# Patient Record
Sex: FEMALE | Race: WHITE | NOT HISPANIC OR LATINO | Employment: PART TIME | URBAN - METROPOLITAN AREA
[De-identification: names, ages, dates, MRNs, and addresses within clinical notes are randomized per-mention and may not be internally consistent; named-entity substitution may affect disease eponyms.]

---

## 2018-06-18 ENCOUNTER — HOSPITAL ENCOUNTER (EMERGENCY)
Facility: MEDICAL CENTER | Age: 53
End: 2018-06-24
Attending: EMERGENCY MEDICINE
Payer: MEDICAID

## 2018-06-18 DIAGNOSIS — R45.851 SUICIDAL IDEATION: ICD-10-CM

## 2018-06-18 LAB
AMPHETAMINES UR QL: NEGATIVE
BARBITURATES UR QL SCN: NEGATIVE
BENZODIAZ UR QL SCN: POSITIVE
BZE UR QL SCN: NEGATIVE
HCG UR QL: NEGATIVE
PCP UR QL SCN: NEGATIVE
POC BREATHALIZER: 0 PERCENT (ref 0–0.01)
SP GR UR REFRACTOMETRY: 1.02
UR OPIATES 2659: NEGATIVE
UR THC 2511T: NEGATIVE
UR TRICYCLIC 2660: NEGATIVE

## 2018-06-18 PROCEDURE — 81025 URINE PREGNANCY TEST: CPT

## 2018-06-18 PROCEDURE — 700102 HCHG RX REV CODE 250 W/ 637 OVERRIDE(OP): Performed by: EMERGENCY MEDICINE

## 2018-06-18 PROCEDURE — A9270 NON-COVERED ITEM OR SERVICE: HCPCS | Performed by: EMERGENCY MEDICINE

## 2018-06-18 PROCEDURE — 700102 HCHG RX REV CODE 250 W/ 637 OVERRIDE(OP)

## 2018-06-18 PROCEDURE — A9270 NON-COVERED ITEM OR SERVICE: HCPCS

## 2018-06-18 PROCEDURE — 302970 POC BREATHALIZER: Performed by: EMERGENCY MEDICINE

## 2018-06-18 PROCEDURE — 99285 EMERGENCY DEPT VISIT HI MDM: CPT

## 2018-06-18 PROCEDURE — 80305 DRUG TEST PRSMV DIR OPT OBS: CPT

## 2018-06-18 RX ORDER — BUSPIRONE HYDROCHLORIDE 5 MG/1
5 TABLET ORAL 3 TIMES DAILY PRN
Status: DISCONTINUED | OUTPATIENT
Start: 2018-06-18 | End: 2018-06-24 | Stop reason: HOSPADM

## 2018-06-18 RX ORDER — ATORVASTATIN CALCIUM 10 MG/1
10 TABLET, FILM COATED ORAL DAILY
Status: SHIPPED | COMMUNITY
End: 2018-11-30 | Stop reason: CLARIF

## 2018-06-18 RX ORDER — DULOXETIN HYDROCHLORIDE 30 MG/1
60 CAPSULE, DELAYED RELEASE ORAL DAILY
Status: SHIPPED | COMMUNITY
End: 2018-11-30 | Stop reason: CLARIF

## 2018-06-18 RX ORDER — MELOXICAM 7.5 MG/1
15 TABLET ORAL DAILY
Status: DISCONTINUED | OUTPATIENT
Start: 2018-06-18 | End: 2018-06-24 | Stop reason: HOSPADM

## 2018-06-18 RX ORDER — DULOXETIN HYDROCHLORIDE 30 MG/1
60 CAPSULE, DELAYED RELEASE ORAL DAILY
Status: DISCONTINUED | OUTPATIENT
Start: 2018-06-18 | End: 2018-06-24 | Stop reason: HOSPADM

## 2018-06-18 RX ORDER — LEVOTHYROXINE SODIUM 0.05 MG/1
25 TABLET ORAL
Status: DISCONTINUED | OUTPATIENT
Start: 2018-06-18 | End: 2018-06-24 | Stop reason: HOSPADM

## 2018-06-18 RX ORDER — ATORVASTATIN CALCIUM 10 MG/1
10 TABLET, FILM COATED ORAL
Status: DISCONTINUED | OUTPATIENT
Start: 2018-06-18 | End: 2018-06-24 | Stop reason: HOSPADM

## 2018-06-18 RX ORDER — QUETIAPINE FUMARATE 25 MG/1
TABLET, FILM COATED ORAL
Status: COMPLETED
Start: 2018-06-18 | End: 2018-06-18

## 2018-06-18 RX ORDER — GABAPENTIN 600 MG/1
600 TABLET ORAL 3 TIMES DAILY
Status: SHIPPED | COMMUNITY
End: 2018-11-30 | Stop reason: CLARIF

## 2018-06-18 RX ORDER — BUSPIRONE HYDROCHLORIDE 5 MG/1
5 TABLET ORAL 3 TIMES DAILY PRN
Status: SHIPPED | COMMUNITY
End: 2018-11-30 | Stop reason: CLARIF

## 2018-06-18 RX ORDER — GABAPENTIN 300 MG/1
600 CAPSULE ORAL 3 TIMES DAILY
Status: DISCONTINUED | OUTPATIENT
Start: 2018-06-18 | End: 2018-06-24 | Stop reason: HOSPADM

## 2018-06-18 RX ORDER — MELOXICAM 15 MG/1
15 TABLET ORAL DAILY
Status: SHIPPED | COMMUNITY
End: 2018-11-30 | Stop reason: CLARIF

## 2018-06-18 RX ORDER — LEVOTHYROXINE SODIUM 0.03 MG/1
25 TABLET ORAL
Status: SHIPPED | COMMUNITY
End: 2018-11-30 | Stop reason: CLARIF

## 2018-06-18 RX ORDER — LISINOPRIL 5 MG/1
10 TABLET ORAL DAILY
Status: DISCONTINUED | OUTPATIENT
Start: 2018-06-18 | End: 2018-06-24 | Stop reason: HOSPADM

## 2018-06-18 RX ORDER — QUETIAPINE FUMARATE 25 MG/1
150 TABLET, FILM COATED ORAL
Status: DISCONTINUED | OUTPATIENT
Start: 2018-06-18 | End: 2018-06-24 | Stop reason: HOSPADM

## 2018-06-18 RX ORDER — QUETIAPINE FUMARATE 100 MG/1
150 TABLET, FILM COATED ORAL
Status: SHIPPED | COMMUNITY
End: 2018-11-30 | Stop reason: CLARIF

## 2018-06-18 RX ORDER — QUETIAPINE FUMARATE 100 MG/1
TABLET, FILM COATED ORAL
Status: COMPLETED
Start: 2018-06-18 | End: 2018-06-18

## 2018-06-18 RX ADMIN — QUETIAPINE 150 MG: 25 TABLET ORAL at 20:47

## 2018-06-18 RX ADMIN — METFORMIN HYDROCHLORIDE 500 MG: 500 TABLET, FILM COATED ORAL at 18:46

## 2018-06-18 RX ADMIN — MELOXICAM 15 MG: 7.5 TABLET ORAL at 18:53

## 2018-06-18 RX ADMIN — GABAPENTIN 600 MG: 300 CAPSULE ORAL at 18:47

## 2018-06-18 RX ADMIN — DULOXETINE HYDROCHLORIDE 60 MG: 30 CAPSULE, DELAYED RELEASE ORAL at 18:46

## 2018-06-18 RX ADMIN — QUETIAPINE FUMARATE 150 MG: 25 TABLET ORAL at 20:47

## 2018-06-18 ASSESSMENT — PAIN SCALES - GENERAL
PAINLEVEL_OUTOF10: 7
PAINLEVEL_OUTOF10: 5

## 2018-06-18 NOTE — ED PROVIDER NOTES
ED Provider Note    CHIEF COMPLAINT  Chief Complaint   Patient presents with   • Suicidal Ideation     Plan to OD on seroquel.       HPI  Reba CASANOVA is a 53 y.o. female who presents to the ED secondary to suicidal ideation.  The patient states she has been depressed for years is been taking her medication, she has had off-and-on suicidal ideation but has been going on for the last several weeks.  The patient states that she is going to overdose on her Seroquel, she has not done anything to hurt herself.  The patient denies any chest pain, shortness breath, abdominal pains, nausea vomiting, headache.  The patient denies any drugs or alcohol, she does smoke tobacco.  She presents here with her friend    REVIEW OF SYSTEMS  See HPI for further details. All other systems are negative.     PAST MEDICAL HISTORY  Past Medical History:   Diagnosis Date   • Back pain    • Psychiatric disorder     depression       FAMILY HISTORY  History reviewed. No pertinent family history.    SOCIAL HISTORY  Social History     Social History   • Marital status:      Spouse name: N/A   • Number of children: N/A   • Years of education: N/A     Social History Main Topics   • Smoking status: Current Every Day Smoker     Packs/day: 0.50     Types: Cigarettes   • Smokeless tobacco: Never Used   • Alcohol use Yes      Comment: occ   • Drug use: No   • Sexual activity: Not on file     Other Topics Concern   • Not on file     Social History Narrative   • No narrative on file       SURGICAL HISTORY  Past Surgical History:   Procedure Laterality Date   • OTHER ABDOMINAL SURGERY      gallbladder removed       CURRENT MEDICATIONS  Home Medications     Reviewed by Melia Bartholomew (Pharmacy Tech) on 06/18/18 at 1524  Med List Status: Complete   Medication Last Dose Status   atorvastatin (LIPITOR) 10 MG Tab UNK Active   busPIRone (BUSPAR) 5 MG Tab UNK Active   DULoxetine (CYMBALTA) 30 MG Cap DR Particles UNK Active   gabapentin (NEURONTIN)  "600 MG tablet 6/18/2018 Active   levothyroxine (SYNTHROID) 25 MCG Tab UNK Active   lisinopril (PRINIVIL) 10 MG Tab 6/18/2018 Active   meloxicam (MOBIC) 15 MG tablet UNK Active   metFORMIN (GLUCOPHAGE) 500 MG Tab UNK Active   QUEtiapine (SEROQUEL) 100 MG Tab UNK Active                ALLERGIES  Allergies   Allergen Reactions   • Aspirin Rash and Swelling     Generalized rash, swelling in hands and feet       PHYSICAL EXAM  VITAL SIGNS: BP (!) 164/80   Pulse 95   Temp 36.6 °C (97.9 °F)   Resp 18   Ht 1.651 m (5' 5\")   Wt 96.7 kg (213 lb 3 oz)   SpO2 95%   BMI 35.48 kg/m²   Constitutional: Well developed, Well nourished, mild distress, Non-toxic appearance.   HENT: Normocephalic, Atraumatic, Bilateral external ears normal, Oropharynx moist, Nose normal.   Eyes: PERRLA, EOMI, Conjunctiva normal, No discharge.   Neck: Normal range of motion, No tenderness, Supple, No stridor.   Cardiovascular: Normal heart rate, Normal rhythm.   Thorax & Lungs: Normal breath sounds, No respiratory distress, No wheezing, No chest tenderness.  Abdomen: Bowel sounds normal, Soft, No tenderness, No masses, No pulsatile masses.    Skin: Warm, Dry, No erythema, No rash.   Extremities: Intact distal pulses, No edema, No tenderness.   Neurologic: Awake alert speech is clear  Psychiatric: Very depressed affect, poor eye contact soft speech    COURSE & MEDICAL DECISION MAKING  Pertinent Labs & Imaging studies reviewed. (See chart for details)  Patient with suicidal ideation, depression, poor eye contact, the patient is medically cleared, the alert team has interviewed the patient via telemedicine, we believe the patient should be placed on a legal hold, the paper was filled out by myself, the patient will be monitored here until she can be transferred to a psychiatric facility.  I have reordered her home medicines.    FINAL IMPRESSION  1. Suicidal ideation        Patient referred to primary care provider for blood pressure " management    This dictation was created using voice recognition software. The accuracy of the dictation is limited to the abilities of the software. I expect there may be some errors of grammar and possibly content. The nursing notes were reviewed and certain aspects of this information were incorporated into this note.    Electronically signed by: Gil Naranjo, 6/18/2018 3:53 PM
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
## 2018-06-18 NOTE — ED NOTES
Med Rec complete per Pt at bedside and RX bottles (returned)  Allergies Reviewed  No ABX in the last 30 days    Pt doesn't remember when she took medications other than LISINOPRIL and GABAPENTIN

## 2018-06-19 ENCOUNTER — HOSPITAL ENCOUNTER (OUTPATIENT)
Facility: MEDICAL CENTER | Age: 53
End: 2018-06-19
Attending: PSYCHIATRY & NEUROLOGY
Payer: MEDICAID

## 2018-06-19 ENCOUNTER — APPOINTMENT (OUTPATIENT)
Dept: SCHEDULING | Facility: IMAGING CENTER | Age: 53
End: 2018-06-19
Attending: EMERGENCY MEDICINE
Payer: MEDICAID

## 2018-06-19 PROCEDURE — A9270 NON-COVERED ITEM OR SERVICE: HCPCS

## 2018-06-19 PROCEDURE — 700102 HCHG RX REV CODE 250 W/ 637 OVERRIDE(OP): Performed by: EMERGENCY MEDICINE

## 2018-06-19 PROCEDURE — A9270 NON-COVERED ITEM OR SERVICE: HCPCS | Performed by: EMERGENCY MEDICINE

## 2018-06-19 PROCEDURE — 700102 HCHG RX REV CODE 250 W/ 637 OVERRIDE(OP)

## 2018-06-19 RX ORDER — GABAPENTIN 300 MG/1
CAPSULE ORAL
Status: COMPLETED
Start: 2018-06-19 | End: 2018-06-19

## 2018-06-19 RX ORDER — IBUPROFEN 600 MG/1
600 TABLET ORAL ONCE
Status: COMPLETED | OUTPATIENT
Start: 2018-06-19 | End: 2018-06-19

## 2018-06-19 RX ORDER — QUETIAPINE FUMARATE 100 MG/1
TABLET, FILM COATED ORAL
Status: COMPLETED
Start: 2018-06-19 | End: 2018-06-19

## 2018-06-19 RX ORDER — QUETIAPINE FUMARATE 25 MG/1
TABLET, FILM COATED ORAL
Status: DISPENSED
Start: 2018-06-19 | End: 2018-06-19

## 2018-06-19 RX ORDER — NICOTINE 21 MG/24HR
21 PATCH, TRANSDERMAL 24 HOURS TRANSDERMAL
Status: CANCELLED | OUTPATIENT
Start: 2018-06-19

## 2018-06-19 RX ORDER — ACETAMINOPHEN 325 MG/1
650 TABLET ORAL ONCE
Status: COMPLETED | OUTPATIENT
Start: 2018-06-19 | End: 2018-06-19

## 2018-06-19 RX ORDER — QUETIAPINE FUMARATE 25 MG/1
TABLET, FILM COATED ORAL
Status: COMPLETED
Start: 2018-06-19 | End: 2018-06-19

## 2018-06-19 RX ORDER — QUETIAPINE FUMARATE 100 MG/1
TABLET, FILM COATED ORAL
Status: DISPENSED
Start: 2018-06-19 | End: 2018-06-19

## 2018-06-19 RX ORDER — NICOTINE 21 MG/24HR
PATCH, TRANSDERMAL 24 HOURS TRANSDERMAL
Status: COMPLETED
Start: 2018-06-19 | End: 2018-06-19

## 2018-06-19 RX ORDER — LISINOPRIL 5 MG/1
TABLET ORAL
Status: COMPLETED
Start: 2018-06-19 | End: 2018-06-19

## 2018-06-19 RX ORDER — NICOTINE 21 MG/24HR
1 PATCH, TRANSDERMAL 24 HOURS TRANSDERMAL ONCE
Status: COMPLETED | OUTPATIENT
Start: 2018-06-19 | End: 2018-06-20

## 2018-06-19 RX ORDER — ATORVASTATIN CALCIUM 10 MG/1
10 TABLET, FILM COATED ORAL
Status: CANCELLED | OUTPATIENT
Start: 2018-06-19

## 2018-06-19 RX ADMIN — MELOXICAM 15 MG: 7.5 TABLET ORAL at 09:52

## 2018-06-19 RX ADMIN — GABAPENTIN 600 MG: 300 CAPSULE ORAL at 19:17

## 2018-06-19 RX ADMIN — GABAPENTIN 600 MG: 300 CAPSULE ORAL at 07:51

## 2018-06-19 RX ADMIN — METFORMIN HYDROCHLORIDE 500 MG: 500 TABLET, FILM COATED ORAL at 19:17

## 2018-06-19 RX ADMIN — LISINOPRIL 10 MG: 5 TABLET ORAL at 07:51

## 2018-06-19 RX ADMIN — DULOXETINE HYDROCHLORIDE 60 MG: 30 CAPSULE, DELAYED RELEASE ORAL at 09:52

## 2018-06-19 RX ADMIN — IBUPROFEN 600 MG: 600 TABLET ORAL at 20:41

## 2018-06-19 RX ADMIN — ACETAMINOPHEN 650 MG: 325 TABLET, FILM COATED ORAL at 18:28

## 2018-06-19 RX ADMIN — QUETIAPINE 150 MG: 25 TABLET ORAL at 20:01

## 2018-06-19 RX ADMIN — ATORVASTATIN CALCIUM 10 MG: 10 TABLET, FILM COATED ORAL at 20:00

## 2018-06-19 RX ADMIN — NICOTINE 1 PATCH: 21 PATCH, EXTENDED RELEASE TRANSDERMAL at 13:29

## 2018-06-19 RX ADMIN — METFORMIN HYDROCHLORIDE 500 MG: 500 TABLET, FILM COATED ORAL at 07:51

## 2018-06-19 RX ADMIN — LEVOTHYROXINE SODIUM 25 MCG: 50 TABLET ORAL at 07:50

## 2018-06-19 NOTE — DISCHARGE PLANNING
"Alert Team  Called [facility] to have pt assessed for possible f/u services.  [facility] finds her Medicaid HMO to not be currently active; listed as \"pending.\"  If her insurance activates while inpt, [facility] can be contacted again for assessment.  "

## 2018-06-19 NOTE — ED NOTES
Patient in bed sleeping with no signs of distress or discomfort. Chest rising evenly and unlabored. Bed in lowest position and locked. All potentially harmful objects removed from room. Pt in direct view of RN. Close obs continued.

## 2018-06-19 NOTE — ED PROVIDER NOTES
ED Provider Note    Patient is awaiting transfer for psychiatric care.  She had thoughts of overdosing on Seroquel.  States she is feeling better today.  She denies any medical complaints at this point.  Her vital signs are stable.  Unremarkable reassessment.  We will proceed with plan.

## 2018-06-19 NOTE — ED NOTES
Patient presented for telemedicine consultation via secure and encrypted videoconferencing equipment.  Life Skills consult in progress. Consent signed and scanned into chart.

## 2018-06-19 NOTE — ED NOTES
Patient still asleep.  Changed sleeping position a couple times.  Continues to be on close observation.
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
## 2018-06-19 NOTE — ED NOTES
Report given to Beth VILLANUEVA to assume cares.  Informed about medication not given due to pt sleeping per charge nurse.  No questions at this time.

## 2018-06-19 NOTE — ED NOTES
"Medicated. States \"I just want to sleep\". Educated to call for staff when need to go to the bathroom or needs any assistance. Verbalized understanding.  "
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
## 2018-06-19 NOTE — ED NOTES
Pt asleep, pt woken up to take am medications. Pt took her medications and proceeded to go back to sleep.

## 2018-06-20 PROCEDURE — 700102 HCHG RX REV CODE 250 W/ 637 OVERRIDE(OP): Performed by: EMERGENCY MEDICINE

## 2018-06-20 PROCEDURE — A9270 NON-COVERED ITEM OR SERVICE: HCPCS | Performed by: EMERGENCY MEDICINE

## 2018-06-20 PROCEDURE — 700102 HCHG RX REV CODE 250 W/ 637 OVERRIDE(OP)

## 2018-06-20 PROCEDURE — A9270 NON-COVERED ITEM OR SERVICE: HCPCS

## 2018-06-20 RX ORDER — NICOTINE 21 MG/24HR
1 PATCH, TRANSDERMAL 24 HOURS TRANSDERMAL ONCE
Status: COMPLETED | OUTPATIENT
Start: 2018-06-20 | End: 2018-06-19

## 2018-06-20 RX ORDER — GABAPENTIN 300 MG/1
CAPSULE ORAL
Status: COMPLETED
Start: 2018-06-20 | End: 2018-06-20

## 2018-06-20 RX ORDER — NICOTINE 21 MG/24HR
1 PATCH, TRANSDERMAL 24 HOURS TRANSDERMAL ONCE
Status: COMPLETED | OUTPATIENT
Start: 2018-06-20 | End: 2018-06-20

## 2018-06-20 RX ORDER — HYDROCODONE BITARTRATE AND ACETAMINOPHEN 5; 325 MG/1; MG/1
1-2 TABLET ORAL EVERY 6 HOURS PRN
Status: DISCONTINUED | OUTPATIENT
Start: 2018-06-20 | End: 2018-06-24 | Stop reason: HOSPADM

## 2018-06-20 RX ORDER — QUETIAPINE FUMARATE 25 MG/1
TABLET, FILM COATED ORAL
Status: COMPLETED
Start: 2018-06-20 | End: 2018-06-20

## 2018-06-20 RX ORDER — QUETIAPINE FUMARATE 100 MG/1
TABLET, FILM COATED ORAL
Status: COMPLETED
Start: 2018-06-20 | End: 2018-06-20

## 2018-06-20 RX ORDER — HYDROCODONE BITARTRATE AND ACETAMINOPHEN 5; 325 MG/1; MG/1
1 TABLET ORAL ONCE
Status: COMPLETED | OUTPATIENT
Start: 2018-06-20 | End: 2018-06-20

## 2018-06-20 RX ADMIN — HYDROCODONE BITARTRATE AND ACETAMINOPHEN 1 TABLET: 5; 325 TABLET ORAL at 02:37

## 2018-06-20 RX ADMIN — GABAPENTIN 600 MG: 300 CAPSULE ORAL at 21:16

## 2018-06-20 RX ADMIN — LEVOTHYROXINE SODIUM 25 MCG: 50 TABLET ORAL at 07:47

## 2018-06-20 RX ADMIN — ATORVASTATIN CALCIUM 10 MG: 10 TABLET, FILM COATED ORAL at 21:16

## 2018-06-20 RX ADMIN — MELOXICAM 15 MG: 7.5 TABLET ORAL at 08:11

## 2018-06-20 RX ADMIN — HYDROCODONE BITARTRATE AND ACETAMINOPHEN 1 TABLET: 5; 325 TABLET ORAL at 21:15

## 2018-06-20 RX ADMIN — METFORMIN HYDROCHLORIDE 500 MG: 500 TABLET, FILM COATED ORAL at 07:45

## 2018-06-20 RX ADMIN — HYDROCODONE BITARTRATE AND ACETAMINOPHEN 1 TABLET: 5; 325 TABLET ORAL at 12:59

## 2018-06-20 RX ADMIN — HYDROCODONE BITARTRATE AND ACETAMINOPHEN 1 TABLET: 5; 325 TABLET ORAL at 07:45

## 2018-06-20 RX ADMIN — NICOTINE 1 PATCH: 21 PATCH, EXTENDED RELEASE TRANSDERMAL at 07:44

## 2018-06-20 RX ADMIN — LISINOPRIL 10 MG: 5 TABLET ORAL at 08:11

## 2018-06-20 RX ADMIN — DULOXETINE HYDROCHLORIDE 60 MG: 30 CAPSULE, DELAYED RELEASE ORAL at 08:11

## 2018-06-20 RX ADMIN — QUETIAPINE 150 MG: 25 TABLET ORAL at 21:26

## 2018-06-20 ASSESSMENT — PAIN SCALES - GENERAL
PAINLEVEL_OUTOF10: 8
PAINLEVEL_OUTOF10: 7

## 2018-06-20 NOTE — ED NOTES
Patient still reporting pain issues.  Ice pack provided with strings cut.  ERP notified and motrin ordered and provided for pain relief.

## 2018-06-20 NOTE — ED PROVIDER NOTES
ED Provider Note    Patient turned over to me pending transfer to psychiatric facility.  Patient states the only complaint is some lumbar back pain.  She has chronic lower back pain.  Patient is given Norco for this as she is allergic to anti-inflammatories.  Patient is turned over at 2:00 PM pending transfer to psychiatric facility.    1. Suicidal ideation

## 2018-06-20 NOTE — ED NOTES
Patient asking for pain medications for back pain.  PSA informed this nurse.  ERP notified.  Medication ordered.  Pt is back asleep.

## 2018-06-20 NOTE — ED NOTES
Bedside report received from Beth VILLANUEVA.  Went into room with during med pass and was introduced.  Patient reports still feeling down and not happy about her chronic pain issues.

## 2018-06-20 NOTE — ED NOTES
Assumed care of pt to cover lunch break for primary nurse. Plan of care reviewed with pt. Pt resting and denies any needs, 1:1 sitter in front of the room.

## 2018-06-20 NOTE — ED NOTES
Pt resting in [facility] with sitter outside the room in direct line of sight.

## 2018-06-20 NOTE — DISCHARGE PLANNING
MALIK called [facility] and was able to confirm with Amanda that they have a packet on this patient.  It had not been reviewed yet.

## 2018-06-20 NOTE — ED NOTES
Pt ambulated steadily with walker to restroom and back to [facility] with RN at side. New ice pack provided. Requesting pain medication for back pain.

## 2018-06-20 NOTE — ED NOTES
Patient awake.  Requested OJ.  Asked about breakfast.  Patient wishes to be woken up when meal gets here so it wont be cold.

## 2018-06-21 PROCEDURE — 700102 HCHG RX REV CODE 250 W/ 637 OVERRIDE(OP): Performed by: EMERGENCY MEDICINE

## 2018-06-21 PROCEDURE — A9270 NON-COVERED ITEM OR SERVICE: HCPCS

## 2018-06-21 PROCEDURE — A9270 NON-COVERED ITEM OR SERVICE: HCPCS | Performed by: EMERGENCY MEDICINE

## 2018-06-21 PROCEDURE — 700102 HCHG RX REV CODE 250 W/ 637 OVERRIDE(OP)

## 2018-06-21 RX ORDER — GABAPENTIN 300 MG/1
CAPSULE ORAL
Status: COMPLETED
Start: 2018-06-21 | End: 2018-06-21

## 2018-06-21 RX ORDER — QUETIAPINE FUMARATE 100 MG/1
TABLET, FILM COATED ORAL
Status: COMPLETED
Start: 2018-06-21 | End: 2018-06-21

## 2018-06-21 RX ORDER — QUETIAPINE FUMARATE 25 MG/1
TABLET, FILM COATED ORAL
Status: COMPLETED
Start: 2018-06-21 | End: 2018-06-21

## 2018-06-21 RX ORDER — NICOTINE 21 MG/24HR
1 PATCH, TRANSDERMAL 24 HOURS TRANSDERMAL ONCE
Status: COMPLETED | OUTPATIENT
Start: 2018-06-21 | End: 2018-06-22

## 2018-06-21 RX ADMIN — GABAPENTIN 600 MG: 300 CAPSULE ORAL at 09:07

## 2018-06-21 RX ADMIN — HYDROCODONE BITARTRATE AND ACETAMINOPHEN 1 TABLET: 5; 325 TABLET ORAL at 20:00

## 2018-06-21 RX ADMIN — METFORMIN HYDROCHLORIDE 500 MG: 500 TABLET, FILM COATED ORAL at 09:06

## 2018-06-21 RX ADMIN — GABAPENTIN 600 MG: 300 CAPSULE ORAL at 21:40

## 2018-06-21 RX ADMIN — LEVOTHYROXINE SODIUM 25 MCG: 50 TABLET ORAL at 09:06

## 2018-06-21 RX ADMIN — DULOXETINE HYDROCHLORIDE 60 MG: 30 CAPSULE, DELAYED RELEASE ORAL at 09:05

## 2018-06-21 RX ADMIN — METFORMIN HYDROCHLORIDE 500 MG: 500 TABLET, FILM COATED ORAL at 17:34

## 2018-06-21 RX ADMIN — MAGESIUM CITRATE 296 ML: 1.75 LIQUID ORAL at 06:15

## 2018-06-21 RX ADMIN — LISINOPRIL 10 MG: 5 TABLET ORAL at 09:06

## 2018-06-21 RX ADMIN — NICOTINE 1 PATCH: 21 PATCH, EXTENDED RELEASE TRANSDERMAL at 09:03

## 2018-06-21 RX ADMIN — MELOXICAM 15 MG: 7.5 TABLET ORAL at 09:05

## 2018-06-21 RX ADMIN — ATORVASTATIN CALCIUM 10 MG: 10 TABLET, FILM COATED ORAL at 21:40

## 2018-06-21 RX ADMIN — HYDROCODONE BITARTRATE AND ACETAMINOPHEN 2 TABLET: 5; 325 TABLET ORAL at 06:04

## 2018-06-21 RX ADMIN — HYDROCODONE BITARTRATE AND ACETAMINOPHEN 1 TABLET: 5; 325 TABLET ORAL at 12:18

## 2018-06-21 ASSESSMENT — PAIN SCALES - GENERAL
PAINLEVEL_OUTOF10: 3
PAINLEVEL_OUTOF10: 3
PAINLEVEL_OUTOF10: 6
PAINLEVEL_OUTOF10: 3
PAINLEVEL_OUTOF10: 3

## 2018-06-21 NOTE — ED NOTES
Pt concerned she has not had a BS since coming in  MD aware  Order rec'ed  Started on PO med for assist in bowel care

## 2018-06-21 NOTE — ED NOTES
Pt awake and ambulating in [place]  c/o increased back pain mid area  Given PO pain med as ordered

## 2018-06-21 NOTE — ED NOTES
Pt medicated as directed , new Nicoderm patch applied. Pt remains calm and co operative  poc explained to pt

## 2018-06-21 NOTE — ED NOTES
Sagrario  present with pt, update given . All questions answered at this time  Direct observation sitter remains present outside room

## 2018-06-21 NOTE — ED NOTES
Pt was given PM med's  Taken well  Sleeping w/out distress  Remains in direct observation by GEOFFREY Pompa

## 2018-06-21 NOTE — ED NOTES
Assumed care of pt to cover lunch break for primary nurse. Pt resting in bed comfortably, 1:1 sitter in front of the room.

## 2018-06-22 PROCEDURE — 700102 HCHG RX REV CODE 250 W/ 637 OVERRIDE(OP)

## 2018-06-22 PROCEDURE — 700102 HCHG RX REV CODE 250 W/ 637 OVERRIDE(OP): Performed by: EMERGENCY MEDICINE

## 2018-06-22 PROCEDURE — A9270 NON-COVERED ITEM OR SERVICE: HCPCS | Performed by: EMERGENCY MEDICINE

## 2018-06-22 PROCEDURE — A9270 NON-COVERED ITEM OR SERVICE: HCPCS

## 2018-06-22 RX ORDER — GABAPENTIN 300 MG/1
CAPSULE ORAL
Status: COMPLETED
Start: 2018-06-22 | End: 2018-06-22

## 2018-06-22 RX ORDER — NICOTINE 21 MG/24HR
1 PATCH, TRANSDERMAL 24 HOURS TRANSDERMAL ONCE
Status: COMPLETED | OUTPATIENT
Start: 2018-06-22 | End: 2018-06-23

## 2018-06-22 RX ADMIN — ATORVASTATIN CALCIUM 10 MG: 10 TABLET, FILM COATED ORAL at 21:12

## 2018-06-22 RX ADMIN — NICOTINE 1 PATCH: 21 PATCH, EXTENDED RELEASE TRANSDERMAL at 12:34

## 2018-06-22 RX ADMIN — LEVOTHYROXINE SODIUM 25 MCG: 50 TABLET ORAL at 08:12

## 2018-06-22 RX ADMIN — DULOXETINE HYDROCHLORIDE 60 MG: 30 CAPSULE, DELAYED RELEASE ORAL at 08:11

## 2018-06-22 RX ADMIN — GABAPENTIN 600 MG: 300 CAPSULE ORAL at 16:11

## 2018-06-22 RX ADMIN — METFORMIN HYDROCHLORIDE 500 MG: 500 TABLET, FILM COATED ORAL at 08:12

## 2018-06-22 RX ADMIN — QUETIAPINE 150 MG: 25 TABLET ORAL at 21:11

## 2018-06-22 RX ADMIN — GABAPENTIN 600 MG: 300 CAPSULE ORAL at 21:13

## 2018-06-22 RX ADMIN — GABAPENTIN 600 MG: 300 CAPSULE ORAL at 05:41

## 2018-06-22 RX ADMIN — METFORMIN HYDROCHLORIDE 500 MG: 500 TABLET, FILM COATED ORAL at 17:20

## 2018-06-22 RX ADMIN — LISINOPRIL 10 MG: 5 TABLET ORAL at 08:13

## 2018-06-22 RX ADMIN — MELOXICAM 15 MG: 7.5 TABLET ORAL at 08:11

## 2018-06-22 RX ADMIN — HYDROCODONE BITARTRATE AND ACETAMINOPHEN 1 TABLET: 5; 325 TABLET ORAL at 12:34

## 2018-06-22 RX ADMIN — HYDROCODONE BITARTRATE AND ACETAMINOPHEN 2 TABLET: 5; 325 TABLET ORAL at 20:19

## 2018-06-22 RX ADMIN — HYDROCODONE BITARTRATE AND ACETAMINOPHEN 1 TABLET: 5; 325 TABLET ORAL at 05:41

## 2018-06-22 ASSESSMENT — PAIN SCALES - GENERAL
PAINLEVEL_OUTOF10: 4
PAINLEVEL_OUTOF10: 7
PAINLEVEL_OUTOF10: 6

## 2018-06-22 ASSESSMENT — PAIN SCALES - WONG BAKER: WONGBAKER_NUMERICALRESPONSE: HURTS JUST A LITTLE BIT

## 2018-06-22 NOTE — ED NOTES
Taken 2 pudding snacks.  No c/o at this time.  Denies needed.  One on one sitter at the pt bedside.

## 2018-06-22 NOTE — ED NOTES
Report received from Ana Rn   , assumed care at this time  Pt sleeping , calm  Direct observation remains in place

## 2018-06-22 NOTE — ED NOTES
Medicated for c/o back pain as well as ice applied to the pt back.  Pt is in bed. No distress.  Call light in reach.  Sitter at bedside.

## 2018-06-22 NOTE — ED NOTES
Laying back down in bed.  No distress.  resp even and non-labored.  Call light in reach. One:one, continues.

## 2018-06-22 NOTE — ED NOTES
In bed.  Sleeping.  Resp even and non-labored.  No distress.  Call light in reach.  Sitter at bedside.

## 2018-06-23 VITALS
RESPIRATION RATE: 18 BRPM | DIASTOLIC BLOOD PRESSURE: 55 MMHG | HEART RATE: 64 BPM | OXYGEN SATURATION: 96 % | TEMPERATURE: 97.5 F | BODY MASS INDEX: 35.52 KG/M2 | HEIGHT: 65 IN | SYSTOLIC BLOOD PRESSURE: 115 MMHG | WEIGHT: 213.19 LBS

## 2018-06-23 PROCEDURE — 700102 HCHG RX REV CODE 250 W/ 637 OVERRIDE(OP): Performed by: EMERGENCY MEDICINE

## 2018-06-23 PROCEDURE — A9270 NON-COVERED ITEM OR SERVICE: HCPCS | Performed by: EMERGENCY MEDICINE

## 2018-06-23 RX ORDER — NICOTINE 21 MG/24HR
1 PATCH, TRANSDERMAL 24 HOURS TRANSDERMAL ONCE
Status: COMPLETED | OUTPATIENT
Start: 2018-06-23 | End: 2018-06-23

## 2018-06-23 RX ADMIN — GABAPENTIN 600 MG: 300 CAPSULE ORAL at 07:17

## 2018-06-23 RX ADMIN — NICOTINE 1 PATCH: 21 PATCH, EXTENDED RELEASE TRANSDERMAL at 07:16

## 2018-06-23 RX ADMIN — METFORMIN HYDROCHLORIDE 500 MG: 500 TABLET, FILM COATED ORAL at 18:54

## 2018-06-23 RX ADMIN — METFORMIN HYDROCHLORIDE 500 MG: 500 TABLET, FILM COATED ORAL at 09:30

## 2018-06-23 RX ADMIN — GABAPENTIN 600 MG: 300 CAPSULE ORAL at 21:35

## 2018-06-23 RX ADMIN — HYDROCODONE BITARTRATE AND ACETAMINOPHEN 2 TABLET: 5; 325 TABLET ORAL at 04:58

## 2018-06-23 RX ADMIN — DULOXETINE HYDROCHLORIDE 60 MG: 30 CAPSULE, DELAYED RELEASE ORAL at 09:29

## 2018-06-23 RX ADMIN — QUETIAPINE 150 MG: 25 TABLET ORAL at 21:35

## 2018-06-23 RX ADMIN — HYDROCODONE BITARTRATE AND ACETAMINOPHEN 2 TABLET: 5; 325 TABLET ORAL at 14:34

## 2018-06-23 RX ADMIN — ATORVASTATIN CALCIUM 10 MG: 10 TABLET, FILM COATED ORAL at 21:35

## 2018-06-23 RX ADMIN — MELOXICAM 15 MG: 7.5 TABLET ORAL at 09:30

## 2018-06-23 RX ADMIN — GABAPENTIN 600 MG: 300 CAPSULE ORAL at 14:00

## 2018-06-23 RX ADMIN — LISINOPRIL 10 MG: 5 TABLET ORAL at 09:30

## 2018-06-23 RX ADMIN — LEVOTHYROXINE SODIUM 25 MCG: 50 TABLET ORAL at 07:16

## 2018-06-23 ASSESSMENT — PAIN SCALES - GENERAL: PAINLEVEL_OUTOF10: 7

## 2018-06-23 NOTE — ED NOTES
Pt back to floor.  Assumed patient care. Pt assesement done.  Plan of care reviewed with patient.

## 2018-06-23 NOTE — ED NOTES
Patient alert oriented but very tearful.  She wants to go home She also wants to talk to .   called and will come down and talk with patient. Patient states the depression meds she is on has not been working for a long time and would like to try some other meds.

## 2018-06-23 NOTE — ED NOTES
Jaycob from [facility] called. Asked for medical referral. Referral resent to [place] 6/22/18 at 2028

## 2018-06-24 ENCOUNTER — APPOINTMENT (OUTPATIENT)
Dept: RADIOLOGY | Facility: MEDICAL CENTER | Age: 53
End: 2018-06-24
Attending: EMERGENCY MEDICINE
Payer: MEDICAID

## 2018-06-24 PROCEDURE — 73502 X-RAY EXAM HIP UNI 2-3 VIEWS: CPT | Mod: LT

## 2018-06-24 PROCEDURE — A9270 NON-COVERED ITEM OR SERVICE: HCPCS | Performed by: EMERGENCY MEDICINE

## 2018-06-24 PROCEDURE — 700102 HCHG RX REV CODE 250 W/ 637 OVERRIDE(OP): Performed by: EMERGENCY MEDICINE

## 2018-06-24 RX ORDER — MELOXICAM 7.5 MG/1
TABLET ORAL
Status: DISCONTINUED
Start: 2018-06-24 | End: 2018-06-24 | Stop reason: HOSPADM

## 2018-06-24 RX ADMIN — GABAPENTIN 600 MG: 300 CAPSULE ORAL at 06:06

## 2018-06-24 RX ADMIN — LISINOPRIL 10 MG: 5 TABLET ORAL at 10:01

## 2018-06-24 RX ADMIN — MELOXICAM 15 MG: 7.5 TABLET ORAL at 10:02

## 2018-06-24 RX ADMIN — LEVOTHYROXINE SODIUM 25 MCG: 50 TABLET ORAL at 07:48

## 2018-06-24 RX ADMIN — METFORMIN HYDROCHLORIDE 500 MG: 500 TABLET, FILM COATED ORAL at 07:49

## 2018-06-24 RX ADMIN — HYDROCODONE BITARTRATE AND ACETAMINOPHEN 1 TABLET: 5; 325 TABLET ORAL at 06:06

## 2018-06-24 RX ADMIN — DULOXETINE HYDROCHLORIDE 60 MG: 30 CAPSULE, DELAYED RELEASE ORAL at 10:01

## 2018-06-24 NOTE — DISCHARGE PLANNING
SW met with patient and reviewed legal hold process with her.  SW present while patient called her sister.  Patient grateful that she was able to talk with sister and stated that she feels better.

## 2018-06-24 NOTE — PROGRESS NOTES
Patient asked for more coffee. I told her that we should not give her more so that she can have a good night of sleep tonight. She then requested a new nicoderm patch but she isnt due until 7am tomorrow morning. When I explained that, she became argumentative. I told her I would bring her night meds in an hour.

## 2018-06-24 NOTE — DISCHARGE PLANNING
SW received phone call from [facility] patient has been accepted to transfer today.  Accepting MD is Mel.  ER staff notified.  MALIK completed [facility] PCS form and faxed to [facility].

## 2018-06-24 NOTE — DISCHARGE PLANNING
MALIK received called from [facility] requesting a copy of patient's legal hold extension.  MALIK informed that they may be able to accept this patient latter today.  MALIK faxed copy of paperwork to [facility].

## 2018-06-24 NOTE — PROGRESS NOTES
"Patient requested \"glue for arts and crafts\". I was unable to find any. Patient was given coffee.   "

## 2018-06-24 NOTE — ED PROVIDER NOTES
"ED Provider Note    7:33 AM patient reevaluated at bedside.  Patient is on a legal hold for suicidal ideation, waiting transfer to psychiatric facility when a bed is available.  No concerns overnight, however patient quite frustrated with her prolonged stay in this department.  She is requesting , I will have case management speak with patient regarding this process, patient is aware that case management may not be available until tomorrow.  Patient was evaluated by Dr. Anderson 6/19/18 who notes hold extended.  Received verified petition from the court to extend legal hold on 6/21/18 as well.  Additionally, patient is quite concerned about left posterior hip and buttock pain after a fall a couple of weeks ago.  Pain persists and is worse with palpation and weightbearing.  Patient states she has not had workup for this discomfort \"I know there is not a bruise on the outside but there is something wrong on the inside I would feel better with an x-ray.\".  Pain with palpation over the left posterior lateral buttock, no crepitus, swelling, ecchymosis or deformity.  No shortening or rotation.  Patient bears weight with mild discomfort, otherwise without difficulty.  X-ray has been ordered.  Breakfast tray with coffee has arrived.  Medication reconciliation has been completed as indicated and patient is receiving routine medications.    8:31 AM   DX-HIP-COMPLETE - UNILATERAL 2+ LEFT   Final Result      1.  No LEFT hip or pelvic fracture.   2.  Moderate degenerative change of both hips.      X-rays demonstrating degenerative disease only.  Patient is aware the findings.  We will continue medications including Neurontin, meloxicam and as needed Norco for breakthrough pain.      FINAL IMPRESSION  (R45.851) Suicidal ideation      Electronically signed by: Ilda Barth, 6/24/2018 7:38 AM    This dictation was created using voice recognition software. The accuracy of the dictation is limited to the abilities of " the software. I expect there may be some errors of grammar and possibly content. The nursing notes were reviewed and certain aspects of this information were incorporated into this note.

## 2018-06-24 NOTE — ED NOTES
Pt medicated as ordered for pain.  Pt very upset.  States that she does not like that she has to be escorted by security to take a shower.  Offered to make sure that the officer is a female.  Pt states that it is embarrassing  to be escorted by security.  Discussed policy with patient.  She does not appear to have listened.

## 2018-06-24 NOTE — PROGRESS NOTES
Patient resting comfortably in bed. Patient has lights out and appears asleep patient remains under direct observation.

## 2018-06-24 NOTE — PROGRESS NOTES
PAtient remains in bed with lights out and eyes closed. Patient appears asleep. PAtient remains under direct observation

## 2018-06-24 NOTE — DISCHARGE PLANNING
Spoke To: Long   Agency/Facility Name: [facility]  Plan or Request: Patient to transfer to [facility]  GREGORY Saucedo has been advised of transport time.

## 2018-07-19 ENCOUNTER — HOSPITAL ENCOUNTER (EMERGENCY)
Facility: MEDICAL CENTER | Age: 53
End: 2018-07-19
Attending: EMERGENCY MEDICINE
Payer: MEDICAID

## 2018-07-19 VITALS
HEIGHT: 65 IN | HEART RATE: 80 BPM | DIASTOLIC BLOOD PRESSURE: 79 MMHG | OXYGEN SATURATION: 96 % | RESPIRATION RATE: 18 BRPM | WEIGHT: 210.76 LBS | BODY MASS INDEX: 35.11 KG/M2 | TEMPERATURE: 97.3 F | SYSTOLIC BLOOD PRESSURE: 142 MMHG

## 2018-07-19 DIAGNOSIS — M54.50 LUMBAR PAIN: ICD-10-CM

## 2018-07-19 DIAGNOSIS — F32.A DEPRESSION, UNSPECIFIED DEPRESSION TYPE: ICD-10-CM

## 2018-07-19 PROCEDURE — 99283 EMERGENCY DEPT VISIT LOW MDM: CPT

## 2018-07-19 RX ORDER — DULOXETIN HYDROCHLORIDE 30 MG/1
30 CAPSULE, DELAYED RELEASE ORAL DAILY
Qty: 30 CAP | Refills: 0 | Status: SHIPPED | OUTPATIENT
Start: 2018-07-19 | End: 2018-11-30 | Stop reason: CLARIF

## 2018-07-19 RX ORDER — KETOROLAC TROMETHAMINE 30 MG/ML
30 INJECTION, SOLUTION INTRAMUSCULAR; INTRAVENOUS ONCE
Status: DISCONTINUED | OUTPATIENT
Start: 2018-07-19 | End: 2018-07-19 | Stop reason: HOSPADM

## 2018-07-19 RX ORDER — CYCLOBENZAPRINE HCL 5 MG
5-10 TABLET ORAL 3 TIMES DAILY PRN
Qty: 10 TAB | Refills: 0 | Status: SHIPPED | OUTPATIENT
Start: 2018-07-19 | End: 2018-11-30 | Stop reason: CLARIF

## 2018-07-19 ASSESSMENT — PAIN SCALES - GENERAL
PAINLEVEL_OUTOF10: 10
PAINLEVEL_OUTOF10: 10

## 2018-07-19 NOTE — ED TRIAGE NOTES
54 y/o female ambulatory to triage for evaluation of left lower back pain which radiates down her left leg. Pt also states she is out of her Cymbalta and is requesting a refill until she can establish primary care.

## 2018-07-19 NOTE — ED PROVIDER NOTES
ED Provider Note    Scribed for Felicia Sharp M.D. by Viktoriya Rutledge. 7/19/2018, 4:50 PM.    Primary care provider: Pcp Pt States None  Means of arrival: Private vehicle   History obtained from: Patient   History limited by: None     CHIEF COMPLAINT  Chief Complaint   Patient presents with   • Low Back Pain     radiates down her left leg   • Medication Refill     Cymbalta       HPI  Reba CASANOVA is a 53 y.o. female with a history of back pain and depression who presents to the Emergency Department for evaluation of low back pain onset several months ago. Patient reports her pain radiates down her left leg. Patient was recently diagnosed with fibromyalgia and states she does not have a primary care physician. Additionally, patient reports that she is out of her Cymbalta prescription and is requesting a refill as she does not currently have a primary care physician. Patient denies a history of IV drug use and cancer. No complaints of SI.     REVIEW OF SYSTEMS  Pertinent positives include low back pain, left lower extremity pain. Pertinent negatives include no SI. E.      PAST MEDICAL HISTORY   has a past medical history of Back pain and Psychiatric disorder.    SURGICAL HISTORY   has a past surgical history that includes other abdominal surgery.    SOCIAL HISTORY  Social History   Substance Use Topics   • Smoking status: Current Every Day Smoker     Packs/day: 0.50     Types: Cigarettes   • Smokeless tobacco: Never Used   • Alcohol use Yes      Comment: occ      History   Drug Use No       FAMILY HISTORY  No family history noted    CURRENT MEDICATIONS  No current facility-administered medications on file prior to encounter.      Current Outpatient Prescriptions on File Prior to Encounter   Medication Sig Dispense Refill   • levothyroxine (SYNTHROID) 25 MCG Tab Take 25 mcg by mouth Every morning on an empty stomach.     • atorvastatin (LIPITOR) 10 MG Tab Take 10 mg by mouth every day.     • DULoxetine (CYMBALTA)  "30 MG Cap DR Particles Take 60 mg by mouth every day.     • QUEtiapine (SEROQUEL) 100 MG Tab Take 150 mg by mouth every bedtime.     • metFORMIN (GLUCOPHAGE) 500 MG Tab Take 500 mg by mouth 2 times a day, with meals.     • gabapentin (NEURONTIN) 600 MG tablet Take 600 mg by mouth 3 times a day.     • busPIRone (BUSPAR) 5 MG Tab Take 5 mg by mouth 3 times a day as needed (ANXIETY).     • meloxicam (MOBIC) 15 MG tablet Take 15 mg by mouth every day.     • lisinopril (PRINIVIL) 10 MG Tab Take 10 mg by mouth every day.         ALLERGIES  Allergies   Allergen Reactions   • Aspirin Rash and Swelling     Generalized rash, swelling in hands and feet       PHYSICAL EXAM  VITAL SIGNS: /61   Pulse 87   Temp 36.2 °C (97.1 °F) (Temporal)   Resp 16   Ht 1.651 m (5' 5\")   Wt 95.6 kg (210 lb 12.2 oz)   SpO2 98%   BMI 35.07 kg/m²     Constitutional: Patient is sitting in a chair uncomfortable appearing, Alert and in no apparent distress.  HENT: Normocephalic, Bilateral external ears normal. Nose normal.   Eyes: Pupils are equal and reactive. Conjunctiva normal, non-icteric.   Cardiovascular:  Good Perfusion  Thorax & Lungs: Easy unlabored respirations  Abdomen:  No pain with movement   Back: No midline or lumbar tenderness   Skin: Visualized skin is  Dry, No erythema, No rash.   Extremities:   Moves all extremities   Neurologic: Alert, Grossly non-focal. Equal reflexes, no clonus, equal strength.   Psychiatric: Appropriate Mood    COURSE & MEDICAL DECISION MAKING  Nursing notes and vital signs were reviewed. (See chart for details)  The patient's  records were reviewed, history was obtained from the patient;     The patient presents with low back pain, and the differential diagnosis includes but is not limited to back pain with radiculopathy with cord spasms, depression.       4:50 PM Initial treatment in the Emergency Department included Toradol 30 mg. Informed patient she must follow up with a primary care physician " "for further care and pain management, however, I will discharge her home with a prescription for Flexeril and Cymbalta. Discussed strict return precautions and follow up instructions. Patient is agreeable to the plan of care. His vitals prior to discharge are: /79   Pulse 80   Temp 36.3 °C (97.3 °F)   Resp 18   Ht 1.651 m (5' 5\")   Wt 95.6 kg (210 lb 12.2 oz)   SpO2 96%   BMI 35.07 kg/m²     The patient was discharged home with an information sheet on back pain and told to return immediately for any signs or symptoms listed.  The patient agreed to the discharge precautions and follow-up plan which is documented in EPIC.    DISPOSITION:  Patient will be discharged home in stable condition.    FOLLOW UP:  [address] [address]  [phone]        OUTPATIENT MEDICATIONS:  Discharge Medication List as of 7/19/2018  5:09 PM      START taking these medications    Details   cyclobenzaprine (FLEXERIL) 5 MG tablet Take 1-2 Tabs by mouth 3 times a day as needed., Disp-10 Tab, R-0, Print Rx Paper      !! DULoxetine (CYMBALTA) 30 MG Cap DR Particles Take 1 Cap by mouth every day., Disp-30 Cap, R-0, Print Rx Paper       !! - Potential duplicate medications found. Please discuss with provider.        FINAL IMPRESSION  1. Lumbar pain    2. Depression, unspecified depression type        I, Viktoriya Rutledge (Ashok), am scribing for, and in the presence of, Felicia Sharp M.D..    Electronically signed by: Viktoriya Rutledge (Ashok), 7/19/2018    I, Felicia Sharp M.D. personally performed the services described in this documentation, as scribed by Viktoriya Rutledge in my presence, and it is both accurate and complete.    The note accurately reflects work and decisions made by me.  Felicia Sharp  7/19/2018  8:52 PM    "

## 2018-07-20 NOTE — ED NOTES
Pt verbalizes understanding of discharge and follow-up instructions. Given Rx x2.  VSS.  All questions answered.  Ambulates to discharge with steady gait. Pt advised to follow-up with pain management specialist.

## 2018-07-20 NOTE — ED NOTES
Seen by ERP, pt advised that she will need to follow-up with pain management doctor. Pt refused Toradol injection

## 2018-11-30 ENCOUNTER — HOSPITAL ENCOUNTER (EMERGENCY)
Facility: MEDICAL CENTER | Age: 53
End: 2018-11-30
Attending: EMERGENCY MEDICINE
Payer: MEDICAID

## 2018-11-30 VITALS
SYSTOLIC BLOOD PRESSURE: 191 MMHG | BODY MASS INDEX: 32.32 KG/M2 | OXYGEN SATURATION: 97 % | WEIGHT: 194 LBS | HEIGHT: 65 IN | DIASTOLIC BLOOD PRESSURE: 113 MMHG | TEMPERATURE: 97.2 F | HEART RATE: 85 BPM | RESPIRATION RATE: 16 BRPM

## 2018-11-30 DIAGNOSIS — F41.9 ANXIETY: ICD-10-CM

## 2018-11-30 DIAGNOSIS — I10 ESSENTIAL HYPERTENSION: ICD-10-CM

## 2018-11-30 DIAGNOSIS — Z86.39 HISTORY OF DIABETES MELLITUS, TYPE II: ICD-10-CM

## 2018-11-30 LAB
ALBUMIN SERPL BCP-MCNC: 4.4 G/DL (ref 3.2–4.9)
ALBUMIN/GLOB SERPL: 1.3 G/DL
ALP SERPL-CCNC: 107 U/L (ref 30–99)
ALT SERPL-CCNC: 23 U/L (ref 2–50)
ANION GAP SERPL CALC-SCNC: 8 MMOL/L (ref 0–11.9)
AST SERPL-CCNC: 21 U/L (ref 12–45)
BASOPHILS # BLD AUTO: 0.4 % (ref 0–1.8)
BASOPHILS # BLD: 0.04 K/UL (ref 0–0.12)
BILIRUB SERPL-MCNC: 0.7 MG/DL (ref 0.1–1.5)
BUN SERPL-MCNC: 8 MG/DL (ref 8–22)
CALCIUM SERPL-MCNC: 9.6 MG/DL (ref 8.5–10.5)
CHLORIDE SERPL-SCNC: 110 MMOL/L (ref 96–112)
CO2 SERPL-SCNC: 22 MMOL/L (ref 20–33)
CREAT SERPL-MCNC: 0.76 MG/DL (ref 0.5–1.4)
EOSINOPHIL # BLD AUTO: 0.05 K/UL (ref 0–0.51)
EOSINOPHIL NFR BLD: 0.5 % (ref 0–6.9)
ERYTHROCYTE [DISTWIDTH] IN BLOOD BY AUTOMATED COUNT: 52.4 FL (ref 35.9–50)
GLOBULIN SER CALC-MCNC: 3.5 G/DL (ref 1.9–3.5)
GLUCOSE SERPL-MCNC: 104 MG/DL (ref 65–99)
HCT VFR BLD AUTO: 49.9 % (ref 37–47)
HGB BLD-MCNC: 17.2 G/DL (ref 12–16)
IMM GRANULOCYTES # BLD AUTO: 0.01 K/UL (ref 0–0.11)
IMM GRANULOCYTES NFR BLD AUTO: 0.1 % (ref 0–0.9)
LYMPHOCYTES # BLD AUTO: 2.67 K/UL (ref 1–4.8)
LYMPHOCYTES NFR BLD: 24.5 % (ref 22–41)
MCH RBC QN AUTO: 34.7 PG (ref 27–33)
MCHC RBC AUTO-ENTMCNC: 34.5 G/DL (ref 33.6–35)
MCV RBC AUTO: 100.6 FL (ref 81.4–97.8)
MONOCYTES # BLD AUTO: 0.48 K/UL (ref 0–0.85)
MONOCYTES NFR BLD AUTO: 4.4 % (ref 0–13.4)
NEUTROPHILS # BLD AUTO: 7.65 K/UL (ref 2–7.15)
NEUTROPHILS NFR BLD: 70.1 % (ref 44–72)
NRBC # BLD AUTO: 0 K/UL
NRBC BLD-RTO: 0 /100 WBC
PLATELET # BLD AUTO: 233 K/UL (ref 164–446)
PMV BLD AUTO: 10.3 FL (ref 9–12.9)
POC BREATHALIZER: 0 PERCENT (ref 0–0.01)
POTASSIUM SERPL-SCNC: 4.2 MMOL/L (ref 3.6–5.5)
PROT SERPL-MCNC: 7.9 G/DL (ref 6–8.2)
RBC # BLD AUTO: 4.96 M/UL (ref 4.2–5.4)
SODIUM SERPL-SCNC: 140 MMOL/L (ref 135–145)
WBC # BLD AUTO: 10.9 K/UL (ref 4.8–10.8)

## 2018-11-30 PROCEDURE — 96374 THER/PROPH/DIAG INJ IV PUSH: CPT

## 2018-11-30 PROCEDURE — 700111 HCHG RX REV CODE 636 W/ 250 OVERRIDE (IP): Performed by: EMERGENCY MEDICINE

## 2018-11-30 PROCEDURE — 99285 EMERGENCY DEPT VISIT HI MDM: CPT

## 2018-11-30 PROCEDURE — A9270 NON-COVERED ITEM OR SERVICE: HCPCS | Performed by: EMERGENCY MEDICINE

## 2018-11-30 PROCEDURE — 302970 POC BREATHALIZER: Performed by: EMERGENCY MEDICINE

## 2018-11-30 PROCEDURE — 80053 COMPREHEN METABOLIC PANEL: CPT

## 2018-11-30 PROCEDURE — 700102 HCHG RX REV CODE 250 W/ 637 OVERRIDE(OP): Performed by: EMERGENCY MEDICINE

## 2018-11-30 PROCEDURE — 85025 COMPLETE CBC W/AUTO DIFF WBC: CPT

## 2018-11-30 PROCEDURE — 93005 ELECTROCARDIOGRAM TRACING: CPT | Performed by: EMERGENCY MEDICINE

## 2018-11-30 RX ORDER — DULOXETIN HYDROCHLORIDE 30 MG/1
60 CAPSULE, DELAYED RELEASE ORAL DAILY
Qty: 30 CAP | Refills: 0 | Status: SHIPPED | OUTPATIENT
Start: 2018-11-30 | End: 2020-05-15

## 2018-11-30 RX ORDER — LISINOPRIL 10 MG/1
10 TABLET ORAL DAILY
Qty: 30 TAB | Refills: 0 | Status: SHIPPED | OUTPATIENT
Start: 2018-11-30 | End: 2020-05-15

## 2018-11-30 RX ORDER — DULOXETIN HYDROCHLORIDE 30 MG/1
60 CAPSULE, DELAYED RELEASE ORAL DAILY
COMMUNITY
End: 2018-11-30

## 2018-11-30 RX ORDER — ATORVASTATIN CALCIUM 10 MG/1
10 TABLET, FILM COATED ORAL NIGHTLY
COMMUNITY
End: 2018-11-30

## 2018-11-30 RX ORDER — LISINOPRIL 10 MG/1
10 TABLET ORAL ONCE
Status: COMPLETED | OUTPATIENT
Start: 2018-11-30 | End: 2018-11-30

## 2018-11-30 RX ORDER — QUETIAPINE FUMARATE 100 MG/1
100 TABLET, FILM COATED ORAL
COMMUNITY
End: 2018-11-30

## 2018-11-30 RX ORDER — QUETIAPINE FUMARATE 100 MG/1
100 TABLET, FILM COATED ORAL
Qty: 60 TAB | Refills: 0 | Status: SHIPPED | OUTPATIENT
Start: 2018-11-30 | End: 2020-05-15

## 2018-11-30 RX ORDER — LEVOTHYROXINE SODIUM 0.03 MG/1
25 TABLET ORAL
Qty: 30 TAB | Refills: 0 | Status: SHIPPED | OUTPATIENT
Start: 2018-11-30 | End: 2020-05-15

## 2018-11-30 RX ORDER — LORAZEPAM 2 MG/ML
1 INJECTION INTRAMUSCULAR ONCE
Status: COMPLETED | OUTPATIENT
Start: 2018-11-30 | End: 2018-11-30

## 2018-11-30 RX ORDER — LEVOTHYROXINE SODIUM 0.03 MG/1
25 TABLET ORAL
COMMUNITY
End: 2018-11-30

## 2018-11-30 RX ORDER — ATORVASTATIN CALCIUM 10 MG/1
10 TABLET, FILM COATED ORAL DAILY
Qty: 30 TAB | Refills: 0 | Status: SHIPPED | OUTPATIENT
Start: 2018-11-30 | End: 2020-05-15

## 2018-11-30 RX ORDER — LISINOPRIL 10 MG/1
10 TABLET ORAL DAILY
COMMUNITY
End: 2018-11-30

## 2018-11-30 RX ORDER — MELOXICAM 15 MG/1
15 TABLET ORAL DAILY
COMMUNITY
End: 2020-05-15

## 2018-11-30 RX ADMIN — LORAZEPAM 1 MG: 2 INJECTION INTRAMUSCULAR; INTRAVENOUS at 09:23

## 2018-11-30 RX ADMIN — LISINOPRIL 10 MG: 10 TABLET ORAL at 09:23

## 2018-11-30 ASSESSMENT — PAIN SCALES - GENERAL: PAINLEVEL_OUTOF10: 0

## 2018-11-30 ASSESSMENT — LIFESTYLE VARIABLES: DO YOU DRINK ALCOHOL: NO
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
## 2018-11-30 NOTE — ED NOTES
Unable to complete med rec at this time, called [facility] where patient stated she filled her medications last to verify. Patient not on record. Then I called [facility] in [facility] and had to leave a message for a return phone call

## 2018-11-30 NOTE — ED NOTES
Pt requesting food and water, per robert Crouch to feed pt. Boxed lunch provided. Pt to be discharged home with prescriptions soon.
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
## 2018-11-30 NOTE — ED NOTES
Med rec updated per [facility], they haven't filled for her in a very long time, but these are the reported meds

## 2018-11-30 NOTE — ED NOTES
Pt resting in room, attempted to perform med rec, but were unable as pt has not had medications for over 3 months and does not remember what they are. Pt medicated per MAR, anxiety is better.

## 2018-12-02 LAB — EKG IMPRESSION: NORMAL

## 2020-05-13 ENCOUNTER — HOSPITAL ENCOUNTER (EMERGENCY)
Facility: MEDICAL CENTER | Age: 55
End: 2020-05-13
Attending: EMERGENCY MEDICINE
Payer: MEDICAID

## 2020-05-13 VITALS
SYSTOLIC BLOOD PRESSURE: 181 MMHG | HEART RATE: 65 BPM | DIASTOLIC BLOOD PRESSURE: 88 MMHG | RESPIRATION RATE: 18 BRPM | HEIGHT: 64 IN | WEIGHT: 182.1 LBS | OXYGEN SATURATION: 99 % | BODY MASS INDEX: 31.09 KG/M2 | TEMPERATURE: 98.5 F

## 2020-05-13 DIAGNOSIS — I10 ESSENTIAL HYPERTENSION: ICD-10-CM

## 2020-05-13 DIAGNOSIS — Z76.0 MEDICATION REFILL: ICD-10-CM

## 2020-05-13 LAB
ALBUMIN SERPL BCP-MCNC: 4.4 G/DL (ref 3.2–4.9)
ALBUMIN/GLOB SERPL: 1.4 G/DL
ALP SERPL-CCNC: 118 U/L (ref 30–99)
ALT SERPL-CCNC: 18 U/L (ref 2–50)
ANION GAP SERPL CALC-SCNC: 12 MMOL/L (ref 7–16)
APPEARANCE UR: CLEAR
AST SERPL-CCNC: 18 U/L (ref 12–45)
BASOPHILS # BLD AUTO: 0.5 % (ref 0–1.8)
BASOPHILS # BLD: 0.04 K/UL (ref 0–0.12)
BILIRUB SERPL-MCNC: 0.4 MG/DL (ref 0.1–1.5)
BILIRUB UR QL STRIP.AUTO: NEGATIVE
BUN SERPL-MCNC: 6 MG/DL (ref 8–22)
CALCIUM SERPL-MCNC: 9.6 MG/DL (ref 8.4–10.2)
CHLORIDE SERPL-SCNC: 107 MMOL/L (ref 96–112)
CO2 SERPL-SCNC: 21 MMOL/L (ref 20–33)
COLOR UR: YELLOW
CREAT SERPL-MCNC: 0.68 MG/DL (ref 0.5–1.4)
EKG IMPRESSION: NORMAL
EOSINOPHIL # BLD AUTO: 0.09 K/UL (ref 0–0.51)
EOSINOPHIL NFR BLD: 1.1 % (ref 0–6.9)
ERYTHROCYTE [DISTWIDTH] IN BLOOD BY AUTOMATED COUNT: 49.8 FL (ref 35.9–50)
GLOBULIN SER CALC-MCNC: 3.1 G/DL (ref 1.9–3.5)
GLUCOSE SERPL-MCNC: 100 MG/DL (ref 65–99)
GLUCOSE UR STRIP.AUTO-MCNC: NEGATIVE MG/DL
HCT VFR BLD AUTO: 51.3 % (ref 37–47)
HGB BLD-MCNC: 17.2 G/DL (ref 12–16)
IMM GRANULOCYTES # BLD AUTO: 0.01 K/UL (ref 0–0.11)
IMM GRANULOCYTES NFR BLD AUTO: 0.1 % (ref 0–0.9)
KETONES UR STRIP.AUTO-MCNC: NEGATIVE MG/DL
LEUKOCYTE ESTERASE UR QL STRIP.AUTO: NEGATIVE
LYMPHOCYTES # BLD AUTO: 3.26 K/UL (ref 1–4.8)
LYMPHOCYTES NFR BLD: 38.2 % (ref 22–41)
MCH RBC QN AUTO: 35 PG (ref 27–33)
MCHC RBC AUTO-ENTMCNC: 33.5 G/DL (ref 33.6–35)
MCV RBC AUTO: 104.3 FL (ref 81.4–97.8)
MICRO URNS: NORMAL
MONOCYTES # BLD AUTO: 0.45 K/UL (ref 0–0.85)
MONOCYTES NFR BLD AUTO: 5.3 % (ref 0–13.4)
NEUTROPHILS # BLD AUTO: 4.69 K/UL (ref 2–7.15)
NEUTROPHILS NFR BLD: 54.8 % (ref 44–72)
NITRITE UR QL STRIP.AUTO: NEGATIVE
NRBC # BLD AUTO: 0 K/UL
NRBC BLD-RTO: 0 /100 WBC
PH UR STRIP.AUTO: 5.5 [PH] (ref 5–8)
PLATELET # BLD AUTO: 216 K/UL (ref 164–446)
PMV BLD AUTO: 10 FL (ref 9–12.9)
POTASSIUM SERPL-SCNC: 4.4 MMOL/L (ref 3.6–5.5)
PROT SERPL-MCNC: 7.5 G/DL (ref 6–8.2)
PROT UR QL STRIP: NEGATIVE MG/DL
RBC # BLD AUTO: 4.92 M/UL (ref 4.2–5.4)
RBC UR QL AUTO: NEGATIVE
SODIUM SERPL-SCNC: 140 MMOL/L (ref 135–145)
SP GR UR STRIP.AUTO: 1.02
T4 FREE SERPL-MCNC: 0.75 NG/DL (ref 0.93–1.7)
TSH SERPL DL<=0.005 MIU/L-ACNC: 5.42 UIU/ML (ref 0.38–5.33)
WBC # BLD AUTO: 8.5 K/UL (ref 4.8–10.8)

## 2020-05-13 PROCEDURE — 85025 COMPLETE CBC W/AUTO DIFF WBC: CPT

## 2020-05-13 PROCEDURE — 81003 URINALYSIS AUTO W/O SCOPE: CPT

## 2020-05-13 PROCEDURE — 80053 COMPREHEN METABOLIC PANEL: CPT

## 2020-05-13 PROCEDURE — 84443 ASSAY THYROID STIM HORMONE: CPT

## 2020-05-13 PROCEDURE — 84439 ASSAY OF FREE THYROXINE: CPT

## 2020-05-13 PROCEDURE — 99283 EMERGENCY DEPT VISIT LOW MDM: CPT

## 2020-05-13 PROCEDURE — 93005 ELECTROCARDIOGRAM TRACING: CPT | Performed by: EMERGENCY MEDICINE

## 2020-05-13 RX ORDER — SODIUM CHLORIDE 9 MG/ML
1000 INJECTION, SOLUTION INTRAVENOUS ONCE
Status: DISCONTINUED | OUTPATIENT
Start: 2020-05-13 | End: 2020-05-13 | Stop reason: HOSPADM

## 2020-05-13 RX ORDER — LEVOTHYROXINE SODIUM 0.03 MG/1
25 TABLET ORAL
Qty: 30 TAB | Refills: 1 | Status: ON HOLD | OUTPATIENT
Start: 2020-05-13 | End: 2020-05-20

## 2020-05-13 RX ORDER — LISINOPRIL 10 MG/1
10 TABLET ORAL DAILY
Qty: 30 TAB | Refills: 1 | Status: ON HOLD | OUTPATIENT
Start: 2020-05-13 | End: 2020-05-20

## 2020-05-13 ASSESSMENT — FIBROSIS 4 INDEX: FIB4 SCORE: 1

## 2020-05-13 NOTE — ED TRIAGE NOTES
Pt has been out of home medications for a couple of months now, she takes metformin, BP meds, and thyroid medications. Pt lost insurance and has not been able to get them filled. Pt has been experiencing dizziness and near syncopal episodes the past week and her family made her come in to get checked out, pt also does not have a blood sugar machine at home so does not know what her BGL has been running. Pt denies any fever, cough, sob or recent travel.

## 2020-05-13 NOTE — ED PROVIDER NOTES
ED Provider Note    CHIEF COMPLAINT  Chief Complaint   Patient presents with   • Medication Refill   • Dizziness       HPI  Reba CASANOVA is a 55 y.o. female who presents for evaluation of dizziness, tingling to the legs intermittent blurry vision and not feeling well in general.  The patient has a notable history of hypertension, diabetes as well as thyroid disease.  Due to the COVID-19 pandemic, she fell off of the schedule with her PCP and has not been taking any of her meds occluding her thyroid meds antihypertensives metformin for several months.  She denies high fever productive cough or suggestion positive contacts.  No other symptoms reported    REVIEW OF SYSTEMS  See HPI for further details.  No reported night sweats weight loss medication weakness rash all other systems are negative.     PAST MEDICAL HISTORY  Past Medical History:   Diagnosis Date   • Back pain    • Psychiatric disorder     depression       FAMILY HISTORY  Noncontributory    SOCIAL HISTORY  Social History     Socioeconomic History   • Marital status:      Spouse name: Not on file   • Number of children: Not on file   • Years of education: Not on file   • Highest education level: Not on file   Occupational History   • Not on file   Social Needs   • Financial resource strain: Not on file   • Food insecurity     Worry: Not on file     Inability: Not on file   • Transportation needs     Medical: Not on file     Non-medical: Not on file   Tobacco Use   • Smoking status: Current Every Day Smoker     Packs/day: 0.50     Types: Cigarettes   • Smokeless tobacco: Never Used   Substance and Sexual Activity   • Alcohol use: Yes     Comment: occ   • Drug use: No   • Sexual activity: Not on file   Lifestyle   • Physical activity     Days per week: Not on file     Minutes per session: Not on file   • Stress: Not on file   Relationships   • Social connections     Talks on phone: Not on file     Gets together: Not on file     Attends Jewish  "service: Not on file     Active member of club or organization: Not on file     Attends meetings of clubs or organizations: Not on file     Relationship status: Not on file   • Intimate partner violence     Fear of current or ex partner: Not on file     Emotionally abused: Not on file     Physically abused: Not on file     Forced sexual activity: Not on file   Other Topics Concern   • Not on file   Social History Narrative   • Not on file       SURGICAL HISTORY  Past Surgical History:   Procedure Laterality Date   • OTHER ABDOMINAL SURGERY      gallbladder removed       CURRENT MEDICATIONS    Current Facility-Administered Medications:   •  NS infusion 1,000 mL, 1,000 mL, Intravenous, Once, Brendon Navarro M.D.    Current Outpatient Medications:   •  meloxicam (MOBIC) 15 MG tablet, Take 15 mg by mouth every day., Disp: , Rfl:   •  atorvastatin (LIPITOR) 10 MG Tab, Take 1 Tab by mouth every day., Disp: 30 Tab, Rfl: 0  •  DULoxetine (CYMBALTA) 30 MG Cap DR Particles, Take 2 Caps by mouth every day., Disp: 30 Cap, Rfl: 0  •  levothyroxine (SYNTHROID) 25 MCG Tab, Take 1 Tab by mouth Every morning on an empty stomach., Disp: 30 Tab, Rfl: 0  •  lisinopril (PRINIVIL) 10 MG Tab, Take 1 Tab by mouth every day., Disp: 30 Tab, Rfl: 0  •  metFORMIN (GLUCOPHAGE) 500 MG Tab, Take 1 Tab by mouth 2 times a day, with meals., Disp: 60 Tab, Rfl: 0  •  QUEtiapine (SEROQUEL) 100 MG Tab, Take 1 Tab by mouth every bedtime., Disp: 60 Tab, Rfl: 0      ALLERGIES  Allergies   Allergen Reactions   • Aspirin Rash and Swelling     Generalized rash, swelling in hands and feet       PHYSICAL EXAM  VITAL SIGNS: BP (!) 198/117   Pulse 90   Temp 36.9 °C (98.5 °F) (Temporal)   Resp 18   Ht 1.626 m (5' 4\")   Wt 82.6 kg (182 lb 1.6 oz)   LMP 10/13/2016   SpO2 98%   BMI 31.26 kg/m²       Constitutional: Well developed, Well nourished, No acute distress, Non-toxic appearance.   HENT: Normocephalic, Atraumatic, Bilateral external ears normal, " Oropharynx moist, No oral exudates, Nose normal.   Eyes: PERRLA, EOMI, Conjunctiva normal, No discharge.   Neck: Normal range of motion, No tenderness, Supple, No stridor.    Cardiovascular: Normal heart rate, Normal rhythm, No murmurs, No rubs, No gallops.   Thorax & Lungs: Normal breath sounds, No respiratory distress, No wheezing, No chest tenderness.   Abdomen: Bowel sounds normal, Soft, No tenderness, No masses, No pulsatile masses.   Skin: Warm, Dry, No erythema, No rash.   Extremities: Intact distal pulses, No edema, No tenderness, No cyanosis, No clubbing.   Neurologic: Alert & oriented x 3, Normal motor function, Normal sensory function, No focal deficits noted.   Psychiatric: Affect normal, Judgment normal, Mood normal.     Results for orders placed or performed during the hospital encounter of 05/13/20   CBC WITH DIFFERENTIAL   Result Value Ref Range    WBC 8.5 4.8 - 10.8 K/uL    RBC 4.92 4.20 - 5.40 M/uL    Hemoglobin 17.2 (H) 12.0 - 16.0 g/dL    Hematocrit 51.3 (H) 37.0 - 47.0 %    .3 (H) 81.4 - 97.8 fL    MCH 35.0 (H) 27.0 - 33.0 pg    MCHC 33.5 (L) 33.6 - 35.0 g/dL    RDW 49.8 35.9 - 50.0 fL    Platelet Count 216 164 - 446 K/uL    MPV 10.0 9.0 - 12.9 fL    Neutrophils-Polys 54.80 44.00 - 72.00 %    Lymphocytes 38.20 22.00 - 41.00 %    Monocytes 5.30 0.00 - 13.40 %    Eosinophils 1.10 0.00 - 6.90 %    Basophils 0.50 0.00 - 1.80 %    Immature Granulocytes 0.10 0.00 - 0.90 %    Nucleated RBC 0.00 /100 WBC    Neutrophils (Absolute) 4.69 2.00 - 7.15 K/uL    Lymphs (Absolute) 3.26 1.00 - 4.80 K/uL    Monos (Absolute) 0.45 0.00 - 0.85 K/uL    Eos (Absolute) 0.09 0.00 - 0.51 K/uL    Baso (Absolute) 0.04 0.00 - 0.12 K/uL    Immature Granulocytes (abs) 0.01 0.00 - 0.11 K/uL    NRBC (Absolute) 0.00 K/uL   Comp Metabolic Panel   Result Value Ref Range    Sodium 140 135 - 145 mmol/L    Potassium 4.4 3.6 - 5.5 mmol/L    Chloride 107 96 - 112 mmol/L    Co2 21 20 - 33 mmol/L    Anion Gap 12.0 7.0 - 16.0     Glucose 100 (H) 65 - 99 mg/dL    Bun 6 (L) 8 - 22 mg/dL    Creatinine 0.68 0.50 - 1.40 mg/dL    Calcium 9.6 8.4 - 10.2 mg/dL    AST(SGOT) 18 12 - 45 U/L    ALT(SGPT) 18 2 - 50 U/L    Alkaline Phosphatase 118 (H) 30 - 99 U/L    Total Bilirubin 0.4 0.1 - 1.5 mg/dL    Albumin 4.4 3.2 - 4.9 g/dL    Total Protein 7.5 6.0 - 8.2 g/dL    Globulin 3.1 1.9 - 3.5 g/dL    A-G Ratio 1.4 g/dL   URINALYSIS    Specimen: Urine   Result Value Ref Range    Color Yellow     Character Clear     Specific Gravity 1.025 <1.035    Ph 5.5 5.0 - 8.0    Glucose Negative Negative mg/dL    Ketones Negative Negative mg/dL    Protein Negative Negative mg/dL    Bilirubin Negative Negative    Nitrite Negative Negative    Leukocyte Esterase Negative Negative    Occult Blood Negative Negative    Micro Urine Req see below    TSH   Result Value Ref Range    TSH 5.420 (H) 0.380 - 5.330 uIU/mL   FREE THYROXINE   Result Value Ref Range    Free T-4 0.75 (L) 0.93 - 1.70 ng/dL   ESTIMATED GFR   Result Value Ref Range    GFR If African American >60 >60 mL/min/1.73 m 2    GFR If Non African American >60 >60 mL/min/1.73 m 2   EKG   Result Value Ref Range    Report       [facility]    Test Date:  2020  Pt Name:    RUBEN CASANOVA                Department: [facility]  MRN:        [id number]                      Room[facility]       [facility]   Gender:     Female                       Technician: [id number]  :        [id number]                   Requested By:GAUTAM CAIN  Order #:    [id number]                    Reading MD: GAUTAM CAIN MD    Measurements  Intervals                                Axis  Rate:       69                           P:          59  ME:         156                          QRS:        34  QRSD:       88                           T:          40  QT:         404  QTc:        433    Interpretive Statements  SINUS RHYTHM  Compared to ECG 2018 09:16:45  No significant changes  Electronically Signed On  5- 12:48:18 PDT by BRENDON NAVARRO MD         EKG interpretation by me sinus rhythm rate 69 depression ischemia or arrhythmia      COURSE & MEDICAL DECISION MAKING  Pertinent Labs & Imaging studies reviewed. (See chart for details)  Patient presents here with several symptoms.  Her blood pressure was noted to be elevated but is trending down.  No evidence of endorgan ischemia.  Blood sugar is normal electrolytes are normal.  She has findings suggestive iatrogenic hypothyroidism and medication I will refer her to the [facility] and refill her antihypertensives, metformin and Synthroid    FINAL IMPRESSION  1.  1. Medication refill     2. Essential hypertension       3.  Hypothyroidism         Electronically signed by: Brendon Navarro M.D., 5/13/2020 11:54 AM

## 2020-05-13 NOTE — ED NOTES
Called pts cell phone @ [phone], pt did not answer phone.  Will try back later if pt gets admitted.

## 2020-05-15 ENCOUNTER — APPOINTMENT (OUTPATIENT)
Dept: RADIOLOGY | Facility: MEDICAL CENTER | Age: 55
End: 2020-05-15
Attending: EMERGENCY MEDICINE
Payer: MEDICAID

## 2020-05-15 ENCOUNTER — HOSPITAL ENCOUNTER (EMERGENCY)
Facility: MEDICAL CENTER | Age: 55
End: 2020-05-15
Attending: EMERGENCY MEDICINE
Payer: MEDICAID

## 2020-05-15 ENCOUNTER — HOSPITAL ENCOUNTER (INPATIENT)
Facility: MEDICAL CENTER | Age: 55
LOS: 5 days | DRG: 254 | End: 2020-05-20
Attending: HOSPITALIST | Admitting: HOSPITALIST
Payer: MEDICAID

## 2020-05-15 ENCOUNTER — HOSPITAL ENCOUNTER (OUTPATIENT)
Facility: MEDICAL CENTER | Age: 55
DRG: 254 | End: 2020-05-15
Payer: MEDICAID

## 2020-05-15 ENCOUNTER — APPOINTMENT (OUTPATIENT)
Dept: RADIOLOGY | Facility: MEDICAL CENTER | Age: 55
End: 2020-05-15
Attending: HOSPITALIST
Payer: MEDICAID

## 2020-05-15 VITALS
HEART RATE: 66 BPM | RESPIRATION RATE: 16 BRPM | SYSTOLIC BLOOD PRESSURE: 110 MMHG | DIASTOLIC BLOOD PRESSURE: 49 MMHG | WEIGHT: 179.68 LBS | BODY MASS INDEX: 30.67 KG/M2 | TEMPERATURE: 97.3 F | OXYGEN SATURATION: 94 % | HEIGHT: 64 IN

## 2020-05-15 DIAGNOSIS — G89.18 POSTOPERATIVE PAIN: ICD-10-CM

## 2020-05-15 DIAGNOSIS — I74.5 ILIAC ARTERY OCCLUSION (HCC): ICD-10-CM

## 2020-05-15 DIAGNOSIS — I99.8 PAIN OF LEFT LOWER EXTREMITY DUE TO ISCHEMIA: Primary | ICD-10-CM

## 2020-05-15 DIAGNOSIS — M79.605 PAIN OF LEFT LOWER EXTREMITY DUE TO ISCHEMIA: Primary | ICD-10-CM

## 2020-05-15 DIAGNOSIS — I73.9 CLAUDICATION (HCC): ICD-10-CM

## 2020-05-15 PROBLEM — R71.8 ELEVATED HEMATOCRIT: Status: ACTIVE | Noted: 2020-05-15

## 2020-05-15 LAB
ALBUMIN SERPL BCP-MCNC: 4.4 G/DL (ref 3.2–4.9)
ALBUMIN/GLOB SERPL: 1.5 G/DL
ALP SERPL-CCNC: 112 U/L (ref 30–99)
ALT SERPL-CCNC: 17 U/L (ref 2–50)
ANION GAP SERPL CALC-SCNC: 14 MMOL/L (ref 7–16)
APTT PPP: 24.9 SEC (ref 24.7–36)
AST SERPL-CCNC: 18 U/L (ref 12–45)
BASOPHILS # BLD AUTO: 0.3 % (ref 0–1.8)
BASOPHILS # BLD: 0.03 K/UL (ref 0–0.12)
BILIRUB SERPL-MCNC: 0.4 MG/DL (ref 0.1–1.5)
BUN SERPL-MCNC: 9 MG/DL (ref 8–22)
CALCIUM SERPL-MCNC: 9.5 MG/DL (ref 8.4–10.2)
CHLORIDE SERPL-SCNC: 106 MMOL/L (ref 96–112)
CO2 SERPL-SCNC: 20 MMOL/L (ref 20–33)
COVID ORDER STATUS COVID19: NORMAL
CREAT SERPL-MCNC: 0.77 MG/DL (ref 0.5–1.4)
EKG IMPRESSION: NORMAL
EOSINOPHIL # BLD AUTO: 0.06 K/UL (ref 0–0.51)
EOSINOPHIL NFR BLD: 0.7 % (ref 0–6.9)
ERYTHROCYTE [DISTWIDTH] IN BLOOD BY AUTOMATED COUNT: 49.6 FL (ref 35.9–50)
GLOBULIN SER CALC-MCNC: 3 G/DL (ref 1.9–3.5)
GLUCOSE SERPL-MCNC: 93 MG/DL (ref 65–99)
HCT VFR BLD AUTO: 49.2 % (ref 37–47)
HGB BLD-MCNC: 16.9 G/DL (ref 12–16)
IMM GRANULOCYTES # BLD AUTO: 0.03 K/UL (ref 0–0.11)
IMM GRANULOCYTES NFR BLD AUTO: 0.3 % (ref 0–0.9)
INR PPP: 0.85 (ref 0.87–1.13)
LYMPHOCYTES # BLD AUTO: 2.84 K/UL (ref 1–4.8)
LYMPHOCYTES NFR BLD: 31.7 % (ref 22–41)
MCH RBC QN AUTO: 35.5 PG (ref 27–33)
MCHC RBC AUTO-ENTMCNC: 34.3 G/DL (ref 33.6–35)
MCV RBC AUTO: 103.4 FL (ref 81.4–97.8)
MONOCYTES # BLD AUTO: 0.47 K/UL (ref 0–0.85)
MONOCYTES NFR BLD AUTO: 5.2 % (ref 0–13.4)
NEUTROPHILS # BLD AUTO: 5.53 K/UL (ref 2–7.15)
NEUTROPHILS NFR BLD: 61.8 % (ref 44–72)
NRBC # BLD AUTO: 0 K/UL
NRBC BLD-RTO: 0 /100 WBC
PLATELET # BLD AUTO: 208 K/UL (ref 164–446)
PMV BLD AUTO: 10.2 FL (ref 9–12.9)
POTASSIUM SERPL-SCNC: 4.4 MMOL/L (ref 3.6–5.5)
PROT SERPL-MCNC: 7.4 G/DL (ref 6–8.2)
PROTHROMBIN TIME: 11.7 SEC (ref 12–14.6)
RBC # BLD AUTO: 4.76 M/UL (ref 4.2–5.4)
SARS-COV-2 RNA RESP QL NAA+PROBE: NOTDETECTED
SODIUM SERPL-SCNC: 140 MMOL/L (ref 135–145)
SPECIMEN SOURCE: NORMAL
WBC # BLD AUTO: 9 K/UL (ref 4.8–10.8)

## 2020-05-15 PROCEDURE — 93926 LOWER EXTREMITY STUDY: CPT | Mod: 26 | Performed by: INTERNAL MEDICINE

## 2020-05-15 PROCEDURE — 93005 ELECTROCARDIOGRAM TRACING: CPT | Performed by: EMERGENCY MEDICINE

## 2020-05-15 PROCEDURE — 72148 MRI LUMBAR SPINE W/O DYE: CPT

## 2020-05-15 PROCEDURE — U0004 COV-19 TEST NON-CDC HGH THRU: HCPCS

## 2020-05-15 PROCEDURE — 96365 THER/PROPH/DIAG IV INF INIT: CPT

## 2020-05-15 PROCEDURE — 93922 UPR/L XTREMITY ART 2 LEVELS: CPT

## 2020-05-15 PROCEDURE — 36415 COLL VENOUS BLD VENIPUNCTURE: CPT

## 2020-05-15 PROCEDURE — 94760 N-INVAS EAR/PLS OXIMETRY 1: CPT

## 2020-05-15 PROCEDURE — 96375 TX/PRO/DX INJ NEW DRUG ADDON: CPT

## 2020-05-15 PROCEDURE — 85610 PROTHROMBIN TIME: CPT

## 2020-05-15 PROCEDURE — 80053 COMPREHEN METABOLIC PANEL: CPT

## 2020-05-15 PROCEDURE — A9270 NON-COVERED ITEM OR SERVICE: HCPCS | Performed by: EMERGENCY MEDICINE

## 2020-05-15 PROCEDURE — 93926 LOWER EXTREMITY STUDY: CPT | Mod: LT

## 2020-05-15 PROCEDURE — 85730 THROMBOPLASTIN TIME PARTIAL: CPT

## 2020-05-15 PROCEDURE — C9803 HOPD COVID-19 SPEC COLLECT: HCPCS | Performed by: EMERGENCY MEDICINE

## 2020-05-15 PROCEDURE — 85025 COMPLETE CBC W/AUTO DIFF WBC: CPT

## 2020-05-15 PROCEDURE — 700111 HCHG RX REV CODE 636 W/ 250 OVERRIDE (IP): Performed by: EMERGENCY MEDICINE

## 2020-05-15 PROCEDURE — 96366 THER/PROPH/DIAG IV INF ADDON: CPT

## 2020-05-15 PROCEDURE — A9270 NON-COVERED ITEM OR SERVICE: HCPCS | Performed by: HOSPITALIST

## 2020-05-15 PROCEDURE — 99285 EMERGENCY DEPT VISIT HI MDM: CPT | Performed by: HOSPITALIST

## 2020-05-15 PROCEDURE — 700102 HCHG RX REV CODE 250 W/ 637 OVERRIDE(OP): Performed by: HOSPITALIST

## 2020-05-15 PROCEDURE — 770006 HCHG ROOM/CARE - MED/SURG/GYN SEMI*

## 2020-05-15 PROCEDURE — 99285 EMERGENCY DEPT VISIT HI MDM: CPT

## 2020-05-15 PROCEDURE — 700102 HCHG RX REV CODE 250 W/ 637 OVERRIDE(OP): Performed by: EMERGENCY MEDICINE

## 2020-05-15 RX ORDER — BISACODYL 10 MG
10 SUPPOSITORY, RECTAL RECTAL
Status: DISCONTINUED | OUTPATIENT
Start: 2020-05-15 | End: 2020-05-20 | Stop reason: HOSPADM

## 2020-05-15 RX ORDER — HYDROMORPHONE HYDROCHLORIDE 1 MG/ML
0.25 INJECTION, SOLUTION INTRAMUSCULAR; INTRAVENOUS; SUBCUTANEOUS
Status: DISCONTINUED | OUTPATIENT
Start: 2020-05-15 | End: 2020-05-20 | Stop reason: HOSPADM

## 2020-05-15 RX ORDER — ONDANSETRON 2 MG/ML
4 INJECTION INTRAMUSCULAR; INTRAVENOUS EVERY 4 HOURS PRN
Status: DISCONTINUED | OUTPATIENT
Start: 2020-05-15 | End: 2020-05-20 | Stop reason: HOSPADM

## 2020-05-15 RX ORDER — HYDROMORPHONE HYDROCHLORIDE 1 MG/ML
0.25 INJECTION, SOLUTION INTRAMUSCULAR; INTRAVENOUS; SUBCUTANEOUS
Status: CANCELLED | OUTPATIENT
Start: 2020-05-15

## 2020-05-15 RX ORDER — IBUPROFEN 600 MG/1
600 TABLET ORAL ONCE
Status: COMPLETED | OUTPATIENT
Start: 2020-05-15 | End: 2020-05-15

## 2020-05-15 RX ORDER — ACETAMINOPHEN 500 MG
1000 TABLET ORAL
COMMUNITY
End: 2021-03-13

## 2020-05-15 RX ORDER — OXYCODONE HYDROCHLORIDE 5 MG/1
5 TABLET ORAL
Status: CANCELLED | OUTPATIENT
Start: 2020-05-15

## 2020-05-15 RX ORDER — AMOXICILLIN 250 MG
2 CAPSULE ORAL 2 TIMES DAILY
Status: DISCONTINUED | OUTPATIENT
Start: 2020-05-15 | End: 2020-05-20 | Stop reason: HOSPADM

## 2020-05-15 RX ORDER — OXYCODONE HYDROCHLORIDE 5 MG/1
5 TABLET ORAL
Status: DISCONTINUED | OUTPATIENT
Start: 2020-05-15 | End: 2020-05-20 | Stop reason: HOSPADM

## 2020-05-15 RX ORDER — AMOXICILLIN 250 MG
2 CAPSULE ORAL 2 TIMES DAILY
Status: CANCELLED | OUTPATIENT
Start: 2020-05-15

## 2020-05-15 RX ORDER — HEPARIN SODIUM 1000 [USP'U]/ML
5000 INJECTION, SOLUTION INTRAVENOUS; SUBCUTANEOUS ONCE
Status: DISCONTINUED | OUTPATIENT
Start: 2020-05-15 | End: 2020-05-15

## 2020-05-15 RX ORDER — ONDANSETRON 2 MG/ML
4 INJECTION INTRAMUSCULAR; INTRAVENOUS EVERY 4 HOURS PRN
Status: CANCELLED | OUTPATIENT
Start: 2020-05-15

## 2020-05-15 RX ORDER — POLYETHYLENE GLYCOL 3350 17 G/17G
1 POWDER, FOR SOLUTION ORAL
Status: CANCELLED | OUTPATIENT
Start: 2020-05-15

## 2020-05-15 RX ORDER — HEPARIN SODIUM 1000 [USP'U]/ML
2600 INJECTION, SOLUTION INTRAVENOUS; SUBCUTANEOUS PRN
Status: DISCONTINUED | OUTPATIENT
Start: 2020-05-15 | End: 2020-05-17

## 2020-05-15 RX ORDER — DEXTROSE MONOHYDRATE 25 G/50ML
50 INJECTION, SOLUTION INTRAVENOUS
Status: DISCONTINUED | OUTPATIENT
Start: 2020-05-15 | End: 2020-05-19

## 2020-05-15 RX ORDER — HEPARIN SODIUM 5000 [USP'U]/100ML
INJECTION, SOLUTION INTRAVENOUS CONTINUOUS
Status: DISCONTINUED | OUTPATIENT
Start: 2020-05-15 | End: 2020-05-15 | Stop reason: HOSPADM

## 2020-05-15 RX ORDER — OXYCODONE AND ACETAMINOPHEN 10; 325 MG/1; MG/1
1 TABLET ORAL ONCE
Status: DISCONTINUED | OUTPATIENT
Start: 2020-05-15 | End: 2020-05-15 | Stop reason: CLARIF

## 2020-05-15 RX ORDER — POLYETHYLENE GLYCOL 3350 17 G/17G
1 POWDER, FOR SOLUTION ORAL
Status: DISCONTINUED | OUTPATIENT
Start: 2020-05-15 | End: 2020-05-20 | Stop reason: HOSPADM

## 2020-05-15 RX ORDER — PROMETHAZINE HYDROCHLORIDE 25 MG/1
12.5-25 TABLET ORAL EVERY 4 HOURS PRN
Status: CANCELLED | OUTPATIENT
Start: 2020-05-15

## 2020-05-15 RX ORDER — PROCHLORPERAZINE EDISYLATE 5 MG/ML
5-10 INJECTION INTRAMUSCULAR; INTRAVENOUS EVERY 4 HOURS PRN
Status: DISCONTINUED | OUTPATIENT
Start: 2020-05-15 | End: 2020-05-20 | Stop reason: HOSPADM

## 2020-05-15 RX ORDER — HEPARIN SODIUM 1000 [USP'U]/ML
2600 INJECTION, SOLUTION INTRAVENOUS; SUBCUTANEOUS PRN
Status: CANCELLED | OUTPATIENT
Start: 2020-05-15

## 2020-05-15 RX ORDER — ACETAMINOPHEN 325 MG/1
650 TABLET ORAL EVERY 6 HOURS PRN
Status: CANCELLED | OUTPATIENT
Start: 2020-05-15

## 2020-05-15 RX ORDER — ONDANSETRON 4 MG/1
4 TABLET, ORALLY DISINTEGRATING ORAL EVERY 4 HOURS PRN
Status: CANCELLED | OUTPATIENT
Start: 2020-05-15

## 2020-05-15 RX ORDER — ACETAMINOPHEN 325 MG/1
650 TABLET ORAL EVERY 6 HOURS PRN
Status: DISCONTINUED | OUTPATIENT
Start: 2020-05-15 | End: 2020-05-20 | Stop reason: HOSPADM

## 2020-05-15 RX ORDER — OXYCODONE AND ACETAMINOPHEN 10; 325 MG/1; MG/1
1 TABLET ORAL ONCE
Status: COMPLETED | OUTPATIENT
Start: 2020-05-15 | End: 2020-05-15

## 2020-05-15 RX ORDER — NICOTINE 21 MG/24HR
1 PATCH, TRANSDERMAL 24 HOURS TRANSDERMAL ONCE
Status: DISCONTINUED | OUTPATIENT
Start: 2020-05-15 | End: 2020-05-15 | Stop reason: HOSPADM

## 2020-05-15 RX ORDER — PROCHLORPERAZINE EDISYLATE 5 MG/ML
5-10 INJECTION INTRAMUSCULAR; INTRAVENOUS EVERY 4 HOURS PRN
Status: CANCELLED | OUTPATIENT
Start: 2020-05-15

## 2020-05-15 RX ORDER — HEPARIN SODIUM 5000 [USP'U]/100ML
INJECTION, SOLUTION INTRAVENOUS CONTINUOUS
Status: DISCONTINUED | OUTPATIENT
Start: 2020-05-15 | End: 2020-05-17

## 2020-05-15 RX ORDER — LISINOPRIL 20 MG/1
20 TABLET ORAL DAILY
Status: CANCELLED | OUTPATIENT
Start: 2020-05-15

## 2020-05-15 RX ORDER — HEPARIN SODIUM 1000 [USP'U]/ML
5000 INJECTION, SOLUTION INTRAVENOUS; SUBCUTANEOUS ONCE
Status: DISCONTINUED | OUTPATIENT
Start: 2020-05-15 | End: 2020-05-16

## 2020-05-15 RX ORDER — HEPARIN SODIUM 5000 [USP'U]/100ML
INJECTION, SOLUTION INTRAVENOUS CONTINUOUS
Status: CANCELLED | OUTPATIENT
Start: 2020-05-15

## 2020-05-15 RX ORDER — PROMETHAZINE HYDROCHLORIDE 25 MG/1
12.5-25 SUPPOSITORY RECTAL EVERY 4 HOURS PRN
Status: DISCONTINUED | OUTPATIENT
Start: 2020-05-15 | End: 2020-05-20 | Stop reason: HOSPADM

## 2020-05-15 RX ORDER — PROMETHAZINE HYDROCHLORIDE 25 MG/1
12.5-25 SUPPOSITORY RECTAL EVERY 4 HOURS PRN
Status: CANCELLED | OUTPATIENT
Start: 2020-05-15

## 2020-05-15 RX ORDER — LISINOPRIL 20 MG/1
20 TABLET ORAL DAILY
Status: DISCONTINUED | OUTPATIENT
Start: 2020-05-17 | End: 2020-05-20 | Stop reason: HOSPADM

## 2020-05-15 RX ORDER — DEXTROSE MONOHYDRATE 25 G/50ML
50 INJECTION, SOLUTION INTRAVENOUS
Status: CANCELLED | OUTPATIENT
Start: 2020-05-15

## 2020-05-15 RX ORDER — LORAZEPAM 1 MG/1
1 TABLET ORAL ONCE
Status: COMPLETED | OUTPATIENT
Start: 2020-05-15 | End: 2020-05-15

## 2020-05-15 RX ORDER — OXYCODONE HYDROCHLORIDE 5 MG/1
2.5 TABLET ORAL
Status: CANCELLED | OUTPATIENT
Start: 2020-05-15

## 2020-05-15 RX ORDER — OXYCODONE HYDROCHLORIDE 5 MG/1
2.5 TABLET ORAL
Status: DISCONTINUED | OUTPATIENT
Start: 2020-05-15 | End: 2020-05-20 | Stop reason: HOSPADM

## 2020-05-15 RX ORDER — LISINOPRIL 20 MG/1
20 TABLET ORAL DAILY
Status: DISCONTINUED | OUTPATIENT
Start: 2020-05-15 | End: 2020-05-15 | Stop reason: HOSPADM

## 2020-05-15 RX ORDER — ONDANSETRON 4 MG/1
4 TABLET, ORALLY DISINTEGRATING ORAL EVERY 4 HOURS PRN
Status: DISCONTINUED | OUTPATIENT
Start: 2020-05-15 | End: 2020-05-20 | Stop reason: HOSPADM

## 2020-05-15 RX ORDER — BISACODYL 10 MG
10 SUPPOSITORY, RECTAL RECTAL
Status: CANCELLED | OUTPATIENT
Start: 2020-05-15

## 2020-05-15 RX ORDER — PROMETHAZINE HYDROCHLORIDE 25 MG/1
12.5-25 TABLET ORAL EVERY 4 HOURS PRN
Status: DISCONTINUED | OUTPATIENT
Start: 2020-05-15 | End: 2020-05-20 | Stop reason: HOSPADM

## 2020-05-15 RX ORDER — HEPARIN SODIUM 1000 [USP'U]/ML
5000 INJECTION, SOLUTION INTRAVENOUS; SUBCUTANEOUS ONCE
Status: CANCELLED | OUTPATIENT
Start: 2020-05-15 | End: 2020-05-16

## 2020-05-15 RX ORDER — HEPARIN SODIUM 1000 [USP'U]/ML
2600 INJECTION, SOLUTION INTRAVENOUS; SUBCUTANEOUS PRN
Status: DISCONTINUED | OUTPATIENT
Start: 2020-05-15 | End: 2020-05-15 | Stop reason: HOSPADM

## 2020-05-15 RX ADMIN — HEPARIN SODIUM 1050 UNITS/HR: 5000 INJECTION, SOLUTION INTRAVENOUS at 17:56

## 2020-05-15 RX ADMIN — NICOTINE 14 MG: 14 PATCH, EXTENDED RELEASE TRANSDERMAL at 17:38

## 2020-05-15 RX ADMIN — LORAZEPAM 1 MG: 1 TABLET ORAL at 17:29

## 2020-05-15 RX ADMIN — OXYCODONE HYDROCHLORIDE AND ACETAMINOPHEN 1 TABLET: 10; 325 TABLET ORAL at 17:27

## 2020-05-15 RX ADMIN — LISINOPRIL 20 MG: 20 TABLET ORAL at 18:01

## 2020-05-15 RX ADMIN — HEPARIN SODIUM 5000 UNITS: 1000 INJECTION, SOLUTION INTRAVENOUS; SUBCUTANEOUS at 17:56

## 2020-05-15 RX ADMIN — IBUPROFEN 600 MG: 600 TABLET ORAL at 16:16

## 2020-05-15 SDOH — ECONOMIC STABILITY: FOOD INSECURITY: WITHIN THE PAST 12 MONTHS, THE FOOD YOU BOUGHT JUST DIDN'T LAST AND YOU DIDN'T HAVE MONEY TO GET MORE.: NEVER TRUE

## 2020-05-15 SDOH — HEALTH STABILITY: MENTAL HEALTH: HOW MANY STANDARD DRINKS CONTAINING ALCOHOL DO YOU HAVE ON A TYPICAL DAY?: 3 OR 4

## 2020-05-15 SDOH — HEALTH STABILITY: MENTAL HEALTH: HOW OFTEN DO YOU HAVE 6 OR MORE DRINKS ON ONE OCCASION?: LESS THAN MONTHLY

## 2020-05-15 SDOH — HEALTH STABILITY: MENTAL HEALTH: HOW OFTEN DO YOU HAVE A DRINK CONTAINING ALCOHOL?: 2-3 TIMES A WEEK

## 2020-05-15 SDOH — ECONOMIC STABILITY: TRANSPORTATION INSECURITY
IN THE PAST 12 MONTHS, HAS LACK OF TRANSPORTATION KEPT YOU FROM MEETINGS, WORK, OR FROM GETTING THINGS NEEDED FOR DAILY LIVING?: NO

## 2020-05-15 SDOH — ECONOMIC STABILITY: TRANSPORTATION INSECURITY
IN THE PAST 12 MONTHS, HAS THE LACK OF TRANSPORTATION KEPT YOU FROM MEDICAL APPOINTMENTS OR FROM GETTING MEDICATIONS?: NO

## 2020-05-15 SDOH — ECONOMIC STABILITY: FOOD INSECURITY: WITHIN THE PAST 12 MONTHS, YOU WORRIED THAT YOUR FOOD WOULD RUN OUT BEFORE YOU GOT MONEY TO BUY MORE.: NEVER TRUE

## 2020-05-15 ASSESSMENT — LIFESTYLE VARIABLES
HOW MANY TIMES IN THE PAST YEAR HAVE YOU HAD 5 OR MORE DRINKS IN A DAY: 0
CONSUMPTION TOTAL: NEGATIVE
EVER_SMOKED: YES
HAVE PEOPLE ANNOYED YOU BY CRITICIZING YOUR DRINKING: NO
DOES PATIENT WANT TO STOP DRINKING: NO
TOTAL SCORE: 0
ON A TYPICAL DAY WHEN YOU DRINK ALCOHOL HOW MANY DRINKS DO YOU HAVE: 0
TOTAL SCORE: 0
TOTAL SCORE: 0
EVER HAD A DRINK FIRST THING IN THE MORNING TO STEADY YOUR NERVES TO GET RID OF A HANGOVER: NO
EVER FELT BAD OR GUILTY ABOUT YOUR DRINKING: NO
HAVE YOU EVER FELT YOU SHOULD CUT DOWN ON YOUR DRINKING: NO
AVERAGE NUMBER OF DAYS PER WEEK YOU HAVE A DRINK CONTAINING ALCOHOL: 0
ALCOHOL_USE: YES

## 2020-05-15 ASSESSMENT — COGNITIVE AND FUNCTIONAL STATUS - GENERAL
SUGGESTED CMS G CODE MODIFIER MOBILITY: CI
SUGGESTED CMS G CODE MODIFIER DAILY ACTIVITY: CH
CLIMB 3 TO 5 STEPS WITH RAILING: A LITTLE
MOBILITY SCORE: 23
DAILY ACTIVITIY SCORE: 24

## 2020-05-15 ASSESSMENT — FIBROSIS 4 INDEX
FIB4 SCORE: 1.04
FIB4 SCORE: 1.11
FIB4 SCORE: 1.11

## 2020-05-15 ASSESSMENT — COPD QUESTIONNAIRES
HAVE YOU SMOKED AT LEAST 100 CIGARETTES IN YOUR ENTIRE LIFE: YES
COPD SCREENING SCORE: 3
DURING THE PAST 4 WEEKS HOW MUCH DID YOU FEEL SHORT OF BREATH: NONE/LITTLE OF THE TIME
IN THE PAST 12 MONTHS DO YOU DO LESS THAN YOU USED TO BECAUSE OF YOUR BREATHING PROBLEMS: DISAGREE/UNSURE
DO YOU EVER COUGH UP ANY MUCUS OR PHLEGM?: NO/ONLY WITH OCCASIONAL COLDS OR INFECTIONS

## 2020-05-15 ASSESSMENT — PATIENT HEALTH QUESTIONNAIRE - PHQ9
SUM OF ALL RESPONSES TO PHQ9 QUESTIONS 1 AND 2: 0
2. FEELING DOWN, DEPRESSED, IRRITABLE, OR HOPELESS: NOT AT ALL
1. LITTLE INTEREST OR PLEASURE IN DOING THINGS: NOT AT ALL

## 2020-05-15 NOTE — ED NOTES
Pt assisted back to room  C/o continued pain  Aware due to her not having a ride home  Narcotic can not be given   Pt requested motrin then  MD aware  Scanner in room not working  PO med given  US being done at BS now

## 2020-05-15 NOTE — ED NOTES
1515 Returned from MRI No change in condition Still c/o LT foot numbness MILLER x 4 No weakness Also repositioned in bed on LT side  More pillows for comfort POC reviewed Pt awaiting results   1550 U/S at bedside Attempted to medicate pt for pain Pt has no ride Drove herself her and can't call for  A ride  Narcotic withheld Pt declined tylenol or motrin

## 2020-05-15 NOTE — ED NOTES
Med Rec completed per patient over the phone   Allergies reviewed  No ORAL antibiotics in last 14 days    Patient has been out of Lipitor 10 mg daily , Meloxicam 15 mg daily and Cymbalta 30 mg 2 once daily

## 2020-05-15 NOTE — ED NOTES
1425 MRI screening done  1440 Assessment done POC reviewed Pending MRI and lab results  1444 To MRI

## 2020-05-15 NOTE — ED TRIAGE NOTES
"Chief Complaint   Patient presents with   • Numbness     left foot, \"its so numb that its painful\", bottom of foot and toes, white in appearance. Pt states numbness has been present for years and its getting worse, hard to walk. Pt was recently seen on tuesday.     /85   Pulse 100   Temp 36.3 °C (97.3 °F) (Temporal)   Resp 18   Ht 1.626 m (5' 4\")   Wt 81.5 kg (179 lb 10.8 oz)   LMP 10/13/2016   SpO2 95%   BMI 30.84 kg/m²     Pt arrived w/ above concern.     COVID-19 Screen (-)    Pt phone number [phone]  [facility]  "

## 2020-05-15 NOTE — ED PROVIDER NOTES
"ED Provider Note    CHIEF COMPLAINT  Chief Complaint   Patient presents with   • Numbness     left foot, \"its so numb that its painful\", bottom of foot and toes, white in appearance. Pt states numbness has been present for years and its getting worse, hard to walk. Pt was recently seen on tuesday.       HPI  Reba CASANOVA is a 55 y.o. female who presents with a history of chronic back pain, she smokes 1/2 pack of cigarettes per day.  She presents with severe left foot pain and what she describes as numbness, cold and pale left foot.  The patient reports the pain is worse when the foot is down instead of 1 and is up.  She currently denies back pain.  She denies incontinence of urine or saddle anesthesia.  She denies fever.  She reports she chronically has had problems with that foot but it is gotten much worse and today she had to leave work because of the pain in the foot.    REVIEW OF SYSTEMS  See HPI for further details. All other systems are negative.     PAST MEDICAL HISTORY   has a past medical history of Back pain and Psychiatric disorder.    SOCIAL HISTORY  Social History     Tobacco Use   • Smoking status: Current Every Day Smoker     Packs/day: 0.50     Types: Cigarettes   • Smokeless tobacco: Never Used   Substance and Sexual Activity   • Alcohol use: Yes     Comment: occ   • Drug use: No   • Sexual activity: Not on file       SURGICAL HISTORY   has a past surgical history that includes other abdominal surgery.    CURRENT MEDICATIONS  Home Medications     Reviewed by Melia Dumont (Pharmacy Tech) on 05/15/20 at 1314  Med List Status: Complete   Medication Last Dose Status   levothyroxine (SYNTHROID) 25 MCG Tab 5/15/2020 Active   lisinopril (PRINIVIL) 10 MG Tab 5/15/2020 Active   metFORMIN (GLUCOPHAGE) 500 MG Tab 5/15/2020 Active                ALLERGIES  Allergies   Allergen Reactions   • Aspirin Rash and Swelling     Generalized rash, swelling in hands and feet       PHYSICAL EXAM  VITAL " "SIGNS: BP (!) 197/91   Pulse 83   Temp 36.3 °C (97.3 °F) (Temporal)   Resp 16   Ht 1.626 m (5' 4\")   Wt 81.5 kg (179 lb 10.8 oz)   LMP 10/13/2016   SpO2 98%   BMI 30.84 kg/m²  @RIOS[[id number]::@   Pulse ox interpretation: I interpret this pulse ox as normal.  Constitutional: Alert in no apparent distress.  HENT: No signs of trauma, Bilateral external ears normal, Nose normal.   Eyes: Pupils are equal and reactive, Conjunctiva normal, Non-icteric.   Neck: Normal range of motion, No tenderness, Supple, No stridor.   Lymphatic: No lymphadenopathy noted.   Cardiovascular: Regular rate and rhythm, no murmurs.   Thorax & Lungs: Normal breath sounds, No respiratory distress, No wheezing, No chest tenderness.   Abdomen: Bowel sounds normal, Soft, No tenderness, No masses, No pulsatile masses. No peritoneal signs.  Skin: Warm, Dry, No erythema, No rash.   Back: No bony tenderness, No CVA tenderness.   Extremities: Palpable but decreased left dorsalis pedis pulse, the foot appears pale and feels cooler compared to the right foot.  Musculoskeletal: Good range of motion in all major joints. No tenderness to palpation or major deformities noted.   Neurologic: Alert , Normal motor function, Normal sensory function, No focal deficits noted.   Psychiatric: Affect normal, Judgment normal, Mood normal.       DIAGNOSTIC STUDIES / PROCEDURES    EKG  This is a 12-lead EKG interpretation by myself.  It is normal sinus rhythm at a rate of 66.  The axis is normal.  The intervals are normal.  There is no ST elevation or depression.  My impression of this EKG, it does not indicate ischemia nor arrhythmia at this time.    LABS  Labs Reviewed   CBC WITH DIFFERENTIAL - Abnormal; Notable for the following components:       Result Value    Hemoglobin 16.9 (*)     Hematocrit 49.2 (*)     .4 (*)     MCH 35.5 (*)     All other components within normal limits    Narrative:     Indicate which anticoagulants the patient is on:->UNKNOWN "   COMP METABOLIC PANEL - Abnormal; Notable for the following components:    Alkaline Phosphatase 112 (*)     All other components within normal limits    Narrative:     Indicate which anticoagulants the patient is on:->UNKNOWN   PROTHROMBIN TIME - Abnormal; Notable for the following components:    PT 11.7 (*)     INR 0.85 (*)     All other components within normal limits    Narrative:     Indicate which anticoagulants the patient is on:->UNKNOWN   APTT    Narrative:     Indicate which anticoagulants the patient is on:->UNKNOWN   ESTIMATED GFR    Narrative:     Indicate which anticoagulants the patient is on:->UNKNOWN   COVID/SARS COV-2         RADIOLOGY  US-ABIGAIL SINGLE LEVEL BILAT         MR-LUMBAR SPINE-W/O   Final Result      1.  No significant degenerative disc disease.      2.  No discrete disc herniation or isolated nerve root compression.      3.  Facet arthropathy in the lower lumbar spine.      US-EXTREMITY ARTERY LOWER UNILAT LEFT    (Results Pending)           COURSE & MEDICAL DECISION MAKING  Pertinent Labs & Imaging studies reviewed. (See chart for details)    Differential diagnosis: Claudication of left lower extremity, spinal cord compression    At this time we will start with an ultrasound of the left lower extremity to evaluate for claudication.    Patient's MRI is negative for spinal or compression.      Patient's ultrasound is positive for iliac artery occlusion.    I spoke with  who is on-call vascular surgery.  He has requested a CTA of the aorta with runoff.  The patient will need to be transferred to [facility], I spoke with the CT tech here at [facility] and they are unable to do the CTA with runoff.  He has requested weight-based heparin IV with bolus.  I have ordered this.  I spoke with Dr. Sepulveda who will assess the patient for admission to [facility].  Preop COVID test is pending.    Patient is feeling anxious, she is given Ativan 1 mg p.o. and a nicotine patch.  The patient  is in fair condition at the time of admission.          FINAL IMPRESSION  1. Claudication (HCC)     2. Iliac artery occlusion (HCC)                Electronically signed by: Henok Alcantara M.D., 5/15/2020 2:06 PM

## 2020-05-16 ENCOUNTER — APPOINTMENT (OUTPATIENT)
Dept: RADIOLOGY | Facility: MEDICAL CENTER | Age: 55
DRG: 254 | End: 2020-05-16
Attending: HOSPITALIST
Payer: MEDICAID

## 2020-05-16 PROBLEM — E11.59 TYPE 2 DIABETES MELLITUS WITH CIRCULATORY DISORDER, WITHOUT LONG-TERM CURRENT USE OF INSULIN (HCC): Status: ACTIVE | Noted: 2020-05-16

## 2020-05-16 PROBLEM — E03.9 HYPOTHYROID: Status: ACTIVE | Noted: 2020-05-16

## 2020-05-16 PROBLEM — F17.200 TOBACCO USE DISORDER: Status: ACTIVE | Noted: 2020-05-16

## 2020-05-16 PROBLEM — I10 ESSENTIAL HYPERTENSION: Status: ACTIVE | Noted: 2020-05-16

## 2020-05-16 LAB
ALBUMIN SERPL BCP-MCNC: 3.8 G/DL (ref 3.2–4.9)
ALBUMIN/GLOB SERPL: 1.4 G/DL
ALP SERPL-CCNC: 89 U/L (ref 30–99)
ALT SERPL-CCNC: 18 U/L (ref 2–50)
ANION GAP SERPL CALC-SCNC: 14 MMOL/L (ref 7–16)
APTT PPP: 63 SEC (ref 24.7–36)
APTT PPP: 81.6 SEC (ref 24.7–36)
AST SERPL-CCNC: 18 U/L (ref 12–45)
BASOPHILS # BLD AUTO: 0.4 % (ref 0–1.8)
BASOPHILS # BLD: 0.03 K/UL (ref 0–0.12)
BILIRUB SERPL-MCNC: 0.5 MG/DL (ref 0.1–1.5)
BUN SERPL-MCNC: 11 MG/DL (ref 8–22)
CALCIUM SERPL-MCNC: 9.1 MG/DL (ref 8.5–10.5)
CHLORIDE SERPL-SCNC: 103 MMOL/L (ref 96–112)
CO2 SERPL-SCNC: 21 MMOL/L (ref 20–33)
CREAT SERPL-MCNC: 0.69 MG/DL (ref 0.5–1.4)
EOSINOPHIL # BLD AUTO: 0.09 K/UL (ref 0–0.51)
EOSINOPHIL NFR BLD: 1.3 % (ref 0–6.9)
ERYTHROCYTE [DISTWIDTH] IN BLOOD BY AUTOMATED COUNT: 51.7 FL (ref 35.9–50)
GLOBULIN SER CALC-MCNC: 2.7 G/DL (ref 1.9–3.5)
GLUCOSE BLD-MCNC: 113 MG/DL (ref 65–99)
GLUCOSE BLD-MCNC: 114 MG/DL (ref 65–99)
GLUCOSE BLD-MCNC: 79 MG/DL (ref 65–99)
GLUCOSE SERPL-MCNC: 152 MG/DL (ref 65–99)
HCT VFR BLD AUTO: 45.7 % (ref 37–47)
HGB BLD-MCNC: 15.2 G/DL (ref 12–16)
IMM GRANULOCYTES # BLD AUTO: 0.01 K/UL (ref 0–0.11)
IMM GRANULOCYTES NFR BLD AUTO: 0.1 % (ref 0–0.9)
LYMPHOCYTES # BLD AUTO: 2.82 K/UL (ref 1–4.8)
LYMPHOCYTES NFR BLD: 41.8 % (ref 22–41)
MCH RBC QN AUTO: 35.5 PG (ref 27–33)
MCHC RBC AUTO-ENTMCNC: 33.3 G/DL (ref 33.6–35)
MCV RBC AUTO: 106.8 FL (ref 81.4–97.8)
MONOCYTES # BLD AUTO: 0.35 K/UL (ref 0–0.85)
MONOCYTES NFR BLD AUTO: 5.2 % (ref 0–13.4)
NEUTROPHILS # BLD AUTO: 3.45 K/UL (ref 2–7.15)
NEUTROPHILS NFR BLD: 51.2 % (ref 44–72)
NRBC # BLD AUTO: 0 K/UL
NRBC BLD-RTO: 0 /100 WBC
PLATELET # BLD AUTO: 178 K/UL (ref 164–446)
PMV BLD AUTO: 10.4 FL (ref 9–12.9)
POTASSIUM SERPL-SCNC: 4.1 MMOL/L (ref 3.6–5.5)
PROT SERPL-MCNC: 6.5 G/DL (ref 6–8.2)
RBC # BLD AUTO: 4.28 M/UL (ref 4.2–5.4)
SODIUM SERPL-SCNC: 138 MMOL/L (ref 135–145)
WBC # BLD AUTO: 6.8 K/UL (ref 4.8–10.8)

## 2020-05-16 PROCEDURE — 82962 GLUCOSE BLOOD TEST: CPT

## 2020-05-16 PROCEDURE — 700102 HCHG RX REV CODE 250 W/ 637 OVERRIDE(OP): Performed by: HOSPITALIST

## 2020-05-16 PROCEDURE — 75635 CT ANGIO ABDOMINAL ARTERIES: CPT

## 2020-05-16 PROCEDURE — A9270 NON-COVERED ITEM OR SERVICE: HCPCS | Performed by: HOSPITALIST

## 2020-05-16 PROCEDURE — 700117 HCHG RX CONTRAST REV CODE 255: Performed by: HOSPITALIST

## 2020-05-16 PROCEDURE — 85730 THROMBOPLASTIN TIME PARTIAL: CPT

## 2020-05-16 PROCEDURE — 99232 SBSQ HOSP IP/OBS MODERATE 35: CPT | Performed by: HOSPITALIST

## 2020-05-16 PROCEDURE — 700111 HCHG RX REV CODE 636 W/ 250 OVERRIDE (IP): Performed by: HOSPITALIST

## 2020-05-16 PROCEDURE — 770006 HCHG ROOM/CARE - MED/SURG/GYN SEMI*

## 2020-05-16 PROCEDURE — 36415 COLL VENOUS BLD VENIPUNCTURE: CPT

## 2020-05-16 RX ORDER — LEVOTHYROXINE SODIUM 0.05 MG/1
50 TABLET ORAL
Status: DISCONTINUED | OUTPATIENT
Start: 2020-05-17 | End: 2020-05-20 | Stop reason: HOSPADM

## 2020-05-16 RX ORDER — NICOTINE 21 MG/24HR
14 PATCH, TRANSDERMAL 24 HOURS TRANSDERMAL
Status: DISCONTINUED | OUTPATIENT
Start: 2020-05-16 | End: 2020-05-18

## 2020-05-16 RX ADMIN — HEPARIN SODIUM 25000 UNITS: 5000 INJECTION, SOLUTION INTRAVENOUS at 17:31

## 2020-05-16 RX ADMIN — OXYCODONE 5 MG: 5 TABLET ORAL at 20:37

## 2020-05-16 RX ADMIN — HYDROMORPHONE HYDROCHLORIDE 0.25 MG: 1 INJECTION, SOLUTION INTRAMUSCULAR; INTRAVENOUS; SUBCUTANEOUS at 08:10

## 2020-05-16 RX ADMIN — IOHEXOL 100 ML: 350 INJECTION, SOLUTION INTRAVENOUS at 12:48

## 2020-05-16 RX ADMIN — HYDROMORPHONE HYDROCHLORIDE 0.25 MG: 1 INJECTION, SOLUTION INTRAMUSCULAR; INTRAVENOUS; SUBCUTANEOUS at 02:57

## 2020-05-16 ASSESSMENT — ENCOUNTER SYMPTOMS
HEARTBURN: 0
SENSORY CHANGE: 1
SINUS PAIN: 0
BLOOD IN STOOL: 0
ABDOMINAL PAIN: 0
FLANK PAIN: 0
SHORTNESS OF BREATH: 0
NECK PAIN: 0
MYALGIAS: 1
BACK PAIN: 0
FOCAL WEAKNESS: 0
NAUSEA: 0
SPUTUM PRODUCTION: 0
PALPITATIONS: 0
SORE THROAT: 0
NERVOUS/ANXIOUS: 0
VOMITING: 0
BLURRED VISION: 0
FALLS: 0
COUGH: 1
DIARRHEA: 0
DIZZINESS: 0
CHILLS: 0
HEADACHES: 0
CONSTIPATION: 0
WHEEZING: 0
WEAKNESS: 0
FEVER: 0
DOUBLE VISION: 0

## 2020-05-16 ASSESSMENT — FIBROSIS 4 INDEX: FIB4 SCORE: 1.26

## 2020-05-16 NOTE — ASSESSMENT & PLAN NOTE
-Well-controlled on current regimen.  Continue lisinopril as currently ordered (dose was increased from 10 mg to 20 mg during this hospitalization).

## 2020-05-16 NOTE — PROGRESS NOTES
Patient admitted via direct admit from [facility].   AAOx4.   Pt denies pain at this time. No interventions needed, will continue to monitor.   Pt denies N/V, SOB, or chest pain.  Pt is on room air.  PAUL pt is independently ambulatory.  +void, LBM 05/15 PTA.  Skin is intact    Plan of care discussed with patient. Fall precautions in place. Belongings and call light within reach. Educated patient to call for assistance as needed. All needs met at this time.

## 2020-05-16 NOTE — ASSESSMENT & PLAN NOTE
-S/p angioplasty with stent insertion on 5/17/2020.  -Continue Plavix.  -Incision site is uncomplicated.  -We will need follow-up with Dr. Gimenez after hospital discharge.

## 2020-05-16 NOTE — CARE PLAN
Problem: Knowledge Deficit  Goal: Knowledge of disease process/condition, treatment plan, diagnostic tests, and medications will improve  Outcome: PROGRESSING AS EXPECTED     Problem: Venous Thromboembolism (VTW)/Deep Vein Thrombosis (DVT) Prevention:  Goal: Patient will participate in Venous Thrombosis (VTE)/Deep Vein Thrombosis (DVT)Prevention Measures  Outcome: PROGRESSING AS EXPECTED

## 2020-05-16 NOTE — PROGRESS NOTES
[facility] Medicine Daily Progress Note    Date of Service  5/16/2020    Chief Complaint  Left leg pain    Hospital Course    This is a 55 y.o. female with history of chronic lower back pain who presents emergency department with complaints of severe pain reported to be 10 out of 10 in severity of the left foot with some radiation upward but otherwise no incontinence or thigh or saddle anesthesia.     MRI negative for spinal cord compression.  ABIGAIL performed in emergency department showed a diminished flow of 0.5. Vascular surgery was consulted and requested a CTA of the aorta with runoff.  This study had to be performed at [facility].     COVID testing was negative in the ED.    She was started on heparin drip in the emergency department and transferred to [facility].      Interval Problem Update  Today the patient has 7/10 pain after standing and 0/10 lower left extremity pain at rest. She has reduced sensation in her left lower extremity.  She denies headache, nausea and any other pain at this time.  She had a normal bowel movement yesterday.  She states she has been off of her regular diabetes and blood pressure medications and has not been able to follow-up with her primary care provider.  She is having nicotine cravings and requests a nicotine patch.    CTA aorta:   1.  Occlusion of the left common iliac artery just beyond its origin with occlusion of the left internal iliac artery. Collateral reconstitution of the distal left external iliac artery.  2.  Small caliber left common femoral and superficial femoral arteries.    Consultants/Specialty  Vascular surgery    Code Status  Full    Disposition  Likely home when vascular surgery signed off    Review of Systems  Review of Systems   Constitutional: Negative for chills, fever and malaise/fatigue.   HENT: Negative for congestion, sinus pain and sore throat.    Eyes: Negative for blurred vision and double vision.   Respiratory: Positive for cough  (chronic, likely smoking related). Negative for sputum production, shortness of breath and wheezing.    Cardiovascular: Negative for chest pain, palpitations and leg swelling.   Gastrointestinal: Negative for abdominal pain, blood in stool, constipation, diarrhea, heartburn, melena, nausea and vomiting.   Genitourinary: Negative for dysuria, flank pain, frequency, hematuria and urgency.   Musculoskeletal: Positive for myalgias. Negative for back pain, falls, joint pain and neck pain.   Neurological: Positive for sensory change. Negative for dizziness, focal weakness, weakness and headaches.   Psychiatric/Behavioral: The patient is not nervous/anxious.         Physical Exam  Temp:  [36.1 °C (97 °F)-37.1 °C (98.7 °F)] 37.1 °C (98.7 °F)  Pulse:  [59-65] 62  Resp:  [16-19] 16  BP: (105-130)/(59-68) 113/59  SpO2:  [93 %-95 %] 93 %    Physical Exam  Vitals signs and nursing note reviewed.   Constitutional:       General: She is awake.      Appearance: She is overweight. She is ill-appearing.   HENT:      Head: Normocephalic and atraumatic.      Nose: Nose normal. No congestion or rhinorrhea.      Mouth/Throat:      Mouth: Mucous membranes are dry.      Pharynx: Oropharynx is clear.   Eyes:      Conjunctiva/sclera: Conjunctivae normal.      Pupils: Pupils are equal, round, and reactive to light.   Cardiovascular:      Rate and Rhythm: Normal rate and regular rhythm.      Heart sounds: Normal heart sounds.   Pulmonary:      Effort: Pulmonary effort is normal.      Breath sounds: Normal breath sounds.   Abdominal:      General: There is no distension.      Palpations: There is no mass.      Tenderness: There is no abdominal tenderness.   Musculoskeletal:         General: No swelling or tenderness.      Right lower leg: No edema.      Left lower leg: No edema.   Feet:      Comments: Cool lower left extremity  Neurological:      Mental Status: She is alert and oriented to person, place, and time.      Motor: No weakness.    Psychiatric:         Attention and Perception: Attention normal.         Mood and Affect: Mood normal.         Behavior: Behavior normal. Behavior is cooperative.         Fluids    Intake/Output Summary (Last 24 hours) at 5/16/2020 1609  Last data filed at 5/16/2020 0400  Gross per 24 hour   Intake 366 ml   Output --   Net 366 ml       Laboratory  Recent Labs     05/15/20  1415 05/15/20  2353   WBC 9.0 6.8   RBC 4.76 4.28   HEMOGLOBIN 16.9* 15.2   HEMATOCRIT 49.2* 45.7   .4* 106.8*   MCH 35.5* 35.5*   MCHC 34.3 33.3*   RDW 49.6 51.7*   PLATELETCT 208 178   MPV 10.2 10.4     Recent Labs     05/15/20  1415 05/15/20  2353   SODIUM 140 138   POTASSIUM 4.4 4.1   CHLORIDE 106 103   CO2 20 21   GLUCOSE 93 152*   BUN 9 11   CREATININE 0.77 0.69   CALCIUM 9.5 9.1     Recent Labs     05/15/20  1415 05/15/20  2353 05/16/20  0606   APTT 24.9 81.6* 63.0*   INR 0.85*  --   --                 Imaging  CT-CTA AORTA-RO WITH & W/O-POST PROCESS   Final Result      1.  Occlusion of the left common iliac artery just beyond its origin with occlusion of the left internal iliac artery. Collateral reconstitution of the distal left external iliac artery.      2.  Small caliber left common femoral and superficial femoral arteries.      3.  Patent proximal left trifurcation vessels with no demonstrable flow below the calf.      4.  Patent right external and internal iliac arteries and patent right lower extremity arteries.      5.  No thoracic or abdominal aortic aneurysm occlusion.           Assessment/Plan  Type 2 diabetes mellitus with circulatory disorder, without long-term current use of insulin (HCC)  Assessment & Plan  Accu-Cheks  Sliding scale insulin  Hypoglycemia protocol  Continue diabetic diet when no longer n.p.o.  Hemoglobin A1c ordered    Tobacco use disorder  Assessment & Plan  Counseled patient on cessation  Nicotine patch  Follow-up outpatient    Essential hypertension  Assessment & Plan  Continue lisinopril    PVD  (peripheral vascular disease) (HCC)- (present on admission)  Assessment & Plan  diminished flow with ABIGAIL of 0.5.    vascular surgery who recommended a CTA of the aorta with runoff  Continue heparin drip  Plans for surgery tomorrow, 5/17  N.p.o. at midnight  Pain management       VTE prophylaxis: Heparin drip    JAMIE Chavez

## 2020-05-16 NOTE — PROGRESS NOTES
Spoke with admitting physician Dr. Turner Santiago. Received order to make pt NPO as of midnight 05/16/2020.

## 2020-05-16 NOTE — ASSESSMENT & PLAN NOTE
Accu-Cheks  Sliding scale insulin  Hypoglycemia protocol  Glycohemoglobin   Date Value Ref Range Status   05/17/2020 5.6 0.0 - 5.6 % Final     Comment:     Increased risk for diabetes:  5.7 -6.4%  Diabetes:  >6.4%  Glycemic control for adults with diabetes:  <7.0%  The above interpretations are per ADA guidelines.  Diagnosis  of diabetes mellitus on the basis of elevated Hemoglobin A1c  should be confirmed by repeating the Hb A1c test.     resolved

## 2020-05-16 NOTE — ED NOTES
Pt assisted to BR  Ambulated slow and steady  Per pt she is feeling off s/p PO med's that were given earlier  Received good pain control per pt

## 2020-05-16 NOTE — ASSESSMENT & PLAN NOTE
Likely due to peripheral vascular disease  ABIGAIL 0.5 on left lower extremity  CT aorta with runoff pending  Transfer to [facility]  Vascular surgery consulted  Continue heparin drip on transfer

## 2020-05-16 NOTE — PROGRESS NOTES
Transfer Center:    Received ED to Direct Admit transfer request from [facility]  @ [phone]  Sending Physician:  Dr. Merlin Sepulveda   Specialist consulted:  Dr. Cuco Gimenez  Diagnosis:  Claudication   Patient accepted by:  Dr. Merlin Sepulveda     Patient coming via:  [facility]  ETA:  TBD room availability   Nursing to notify Direct Admit On-Call hospitalist and Specialist when patient arrives.     Plan:  Transfer Center to assist if needed.

## 2020-05-16 NOTE — H&P
Hospital Medicine History & Physical Note    Date of Service  5/15/2020    PCP: Pcp Pt States None    CC: Leg pain    HPI:   This is a 55 y.o. female with history of chronic lower back pain who presents emergency department with complaints of severe pain reported to be 10 out of 10 in severity of the left foot with some radiation upward but otherwise no incontinence or thigh or saddle anesthesia.    MRI negative for spinal cord compression.  ABIGAIL performed in emergency department showed a diminished flow with ABIGAIL of 0.5.  In follow-up we spoke with vascular surgery on-call they have been requesting a CTA of the aorta with runoff.  This study had to be performed at [facility].    COVID lab testing was drawn in the ED.  Reviewed patient's labs overall reassuring, patient has no leukocytosis she is slightly hemoconcentrated with a hemoglobin of 16.2.  Sodium 140 and creatinine within normal at 0.7.    She is started on heparin drip in the emergency department with transferring to [facility] sometime this evening.    I discussed this patient's case with Dr Fofana of the emergency department.     Consultants:   Vascular Sx    ROS: As above. The remainder of a complete review of systems is negative in all systems except as noted.    PMHx:   has a past medical history of Back pain and Psychiatric disorder.    PSHx:   has a past surgical history that includes other abdominal surgery.    SHx:   reports that she has been smoking cigarettes. She has been smoking about 0.50 packs per day. She has never used smokeless tobacco. She reports current alcohol use. She reports that she does not use drugs.    FHx:  Reviewed with patient no significant family history    Allergies:  Allergies   Allergen Reactions   • Aspirin Rash and Swelling     Generalized rash, swelling in hands and feet       Medications:  No current facility-administered medications on file prior to encounter.      Current Outpatient Medications on File  Prior to Encounter   Medication Sig Dispense Refill   • lisinopril (PRINIVIL) 10 MG Tab Take 1 Tab by mouth every day. 30 Tab 1   • levothyroxine (SYNTHROID) 25 MCG Tab Take 1 Tab by mouth Every morning on an empty stomach. 30 Tab 1   • metFORMIN (GLUCOPHAGE) 500 MG Tab Take 1 Tab by mouth 2 times a day, with meals. 60 Tab 1       Objective Exam:  Vitals:    05/15/20 1745 05/15/20 1801 05/15/20 1816 05/15/20 1846   BP: 157/68 157/68 132/61 111/55   Pulse: 78  73    Resp:       Temp:       TempSrc:       SpO2: 98%  93%    Weight:       Height:             Physical Exam   Constitutional: No distress.   HENT:   Head: Normocephalic.   Mouth/Throat: Oropharynx is clear and moist.   Eyes: Conjunctivae are normal. No scleral icterus.   Neck: Normal range of motion.   Cardiovascular: Normal heart sounds and intact distal pulses.   Palpable but decreased left dorsalis pedis pulse, foot feels cooler than right   Pulmonary/Chest: Effort normal. No stridor. No respiratory distress. She has no wheezes.   Abdominal: Soft. She exhibits no distension. There is no guarding.   Musculoskeletal:         General: No tenderness, deformity or edema.   Neurological: She is alert.   Skin: Skin is warm and dry. No rash noted. She is not diaphoretic. No erythema.   Psychiatric: Her behavior is normal. Thought content normal.   Nursing note and vitals reviewed.    Laboratory--reviewed personally and are as follows:  Lab Results   Component Value Date/Time    WBC 9.0 05/15/2020 02:15 PM    RBC 4.76 05/15/2020 02:15 PM    HEMOGLOBIN 16.9 (H) 05/15/2020 02:15 PM    HEMATOCRIT 49.2 (H) 05/15/2020 02:15 PM    .4 (H) 05/15/2020 02:15 PM    MCH 35.5 (H) 05/15/2020 02:15 PM    MCHC 34.3 05/15/2020 02:15 PM    MPV 10.2 05/15/2020 02:15 PM    NEUTSPOLYS 61.80 05/15/2020 02:15 PM    LYMPHOCYTES 31.70 05/15/2020 02:15 PM    MONOCYTES 5.20 05/15/2020 02:15 PM    EOSINOPHILS 0.70 05/15/2020 02:15 PM    BASOPHILS 0.30 05/15/2020 02:15 PM      Lab  Results   Component Value Date/Time    SODIUM 140 05/15/2020 02:15 PM    POTASSIUM 4.4 05/15/2020 02:15 PM    CHLORIDE 106 05/15/2020 02:15 PM    CO2 20 05/15/2020 02:15 PM    GLUCOSE 93 05/15/2020 02:15 PM    BUN 9 05/15/2020 02:15 PM    CREATININE 0.77 05/15/2020 02:15 PM      Lab Results   Component Value Date/Time    PROTHROMBTM 11.7 (L) 05/15/2020 02:15 PM    INR 0.85 (L) 05/15/2020 02:15 PM        Radiology  US-ABIGAIL SINGLE LEVEL BILAT   Final Result      US-EXTREMITY ARTERY LOWER UNILAT LEFT   Final Result      MR-LUMBAR SPINE-W/O   Final Result      1.  No significant degenerative disc disease.      2.  No discrete disc herniation or isolated nerve root compression.      3.  Facet arthropathy in the lower lumbar spine.        Assessment/Plan:  * Claudication (HCC)  Assessment & Plan  Likely due to peripheral vascular disease  ABIGAIL 0.5 on left lower extremity  CT aorta with runoff pending  Transfer to [facility]  Vascular surgery consulted  Continue heparin drip on transfer    Elevated hematocrit  Assessment & Plan  Suspect related to hemoconcentration and dehydration  Follow a.m. CBC    PVD (peripheral vascular disease) (HCC)  Assessment & Plan  Left lower extremity ABIGAIL 0.5       VTE prophylaxis: heparin gtt

## 2020-05-16 NOTE — ED NOTES
Med rec complete per phone interview with patient. Allergies reviewed. No ABX taken in last 14 days.

## 2020-05-16 NOTE — ED NOTES
Report off to Gregg VILLANUEVA Still awaiting Rm assignment at [facility]  Pt cleared for PO fluids by Dr. Sepulveda Apple juice given

## 2020-05-16 NOTE — PROGRESS NOTES
PAtient admitted by my colleague at [facility] and transferred to this facility for vascular imaging.  Patient seen and in stable condition.  Continue as per my colleagues A/P.

## 2020-05-16 NOTE — ED NOTES
Pt made aware of POC  She it to be admitted at [facility] for further testing and treatment  COVID swab done and sent to lab  PO and IV med's given per order  VSS  IV med infusing RAC IV stie  New site L hand SL placed

## 2020-05-17 ENCOUNTER — ANESTHESIA EVENT (OUTPATIENT)
Dept: SURGERY | Facility: MEDICAL CENTER | Age: 55
DRG: 254 | End: 2020-05-17
Payer: MEDICAID

## 2020-05-17 ENCOUNTER — APPOINTMENT (OUTPATIENT)
Dept: RADIOLOGY | Facility: MEDICAL CENTER | Age: 55
DRG: 254 | End: 2020-05-17
Attending: SURGERY
Payer: MEDICAID

## 2020-05-17 ENCOUNTER — ANESTHESIA (OUTPATIENT)
Dept: SURGERY | Facility: MEDICAL CENTER | Age: 55
DRG: 254 | End: 2020-05-17
Payer: MEDICAID

## 2020-05-17 PROBLEM — R73.9 ACUTE HYPERGLYCEMIA: Status: ACTIVE | Noted: 2020-05-16

## 2020-05-17 LAB
ACTION RANGE TRIGGERED IACRT: YES
ALBUMIN SERPL BCP-MCNC: 3.8 G/DL (ref 3.2–4.9)
ALBUMIN/GLOB SERPL: 1.4 G/DL
ALP SERPL-CCNC: 87 U/L (ref 30–99)
ALT SERPL-CCNC: 37 U/L (ref 2–50)
ANION GAP SERPL CALC-SCNC: 14 MMOL/L (ref 7–16)
APTT PPP: 64.4 SEC (ref 24.7–36)
AST SERPL-CCNC: 39 U/L (ref 12–45)
BASE EXCESS BLDV CALC-SCNC: -5 MMOL/L (ref -4–3)
BASOPHILS # BLD AUTO: 0.5 % (ref 0–1.8)
BASOPHILS # BLD: 0.03 K/UL (ref 0–0.12)
BILIRUB SERPL-MCNC: 0.7 MG/DL (ref 0.1–1.5)
BODY TEMPERATURE: ABNORMAL DEGREES
BUN SERPL-MCNC: 10 MG/DL (ref 8–22)
CALCIUM SERPL-MCNC: 8.8 MG/DL (ref 8.5–10.5)
CHLORIDE SERPL-SCNC: 103 MMOL/L (ref 96–112)
CO2 BLDV-SCNC: 24 MMOL/L (ref 20–33)
CO2 SERPL-SCNC: 21 MMOL/L (ref 20–33)
CREAT SERPL-MCNC: 0.64 MG/DL (ref 0.5–1.4)
EOSINOPHIL # BLD AUTO: 0.09 K/UL (ref 0–0.51)
EOSINOPHIL NFR BLD: 1.4 % (ref 0–6.9)
ERYTHROCYTE [DISTWIDTH] IN BLOOD BY AUTOMATED COUNT: 51.8 FL (ref 35.9–50)
EST. AVERAGE GLUCOSE BLD GHB EST-MCNC: 114 MG/DL
GLOBULIN SER CALC-MCNC: 2.8 G/DL (ref 1.9–3.5)
GLUCOSE BLD-MCNC: 104 MG/DL (ref 65–99)
GLUCOSE BLD-MCNC: 111 MG/DL (ref 65–99)
GLUCOSE BLD-MCNC: 80 MG/DL (ref 65–99)
GLUCOSE BLD-MCNC: 96 MG/DL (ref 65–99)
GLUCOSE SERPL-MCNC: 105 MG/DL (ref 65–99)
HBA1C MFR BLD: 5.6 % (ref 0–5.6)
HCO3 BLDV-SCNC: 22.2 MMOL/L (ref 24–28)
HCT VFR BLD AUTO: 44.7 % (ref 37–47)
HCT VFR BLD CALC: 43 % (ref 37–47)
HGB BLD-MCNC: 14.6 G/DL (ref 12–16)
HGB BLD-MCNC: 14.7 G/DL (ref 12–16)
IMM GRANULOCYTES # BLD AUTO: 0.01 K/UL (ref 0–0.11)
IMM GRANULOCYTES NFR BLD AUTO: 0.2 % (ref 0–0.9)
INST. QUALIFIED PATIENT IIQPT: YES
LYMPHOCYTES # BLD AUTO: 2.92 K/UL (ref 1–4.8)
LYMPHOCYTES NFR BLD: 45.3 % (ref 22–41)
MAGNESIUM SERPL-MCNC: 1.9 MG/DL (ref 1.5–2.5)
MCH RBC QN AUTO: 35.7 PG (ref 27–33)
MCHC RBC AUTO-ENTMCNC: 32.9 G/DL (ref 33.6–35)
MCV RBC AUTO: 108.5 FL (ref 81.4–97.8)
MONOCYTES # BLD AUTO: 0.41 K/UL (ref 0–0.85)
MONOCYTES NFR BLD AUTO: 6.4 % (ref 0–13.4)
NEUTROPHILS # BLD AUTO: 2.98 K/UL (ref 2–7.15)
NEUTROPHILS NFR BLD: 46.2 % (ref 44–72)
NRBC # BLD AUTO: 0 K/UL
NRBC BLD-RTO: 0 /100 WBC
PCO2 BLDV: 49.7 MMHG (ref 41–51)
PCO2 TEMP ADJ BLDV: 47.8 MMHG (ref 41–51)
PH BLDV: 7.26 [PH] (ref 7.31–7.45)
PH TEMP ADJ BLDV: 7.27 [PH] (ref 7.31–7.45)
PLATELET # BLD AUTO: 173 K/UL (ref 164–446)
PMV BLD AUTO: 10.7 FL (ref 9–12.9)
PO2 BLDV: 117 MMHG (ref 25–40)
PO2 TEMP ADJ BLDV: 112 MMHG (ref 25–40)
POTASSIUM SERPL-SCNC: 4.1 MMOL/L (ref 3.6–5.5)
PROT SERPL-MCNC: 6.6 G/DL (ref 6–8.2)
RBC # BLD AUTO: 4.12 M/UL (ref 4.2–5.4)
SAO2 % BLDV: 98 %
SODIUM BLD-SCNC: 137 MMOL/L (ref 135–145)
SODIUM SERPL-SCNC: 138 MMOL/L (ref 135–145)
SPECIMEN DRAWN FROM PATIENT: ABNORMAL
TROPONIN T SERPL-MCNC: 6 NG/L (ref 6–19)
WBC # BLD AUTO: 6.4 K/UL (ref 4.8–10.8)

## 2020-05-17 PROCEDURE — 85025 COMPLETE CBC W/AUTO DIFF WBC: CPT

## 2020-05-17 PROCEDURE — 700111 HCHG RX REV CODE 636 W/ 250 OVERRIDE (IP): Performed by: ANESTHESIOLOGY

## 2020-05-17 PROCEDURE — 85014 HEMATOCRIT: CPT

## 2020-05-17 PROCEDURE — 501837 HCHG SUTURE CV: Performed by: SURGERY

## 2020-05-17 PROCEDURE — 83036 HEMOGLOBIN GLYCOSYLATED A1C: CPT

## 2020-05-17 PROCEDURE — A9270 NON-COVERED ITEM OR SERVICE: HCPCS | Performed by: ANESTHESIOLOGY

## 2020-05-17 PROCEDURE — 84484 ASSAY OF TROPONIN QUANT: CPT

## 2020-05-17 PROCEDURE — 80053 COMPREHEN METABOLIC PANEL: CPT

## 2020-05-17 PROCEDURE — 700111 HCHG RX REV CODE 636 W/ 250 OVERRIDE (IP)

## 2020-05-17 PROCEDURE — 770006 HCHG ROOM/CARE - MED/SURG/GYN SEMI*

## 2020-05-17 PROCEDURE — 502240 HCHG MISC OR SUPPLY RC 0272: Performed by: SURGERY

## 2020-05-17 PROCEDURE — 700102 HCHG RX REV CODE 250 W/ 637 OVERRIDE(OP)

## 2020-05-17 PROCEDURE — C1757 CATH, THROMBECTOMY/EMBOLECT: HCPCS | Performed by: SURGERY

## 2020-05-17 PROCEDURE — 160038 HCHG SURGERY MINUTES - EA ADDL 1 MIN LEVEL 2: Performed by: SURGERY

## 2020-05-17 PROCEDURE — C1769 GUIDE WIRE: HCPCS | Performed by: SURGERY

## 2020-05-17 PROCEDURE — 700105 HCHG RX REV CODE 258: Performed by: ANESTHESIOLOGY

## 2020-05-17 PROCEDURE — 700117 HCHG RX CONTRAST REV CODE 255: Performed by: SURGERY

## 2020-05-17 PROCEDURE — 82803 BLOOD GASES ANY COMBINATION: CPT

## 2020-05-17 PROCEDURE — 82962 GLUCOSE BLOOD TEST: CPT

## 2020-05-17 PROCEDURE — A9270 NON-COVERED ITEM OR SERVICE: HCPCS | Performed by: HOSPITALIST

## 2020-05-17 PROCEDURE — 047J0DZ DILATION OF LEFT EXTERNAL ILIAC ARTERY WITH INTRALUMINAL DEVICE, OPEN APPROACH: ICD-10-PCS | Performed by: SURGERY

## 2020-05-17 PROCEDURE — B41G1ZZ FLUOROSCOPY OF LEFT LOWER EXTREMITY ARTERIES USING LOW OSMOLAR CONTRAST: ICD-10-PCS | Performed by: SURGERY

## 2020-05-17 PROCEDURE — 83735 ASSAY OF MAGNESIUM: CPT

## 2020-05-17 PROCEDURE — 700102 HCHG RX REV CODE 250 W/ 637 OVERRIDE(OP): Performed by: ANESTHESIOLOGY

## 2020-05-17 PROCEDURE — 160035 HCHG PACU - 1ST 60 MINS PHASE I: Performed by: SURGERY

## 2020-05-17 PROCEDURE — 501838 HCHG SUTURE GENERAL: Performed by: SURGERY

## 2020-05-17 PROCEDURE — 700111 HCHG RX REV CODE 636 W/ 250 OVERRIDE (IP): Performed by: SURGERY

## 2020-05-17 PROCEDURE — C1725 CATH, TRANSLUMIN NON-LASER: HCPCS | Performed by: SURGERY

## 2020-05-17 PROCEDURE — 160002 HCHG RECOVERY MINUTES (STAT): Performed by: SURGERY

## 2020-05-17 PROCEDURE — 160027 HCHG SURGERY MINUTES - 1ST 30 MINS LEVEL 2: Performed by: SURGERY

## 2020-05-17 PROCEDURE — 84132 ASSAY OF SERUM POTASSIUM: CPT

## 2020-05-17 PROCEDURE — C1894 INTRO/SHEATH, NON-LASER: HCPCS | Performed by: SURGERY

## 2020-05-17 PROCEDURE — 160048 HCHG OR STATISTICAL LEVEL 1-5: Performed by: SURGERY

## 2020-05-17 PROCEDURE — 501445 HCHG STAPLER, SKIN DISP: Performed by: SURGERY

## 2020-05-17 PROCEDURE — 500869 HCHG NEEDLE, THINWALL 19GA (COOK): Performed by: SURGERY

## 2020-05-17 PROCEDURE — 700101 HCHG RX REV CODE 250: Performed by: ANESTHESIOLOGY

## 2020-05-17 PROCEDURE — A6404 STERILE GAUZE > 48 SQ IN: HCPCS | Performed by: SURGERY

## 2020-05-17 PROCEDURE — 700102 HCHG RX REV CODE 250 W/ 637 OVERRIDE(OP): Performed by: HOSPITALIST

## 2020-05-17 PROCEDURE — 700102 HCHG RX REV CODE 250 W/ 637 OVERRIDE(OP): Performed by: NURSE PRACTITIONER

## 2020-05-17 PROCEDURE — 160036 HCHG PACU - EA ADDL 30 MINS PHASE I: Performed by: SURGERY

## 2020-05-17 PROCEDURE — 500257: Performed by: SURGERY

## 2020-05-17 PROCEDURE — A9270 NON-COVERED ITEM OR SERVICE: HCPCS | Performed by: NURSE PRACTITIONER

## 2020-05-17 PROCEDURE — 110454 HCHG SHELL REV 250: Performed by: SURGERY

## 2020-05-17 PROCEDURE — 85730 THROMBOPLASTIN TIME PARTIAL: CPT

## 2020-05-17 PROCEDURE — 700111 HCHG RX REV CODE 636 W/ 250 OVERRIDE (IP): Performed by: HOSPITALIST

## 2020-05-17 PROCEDURE — 700101 HCHG RX REV CODE 250: Performed by: SURGERY

## 2020-05-17 PROCEDURE — A9270 NON-COVERED ITEM OR SERVICE: HCPCS

## 2020-05-17 PROCEDURE — 82947 ASSAY GLUCOSE BLOOD QUANT: CPT

## 2020-05-17 PROCEDURE — 84295 ASSAY OF SERUM SODIUM: CPT

## 2020-05-17 PROCEDURE — C1768 GRAFT, VASCULAR: HCPCS | Performed by: SURGERY

## 2020-05-17 PROCEDURE — A6402 STERILE GAUZE <= 16 SQ IN: HCPCS | Performed by: SURGERY

## 2020-05-17 PROCEDURE — 99232 SBSQ HOSP IP/OBS MODERATE 35: CPT | Performed by: HOSPITALIST

## 2020-05-17 PROCEDURE — 160009 HCHG ANES TIME/MIN: Performed by: SURGERY

## 2020-05-17 PROCEDURE — C1876 STENT, NON-COA/NON-COV W/DEL: HCPCS | Performed by: SURGERY

## 2020-05-17 PROCEDURE — 36415 COLL VENOUS BLD VENIPUNCTURE: CPT

## 2020-05-17 PROCEDURE — 82330 ASSAY OF CALCIUM: CPT

## 2020-05-17 PROCEDURE — C1887 CATHETER, GUIDING: HCPCS | Performed by: SURGERY

## 2020-05-17 DEVICE — IMPLANTABLE DEVICE: Type: IMPLANTABLE DEVICE | Status: FUNCTIONAL

## 2020-05-17 RX ORDER — IODIXANOL 270 MG/ML
INJECTION, SOLUTION INTRAVASCULAR
Status: DISCONTINUED | OUTPATIENT
Start: 2020-05-17 | End: 2020-05-17 | Stop reason: HOSPADM

## 2020-05-17 RX ORDER — HEPARIN SODIUM 1000 [USP'U]/ML
INJECTION, SOLUTION INTRAVENOUS; SUBCUTANEOUS PRN
Status: DISCONTINUED | OUTPATIENT
Start: 2020-05-17 | End: 2020-05-18 | Stop reason: HOSPADM

## 2020-05-17 RX ORDER — SODIUM CHLORIDE, SODIUM LACTATE, POTASSIUM CHLORIDE, CALCIUM CHLORIDE 600; 310; 30; 20 MG/100ML; MG/100ML; MG/100ML; MG/100ML
INJECTION, SOLUTION INTRAVENOUS CONTINUOUS
Status: DISCONTINUED | OUTPATIENT
Start: 2020-05-17 | End: 2020-05-17 | Stop reason: HOSPADM

## 2020-05-17 RX ORDER — HEPARIN SODIUM,PORCINE 1000/ML
VIAL (ML) INJECTION
Status: DISCONTINUED | OUTPATIENT
Start: 2020-05-17 | End: 2020-05-17 | Stop reason: HOSPADM

## 2020-05-17 RX ORDER — ONDANSETRON 2 MG/ML
INJECTION INTRAMUSCULAR; INTRAVENOUS PRN
Status: DISCONTINUED | OUTPATIENT
Start: 2020-05-17 | End: 2020-05-17 | Stop reason: SURG

## 2020-05-17 RX ORDER — HALOPERIDOL 5 MG/ML
1 INJECTION INTRAMUSCULAR
Status: DISCONTINUED | OUTPATIENT
Start: 2020-05-17 | End: 2020-05-17 | Stop reason: HOSPADM

## 2020-05-17 RX ORDER — CLOPIDOGREL BISULFATE 75 MG/1
75 TABLET ORAL DAILY
Status: DISCONTINUED | OUTPATIENT
Start: 2020-05-17 | End: 2020-05-20 | Stop reason: HOSPADM

## 2020-05-17 RX ORDER — DIPHENHYDRAMINE HYDROCHLORIDE 50 MG/ML
12.5 INJECTION INTRAMUSCULAR; INTRAVENOUS
Status: DISCONTINUED | OUTPATIENT
Start: 2020-05-17 | End: 2020-05-17 | Stop reason: HOSPADM

## 2020-05-17 RX ORDER — SCOLOPAMINE TRANSDERMAL SYSTEM 1 MG/1
1 PATCH, EXTENDED RELEASE TRANSDERMAL
Status: DISCONTINUED | OUTPATIENT
Start: 2020-05-17 | End: 2020-05-17 | Stop reason: HOSPADM

## 2020-05-17 RX ORDER — MIDAZOLAM HYDROCHLORIDE 1 MG/ML
INJECTION INTRAMUSCULAR; INTRAVENOUS PRN
Status: DISCONTINUED | OUTPATIENT
Start: 2020-05-17 | End: 2020-05-17 | Stop reason: SURG

## 2020-05-17 RX ORDER — BUPIVACAINE HYDROCHLORIDE 5 MG/ML
INJECTION, SOLUTION EPIDURAL; INTRACAUDAL
Status: DISCONTINUED | OUTPATIENT
Start: 2020-05-17 | End: 2020-05-17 | Stop reason: HOSPADM

## 2020-05-17 RX ORDER — DEXAMETHASONE SODIUM PHOSPHATE 4 MG/ML
INJECTION, SOLUTION INTRA-ARTICULAR; INTRALESIONAL; INTRAMUSCULAR; INTRAVENOUS; SOFT TISSUE PRN
Status: DISCONTINUED | OUTPATIENT
Start: 2020-05-17 | End: 2020-05-17 | Stop reason: SURG

## 2020-05-17 RX ORDER — MORPHINE SULFATE 4 MG/ML
1 INJECTION, SOLUTION INTRAMUSCULAR; INTRAVENOUS
Status: DISCONTINUED | OUTPATIENT
Start: 2020-05-17 | End: 2020-05-17 | Stop reason: HOSPADM

## 2020-05-17 RX ORDER — ROCURONIUM BROMIDE 10 MG/ML
INJECTION, SOLUTION INTRAVENOUS PRN
Status: DISCONTINUED | OUTPATIENT
Start: 2020-05-17 | End: 2020-05-17 | Stop reason: SURG

## 2020-05-17 RX ORDER — CEFAZOLIN SODIUM 1 G/3ML
INJECTION, POWDER, FOR SOLUTION INTRAMUSCULAR; INTRAVENOUS PRN
Status: DISCONTINUED | OUTPATIENT
Start: 2020-05-17 | End: 2020-05-17 | Stop reason: SURG

## 2020-05-17 RX ORDER — MORPHINE SULFATE 10 MG/ML
5 INJECTION, SOLUTION INTRAMUSCULAR; INTRAVENOUS
Status: DISCONTINUED | OUTPATIENT
Start: 2020-05-17 | End: 2020-05-17 | Stop reason: HOSPADM

## 2020-05-17 RX ORDER — SODIUM CHLORIDE, SODIUM LACTATE, POTASSIUM CHLORIDE, CALCIUM CHLORIDE 600; 310; 30; 20 MG/100ML; MG/100ML; MG/100ML; MG/100ML
INJECTION, SOLUTION INTRAVENOUS
Status: DISCONTINUED | OUTPATIENT
Start: 2020-05-17 | End: 2020-05-17 | Stop reason: SURG

## 2020-05-17 RX ORDER — MEPERIDINE HYDROCHLORIDE 25 MG/ML
6.25 INJECTION INTRAMUSCULAR; INTRAVENOUS; SUBCUTANEOUS
Status: DISCONTINUED | OUTPATIENT
Start: 2020-05-17 | End: 2020-05-17 | Stop reason: HOSPADM

## 2020-05-17 RX ORDER — MORPHINE SULFATE 4 MG/ML
2 INJECTION, SOLUTION INTRAMUSCULAR; INTRAVENOUS
Status: DISCONTINUED | OUTPATIENT
Start: 2020-05-17 | End: 2020-05-17 | Stop reason: HOSPADM

## 2020-05-17 RX ORDER — ONDANSETRON 2 MG/ML
4 INJECTION INTRAMUSCULAR; INTRAVENOUS
Status: DISCONTINUED | OUTPATIENT
Start: 2020-05-17 | End: 2020-05-17 | Stop reason: HOSPADM

## 2020-05-17 RX ADMIN — FENTANYL CITRATE 50 MCG: 50 INJECTION INTRAMUSCULAR; INTRAVENOUS at 21:38

## 2020-05-17 RX ADMIN — SUGAMMADEX 200 MG: 100 INJECTION, SOLUTION INTRAVENOUS at 21:10

## 2020-05-17 RX ADMIN — FENTANYL CITRATE 50 MCG: 50 INJECTION, SOLUTION INTRAMUSCULAR; INTRAVENOUS at 20:03

## 2020-05-17 RX ADMIN — ONDANSETRON 4 MG: 2 INJECTION INTRAMUSCULAR; INTRAVENOUS at 21:12

## 2020-05-17 RX ADMIN — LISINOPRIL 20 MG: 20 TABLET ORAL at 05:45

## 2020-05-17 RX ADMIN — LEVOTHYROXINE SODIUM 50 MCG: 50 TABLET ORAL at 05:45

## 2020-05-17 RX ADMIN — HEPARIN SODIUM 1050 UNITS/HR: 5000 INJECTION, SOLUTION INTRAVENOUS at 16:00

## 2020-05-17 RX ADMIN — SODIUM CHLORIDE, POTASSIUM CHLORIDE, SODIUM LACTATE AND CALCIUM CHLORIDE: 600; 310; 30; 20 INJECTION, SOLUTION INTRAVENOUS at 21:40

## 2020-05-17 RX ADMIN — MIDAZOLAM HYDROCHLORIDE 2 MG: 1 INJECTION, SOLUTION INTRAMUSCULAR; INTRAVENOUS at 19:51

## 2020-05-17 RX ADMIN — DEXAMETHASONE SODIUM PHOSPHATE 4 MG: 4 INJECTION, SOLUTION INTRA-ARTICULAR; INTRALESIONAL; INTRAMUSCULAR; INTRAVENOUS; SOFT TISSUE at 20:04

## 2020-05-17 RX ADMIN — EPHEDRINE SULFATE 10 MG: 50 INJECTION, SOLUTION INTRAVENOUS at 20:31

## 2020-05-17 RX ADMIN — FENTANYL CITRATE 50 MCG: 50 INJECTION, SOLUTION INTRAMUSCULAR; INTRAVENOUS at 19:57

## 2020-05-17 RX ADMIN — OXYCODONE 5 MG: 5 TABLET ORAL at 12:41

## 2020-05-17 RX ADMIN — FENTANYL CITRATE 100 MCG: 50 INJECTION, SOLUTION INTRAMUSCULAR; INTRAVENOUS at 21:05

## 2020-05-17 RX ADMIN — MORPHINE SULFATE 5 MG: 10 INJECTION INTRAVENOUS at 21:54

## 2020-05-17 RX ADMIN — HEPARIN SODIUM 8000 UNITS: 1000 INJECTION, SOLUTION INTRAVENOUS; SUBCUTANEOUS at 20:25

## 2020-05-17 RX ADMIN — OXYCODONE 5 MG: 5 TABLET ORAL at 02:40

## 2020-05-17 RX ADMIN — NICOTINE 14 MG: 14 PATCH TRANSDERMAL at 05:44

## 2020-05-17 RX ADMIN — DOCUSATE SODIUM 50 MG AND SENNOSIDES 8.6 MG 2 TABLET: 8.6; 5 TABLET, FILM COATED ORAL at 05:45

## 2020-05-17 RX ADMIN — ROCURONIUM BROMIDE 70 MG: 10 INJECTION, SOLUTION INTRAVENOUS at 19:57

## 2020-05-17 RX ADMIN — HYDROCODONE BITARTRATE AND ACETAMINOPHEN 30 ML: 7.5; 325 SOLUTION ORAL at 21:51

## 2020-05-17 RX ADMIN — MORPHINE SULFATE 5 MG: 10 INJECTION INTRAVENOUS at 22:08

## 2020-05-17 RX ADMIN — SODIUM CHLORIDE, POTASSIUM CHLORIDE, SODIUM LACTATE AND CALCIUM CHLORIDE: 600; 310; 30; 20 INJECTION, SOLUTION INTRAVENOUS at 19:45

## 2020-05-17 RX ADMIN — CEFAZOLIN 2 G: 330 INJECTION, POWDER, FOR SOLUTION INTRAMUSCULAR; INTRAVENOUS at 20:05

## 2020-05-17 RX ADMIN — FENTANYL CITRATE 50 MCG: 50 INJECTION INTRAMUSCULAR; INTRAVENOUS at 21:41

## 2020-05-17 RX ADMIN — FENTANYL CITRATE 50 MCG: 50 INJECTION, SOLUTION INTRAMUSCULAR; INTRAVENOUS at 20:18

## 2020-05-17 RX ADMIN — SCOPALAMINE 1 PATCH: 1 PATCH, EXTENDED RELEASE TRANSDERMAL at 19:30

## 2020-05-17 RX ADMIN — Medication 30 ML: at 21:51

## 2020-05-17 RX ADMIN — PROPOFOL 200 MG: 10 INJECTION, EMULSION INTRAVENOUS at 19:57

## 2020-05-17 ASSESSMENT — ENCOUNTER SYMPTOMS
SPUTUM PRODUCTION: 0
BLOOD IN STOOL: 0
BACK PAIN: 0
NECK PAIN: 0
SINUS PAIN: 0
FOCAL WEAKNESS: 0
BLURRED VISION: 0
DOUBLE VISION: 0
DIZZINESS: 0
HEADACHES: 0
FALLS: 0
NERVOUS/ANXIOUS: 0
WHEEZING: 0
MYALGIAS: 1
DIARRHEA: 0
WEAKNESS: 0
FLANK PAIN: 0
SENSORY CHANGE: 1
PALPITATIONS: 0
SORE THROAT: 0
COUGH: 1
NAUSEA: 0
SHORTNESS OF BREATH: 0
HEARTBURN: 0
VOMITING: 0
ABDOMINAL PAIN: 0
CONSTIPATION: 0
CHILLS: 0
FEVER: 0

## 2020-05-17 NOTE — PROGRESS NOTES
Vascular    Left iliac occlusion  OR tomorrow for stenting or possible bypass  Time TBD    Cuco Gimenez MD  [facility] (General and Vascular Surgery)  Cell: [phone] (text or call is fine, if you don't reach me please try my office)  Office: [phone]  __________________________________________________________________  Patient:Reba Deleon EDILBERTO   MRN:[id number]   CSN:[id number]    5/16/2020    6:17 PM

## 2020-05-17 NOTE — CARE PLAN
Problem: Communication  Goal: The ability to communicate needs accurately and effectively will improve  Outcome: PROGRESSING AS EXPECTED     Problem: Venous Thromboembolism (VTW)/Deep Vein Thrombosis (DVT) Prevention:  Goal: Patient will participate in Venous Thrombosis (VTE)/Deep Vein Thrombosis (DVT)Prevention Measures  Outcome: PROGRESSING AS EXPECTED     Problem: Safety  Goal: Will remain free from injury  Outcome: PROGRESSING AS EXPECTED

## 2020-05-17 NOTE — CARE PLAN
Problem: Discharge Barriers/Planning  Goal: Patient's continuum of care needs will be met  Outcome: PROGRESSING AS EXPECTED     Problem: Pain Management  Goal: Pain level will decrease to patient's comfort goal  Outcome: PROGRESSING AS EXPECTED

## 2020-05-17 NOTE — PROGRESS NOTES
[facility] Medicine Daily Progress Note    Date of Service  5/17/2020    Chief Complaint  Left leg pain    Hospital Course    This is a 55 y.o. female with history of chronic lower back pain who presents emergency department with complaints of severe pain reported to be 10 out of 10 in severity of the left foot with some radiation upward but otherwise no incontinence or thigh or saddle anesthesia. She states she has been off of her regular diabetes and blood pressure medications and has not been able to follow-up with her primary care provider.       MRI negative for spinal cord compression.  ABIGAIL performed in emergency department showed a diminished flow of 0.5. Vascular surgery was consulted and requested a CTA of the aorta with runoff.  This study had to be performed at [facility].     COVID testing was negative in the ED.    She was started on heparin drip in the emergency department and transferred to [facility].    CTA aorta:   1.  Occlusion of the left common iliac artery just beyond its origin with occlusion of the left internal iliac artery. Collateral reconstitution of the distal left external iliac artery.  2.  Small caliber left common femoral and superficial femoral arteries.        Interval Problem Update  Today the patient has 6/10 pain after standing and 0/10 lower left extremity pain at rest. She has a 6/10 headache. She has unchanged reduced sensation in her left lower extremity.  She denies headache, nausea and any other pain at this time.   She denies nicotine cravings.    Consultants/Specialty  Vascular surgery    Code Status  Full    Disposition  Likely home when vascular surgery signed off    Review of Systems  Review of Systems   Constitutional: Negative for chills, fever and malaise/fatigue.   HENT: Negative for congestion, sinus pain and sore throat.    Eyes: Negative for blurred vision and double vision.   Respiratory: Positive for cough (chronic, likely smoking related). Negative  for sputum production, shortness of breath and wheezing.    Cardiovascular: Negative for chest pain, palpitations and leg swelling.   Gastrointestinal: Negative for abdominal pain, blood in stool, constipation, diarrhea, heartburn, melena, nausea and vomiting.   Genitourinary: Negative for dysuria, flank pain, frequency, hematuria and urgency.   Musculoskeletal: Positive for myalgias. Negative for back pain, falls, joint pain and neck pain.   Neurological: Positive for sensory change. Negative for dizziness, focal weakness, weakness and headaches.   Psychiatric/Behavioral: The patient is not nervous/anxious.         Physical Exam  Temp:  [36.1 °C (97 °F)-36.6 °C (97.9 °F)] 36.6 °C (97.8 °F)  Pulse:  [60-67] 61  Resp:  [16-18] 16  BP: (109-148)/(53-77) 148/77  SpO2:  [92 %-97 %] 97 %    Physical Exam  Vitals signs and nursing note reviewed.   Constitutional:       General: She is awake.      Appearance: She is overweight. She is ill-appearing.   HENT:      Head: Normocephalic and atraumatic.      Nose: Nose normal. No congestion or rhinorrhea.      Mouth/Throat:      Mouth: Mucous membranes are dry.      Pharynx: Oropharynx is clear.   Eyes:      Conjunctiva/sclera: Conjunctivae normal.      Pupils: Pupils are equal, round, and reactive to light.   Cardiovascular:      Rate and Rhythm: Normal rate and regular rhythm.      Heart sounds: Normal heart sounds.   Pulmonary:      Effort: Pulmonary effort is normal.      Breath sounds: Normal breath sounds.   Abdominal:      General: There is no distension.      Palpations: There is no mass.      Tenderness: There is no abdominal tenderness.   Musculoskeletal:         General: No swelling or tenderness.      Right lower leg: No edema.      Left lower leg: No edema.   Feet:      Comments: Cool lower left extremity  Neurological:      Mental Status: She is alert and oriented to person, place, and time.      Motor: No weakness.   Psychiatric:         Attention and Perception:  Attention normal.         Mood and Affect: Mood normal.         Behavior: Behavior normal. Behavior is cooperative.     All systems reviewed and exam is unchanged from yesterday.      Fluids    Intake/Output Summary (Last 24 hours) at 5/17/2020 1646  Last data filed at 5/17/2020 1353  Gross per 24 hour   Intake 1404 ml   Output 0 ml   Net 1404 ml       Laboratory  Recent Labs     05/15/20  1415 05/15/20  2353   WBC 9.0 6.8   RBC 4.76 4.28   HEMOGLOBIN 16.9* 15.2   HEMATOCRIT 49.2* 45.7   .4* 106.8*   MCH 35.5* 35.5*   MCHC 34.3 33.3*   RDW 49.6 51.7*   PLATELETCT 208 178   MPV 10.2 10.4     Recent Labs     05/15/20  1415 05/15/20  2353   SODIUM 140 138   POTASSIUM 4.4 4.1   CHLORIDE 106 103   CO2 20 21   GLUCOSE 93 152*   BUN 9 11   CREATININE 0.77 0.69   CALCIUM 9.5 9.1     Recent Labs     05/15/20  1415 05/15/20  2353 05/16/20  0606 05/17/20  0057   APTT 24.9 81.6* 63.0* 64.4*   INR 0.85*  --   --   --                 Imaging  CT-CTA AORTA-RO WITH & W/O-POST PROCESS   Final Result      1.  Occlusion of the left common iliac artery just beyond its origin with occlusion of the left internal iliac artery. Collateral reconstitution of the distal left external iliac artery.      2.  Small caliber left common femoral and superficial femoral arteries.      3.  Patent proximal left trifurcation vessels with no demonstrable flow below the calf.      4.  Patent right external and internal iliac arteries and patent right lower extremity arteries.      5.  No thoracic or abdominal aortic aneurysm occlusion.           Assessment/Plan  Acute hyperglycemia  Assessment & Plan  Accu-Cheks  Sliding scale insulin  Hypoglycemia protocol  Glycohemoglobin   Date Value Ref Range Status   05/17/2020 5.6 0.0 - 5.6 % Final     Comment:     Increased risk for diabetes:  5.7 -6.4%  Diabetes:  >6.4%  Glycemic control for adults with diabetes:  <7.0%  The above interpretations are per ADA guidelines.  Diagnosis  of diabetes mellitus on  the basis of elevated Hemoglobin A1c  should be confirmed by repeating the Hb A1c test.     resolved      Hypothyroid  Overview  Lab Results   Component Value Date/Time    TSHULTRASEN 5.420 (H) 05/13/2020 1140     Start levothyroxine 50 mcg daily  Follow-up outpatient    Essential hypertension  Assessment & Plan  Continue lisinopril  Well managed on current regimen    PVD (peripheral vascular disease) (HCC)- (present on admission)  Assessment & Plan  diminished flow with ABIGAIL of 0.5.    vascular surgery recommended a CTA of the aorta with runoff: Left iliac occlusion  surgery today  Continue heparin drip, stop before surgery  N.p.o.  Pain management    Tobacco use disorder  Assessment & Plan  Counseled patient on cessation  Nicotine patch  Follow-up outpatient       VTE prophylaxis: Heparin drip    JAMIE Chavez

## 2020-05-17 NOTE — PROGRESS NOTES
Report received, assessment complete  AAOx4.   Pt reports pain 7/10. Medicated per MAR.   Pt denies N/V, SOB, or chest pain.  Pt is on room air.  PAUL pt is independently ambulatory.  +void, LBM 05/15 PTA.  Skin is intact     Plan of care discussed with patient. Fall precautions in place. Belongings and call light within reach. Educated patient to call for assistance as needed. All needs met at this time.

## 2020-05-17 NOTE — PROGRESS NOTES
Spoke with Dr. Gimenez regarding heparin drip and surgery today. Per Dr Gimenez, hold heparin when patient leaves the floor to go to surgery.

## 2020-05-17 NOTE — CONSULTS
Vascular Surgery Consult Note  -------------------------------------------------------------------------------------------------  Date: 5/16/2020    Consulting Physician: Cuco Gimenez M.D. [facility]  -------------------------------------------------------------------------------------------------    Reason for consultation:  Acute left lower extremity pain    HPI:  This is a 55 y.o. female who is presenting with acute onset of left lower extremity pain.  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  There is plaque in the artery and no evidence of an embolic source therefore this is most likely an in situ thrombosis.  She has never had any previous symptoms like this and no history of any vascular procedures.  When I came to see her she was alert and conversant in mild distress from her left leg pain.    Past Medical History:   Diagnosis Date   • Arthritis    • Back pain    • Hypertension        Past Surgical History:   Procedure Laterality Date   • OTHER ABDOMINAL SURGERY      gallbladder removed       Current Facility-Administered Medications   Medication Dose Route Frequency Provider Last Rate Last Dose   • nicotine (NICODERM) 14 MG/24HR 14 mg  14 mg Transdermal Daily-0600 JAMIE Chavez   Stopped at 05/16/20 1630   • [START ON 5/17/2020] levothyroxine (SYNTHROID) tablet 50 mcg  50 mcg Oral AM ES JAMIE Chavez       • heparin injection 5,000 Units  5,000 Units Intravenous Once Merlin Sepulveda M.D.   Stopped at 05/15/20 2200    And   • heparin injection 2,600 Units  2,600 Units Intravenous PRN Merlin Sepulveda M.D.        And   • heparin infusion 25,000 units in 500 mL 0.45% NACL   Intravenous Continuous Merlin Sepulveda M.D. 21 mL/hr at 05/16/20 0702 25,000 Units at 05/16/20 1731   • [START ON 5/17/2020] lisinopril (PRINIVIL) tablet 20 mg  20 mg Oral DAILY Merlin Sepulveda M.D.       • insulin regular (HUMULIN R) injection 1-6 Units  1-6 Units  Subcutaneous 4X/DAY JUAN Sepulveda M.D.   Stopped at 05/16/20 0700    And   • glucose 4 g chewable tablet 16 g  16 g Oral Q15 MIN PRN Merlin Sepulveda M.D.        And   • dextrose 50% (D50W) injection 50 mL  50 mL Intravenous Q15 MIN PRMICAH Sepulveda M.D.       • ondansetron (ZOFRAN ODT) dispertab 4 mg  4 mg Oral Q4HRS PRMICAH Sepulveda M.D.       • ondansetron (ZOFRAN) syringe/vial injection 4 mg  4 mg Intravenous Q4HRS PRMICAH Sepulveda M.D.       • prochlorperazine (COMPAZINE) injection 5-10 mg  5-10 mg Intravenous Q4HRS PRMICAH Sepulveda M.D.       • promethazine (PHENERGAN) suppository 12.5-25 mg  12.5-25 mg Rectal Q4HRS PRMICAH Sepulveda M.D.       • promethazine (PHENERGAN) tablet 12.5-25 mg  12.5-25 mg Oral Q4HRS PRMICAH Sepulveda M.D.       • senna-docusate (PERICOLACE or SENOKOT S) 8.6-50 MG per tablet 2 Tab  2 Tab Oral BID Merlin Sepulveda M.D.   Stopped at 05/16/20 0600    And   • polyethylene glycol/lytes (MIRALAX) PACKET 1 Packet  1 Packet Oral QDAY PRN Merlin Sepulveda M.D.        And   • magnesium hydroxide (MILK OF MAGNESIA) suspension 30 mL  30 mL Oral QDAY PRN Merlin Sepulveda M.D.        And   • bisacodyl (DULCOLAX) suppository 10 mg  10 mg Rectal QDAY PRN Merlin Sepulveda M.D.       • acetaminophen (TYLENOL) tablet 650 mg  650 mg Oral Q6HRS PRMICAH Sepulveda M.D.       • Pharmacy Consult Request ...Pain Management Review 1 Each  1 Each Other PHARMACY TO DOSE Merlin Sepulveda M.D.        And   • oxyCODONE immediate-release (ROXICODONE) tablet 2.5 mg  2.5 mg Oral Q3HRS PRN Merlin Sepulveda M.D.        And   • oxyCODONE immediate-release (ROXICODONE) tablet 5 mg  5 mg Oral Q3HRS PRN Merlin Sepulveda M.D.        And   • HYDROmorphone pf (DILAUDID) injection 0.25 mg  0.25 mg Intravenous Q3HRS PRN Merlin Sepulveda M.D.   0.25 mg at 05/16/20 0810       Social History     Socioeconomic History   • Marital status:      Spouse name: Not on file   • Number of children:  "Not on file   • Years of education: Not on file   • Highest education level: Not on file   Occupational History   • Not on file   Social Needs   • Financial resource strain: Not on file   • Food insecurity     Worry: Never true     Inability: Never true   • Transportation needs     Medical: No     Non-medical: No   Tobacco Use   • Smoking status: Current Every Day Smoker     Packs/day: 0.50     Types: Cigarettes   • Smokeless tobacco: Never Used   Substance and Sexual Activity   • Alcohol use: Yes     Alcohol/week: 2.4 oz     Types: 4 Cans of beer per week     Frequency: 2-3 times a week     Drinks per session: 3 or 4     Binge frequency: Less than monthly     Comment: occ   • Drug use: No   • Sexual activity: Not on file   Lifestyle   • Physical activity     Days per week: Not on file     Minutes per session: Not on file   • Stress: Not on file   Relationships   • Social connections     Talks on phone: Not on file     Gets together: Not on file     Attends Restoration service: Not on file     Active member of club or organization: Not on file     Attends meetings of clubs or organizations: Not on file     Relationship status: Not on file   • Intimate partner violence     Fear of current or ex partner: Not on file     Emotionally abused: Not on file     Physically abused: Not on file     Forced sexual activity: Not on file   Other Topics Concern   • Not on file   Social History Narrative   • Not on file       History reviewed. No pertinent family history.    Allergies:  Aspirin    Review of Systems:  Noncontributory except as per HPI    Physical Exam:  /57   Pulse 61   Temp 36.3 °C (97.4 °F) (Temporal)   Resp 16   Ht 1.626 m (5' 4.02\")   Wt 79.5 kg (175 lb 4.3 oz)   SpO2 92%     Constitutional: Alert, oriented, no acute distress  HEENT:  Normocephalic and atraumatic, EOMI  Neck:   Supple, no JVD,   Cardiovascular: Regular rate and rhythm,   Pulmonary:  Good air entry bilaterally,    Abdominal:  Soft, " non-tender, non-distended     Aortic impulse not widened  Musculoskeletal: No edema, no tenderness  Neurological:  CN II-XII grossly intact, no focal deficits  Skin:   Skin is warm and dry. No rash noted.  Psychiatric:  Normal mood and affect.  Vascular: Nonpalpable left pedal pulses    Labs:  Recent Labs     05/15/20  1415 05/15/20  2353   WBC 9.0 6.8   RBC 4.76 4.28   HEMOGLOBIN 16.9* 15.2   HEMATOCRIT 49.2* 45.7   .4* 106.8*   MCH 35.5* 35.5*   MCHC 34.3 33.3*   RDW 49.6 51.7*   PLATELETCT 208 178   MPV 10.2 10.4     Recent Labs     05/15/20  1415 05/15/20  2353   SODIUM 140 138   POTASSIUM 4.4 4.1   CHLORIDE 106 103   CO2 20 21   GLUCOSE 93 152*   BUN 9 11   CREATININE 0.77 0.69   CALCIUM 9.5 9.1     Recent Labs     05/15/20  1415 05/15/20  2353 05/16/20  0606   APTT 24.9 81.6* 63.0*   INR 0.85*  --   --      Recent Labs     05/15/20  1415 05/15/20  2353   ASTSGOT 18 18   ALTSGPT 17 18   TBILIRUBIN 0.4 0.5   ALKPHOSPHAT 112* 89   GLOBULIN 3.0 2.7   INR 0.85*  --        Radiology:  Noninvasive arterial studies show a left ABIGAIL of 0.47.  The right ABIGAIL is normal at 1.1    Duplex shows:   No flow observed in the left common iliac & left external iliac arteries    which appear to be occluded.    Distal waveforms with low resistance pattern, increased spectral    broadening, and parvus upstroke.    Collateral arteries observed filling into the proximal common femoral    artery.   Three vessel runoff in the calf with very low velocities throughout these    arteries.    CTA shows:  1.  Occlusion of the left common iliac artery just beyond its origin with occlusion of the left internal iliac artery. Collateral reconstitution of the distal left external iliac artery.     2.  Small caliber left common femoral and superficial femoral arteries.     3.  Patent proximal left trifurcation vessels with no demonstrable flow below the calf.     4.  Patent right external and internal iliac arteries and patent right lower  extremity arteries.     5.  No thoracic or abdominal aortic aneurysm occlusion.    Assessment/Plan:  -Left lower extremity pain and acute ischemia from left iliac occlusion.  -Hypertension    Left leg pain with evidence of a left iliac artery occlusion.  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  Nevertheless since this appears to be an acute occlusion of the iliac artery it may be amenable to thrombectomy if it is addressed in the acute setting.    Plan will be to go to the operating room for a left femoral cutdown and then a thrombectomy of the left iliac artery and we will stent what ever residual atherosclerotic disease remains in the iliac artery after the thrombectomy.      Cuco Gimenez MD  [facility] (General and Vascular Surgery)  Cell: [phone] (text or call is fine, if you don't reach me please try my office)  Office: [phone]    5/16/2020    6:18 PM  ___________________________________________________________________  Patient:Reba MARTINEZNGELO   MRN:[id number]   CSN:[id number]

## 2020-05-18 LAB
CA-I BLD ISE-SCNC: 1.25 MMOL/L (ref 1.1–1.3)
GLUCOSE BLD-MCNC: 104 MG/DL (ref 65–99)
GLUCOSE BLD-MCNC: 104 MG/DL (ref 65–99)
GLUCOSE BLD-MCNC: 111 MG/DL (ref 65–99)
GLUCOSE BLD-MCNC: 122 MG/DL (ref 65–99)
GLUCOSE BLD-MCNC: 96 MG/DL (ref 65–99)
POTASSIUM BLD-SCNC: 4.2 MMOL/L (ref 3.6–5.5)

## 2020-05-18 PROCEDURE — 700102 HCHG RX REV CODE 250 W/ 637 OVERRIDE(OP): Performed by: NURSE PRACTITIONER

## 2020-05-18 PROCEDURE — 97161 PT EVAL LOW COMPLEX 20 MIN: CPT

## 2020-05-18 PROCEDURE — 770006 HCHG ROOM/CARE - MED/SURG/GYN SEMI*

## 2020-05-18 PROCEDURE — 99406 BEHAV CHNG SMOKING 3-10 MIN: CPT | Performed by: HOSPITALIST

## 2020-05-18 PROCEDURE — 700102 HCHG RX REV CODE 250 W/ 637 OVERRIDE(OP): Performed by: HOSPITALIST

## 2020-05-18 PROCEDURE — 700102 HCHG RX REV CODE 250 W/ 637 OVERRIDE(OP): Performed by: SURGERY

## 2020-05-18 PROCEDURE — A9270 NON-COVERED ITEM OR SERVICE: HCPCS | Performed by: SURGERY

## 2020-05-18 PROCEDURE — 99232 SBSQ HOSP IP/OBS MODERATE 35: CPT | Mod: 25 | Performed by: HOSPITALIST

## 2020-05-18 PROCEDURE — 700111 HCHG RX REV CODE 636 W/ 250 OVERRIDE (IP): Performed by: HOSPITALIST

## 2020-05-18 PROCEDURE — 700111 HCHG RX REV CODE 636 W/ 250 OVERRIDE (IP): Performed by: SURGERY

## 2020-05-18 PROCEDURE — 97166 OT EVAL MOD COMPLEX 45 MIN: CPT

## 2020-05-18 PROCEDURE — A9270 NON-COVERED ITEM OR SERVICE: HCPCS | Performed by: HOSPITALIST

## 2020-05-18 PROCEDURE — A9270 NON-COVERED ITEM OR SERVICE: HCPCS | Performed by: NURSE PRACTITIONER

## 2020-05-18 PROCEDURE — 82962 GLUCOSE BLOOD TEST: CPT | Mod: 91

## 2020-05-18 RX ORDER — NICOTINE 21 MG/24HR
21 PATCH, TRANSDERMAL 24 HOURS TRANSDERMAL DAILY
Status: DISCONTINUED | OUTPATIENT
Start: 2020-05-18 | End: 2020-05-20 | Stop reason: HOSPADM

## 2020-05-18 RX ORDER — NICOTINE 21 MG/24HR
21 PATCH, TRANSDERMAL 24 HOURS TRANSDERMAL
Status: DISCONTINUED | OUTPATIENT
Start: 2020-05-18 | End: 2020-05-18

## 2020-05-18 RX ADMIN — ENOXAPARIN SODIUM 40 MG: 100 INJECTION SUBCUTANEOUS at 00:10

## 2020-05-18 RX ADMIN — OXYCODONE 5 MG: 5 TABLET ORAL at 19:03

## 2020-05-18 RX ADMIN — OXYCODONE 5 MG: 5 TABLET ORAL at 00:10

## 2020-05-18 RX ADMIN — OXYCODONE 5 MG: 5 TABLET ORAL at 15:54

## 2020-05-18 RX ADMIN — DOCUSATE SODIUM 50 MG AND SENNOSIDES 8.6 MG 2 TABLET: 8.6; 5 TABLET, FILM COATED ORAL at 06:04

## 2020-05-18 RX ADMIN — LEVOTHYROXINE SODIUM 50 MCG: 50 TABLET ORAL at 06:04

## 2020-05-18 RX ADMIN — NICOTINE 14 MG: 14 PATCH TRANSDERMAL at 06:04

## 2020-05-18 RX ADMIN — OXYCODONE 5 MG: 5 TABLET ORAL at 22:06

## 2020-05-18 RX ADMIN — OXYCODONE 5 MG: 5 TABLET ORAL at 11:00

## 2020-05-18 RX ADMIN — ENOXAPARIN SODIUM 40 MG: 100 INJECTION SUBCUTANEOUS at 18:12

## 2020-05-18 RX ADMIN — DOCUSATE SODIUM 50 MG AND SENNOSIDES 8.6 MG 2 TABLET: 8.6; 5 TABLET, FILM COATED ORAL at 18:12

## 2020-05-18 RX ADMIN — OXYCODONE 5 MG: 5 TABLET ORAL at 03:37

## 2020-05-18 RX ADMIN — CLOPIDOGREL BISULFATE 75 MG: 75 TABLET ORAL at 00:10

## 2020-05-18 RX ADMIN — OXYCODONE 5 MG: 5 TABLET ORAL at 07:41

## 2020-05-18 RX ADMIN — HYDROMORPHONE HYDROCHLORIDE 0.25 MG: 1 INJECTION, SOLUTION INTRAMUSCULAR; INTRAVENOUS; SUBCUTANEOUS at 12:55

## 2020-05-18 RX ADMIN — NICOTINE TRANSDERMAL SYSTEM 21 MG: 21 PATCH, EXTENDED RELEASE TRANSDERMAL at 18:12

## 2020-05-18 ASSESSMENT — ENCOUNTER SYMPTOMS
SPUTUM PRODUCTION: 0
FLANK PAIN: 0
NERVOUS/ANXIOUS: 0
DIZZINESS: 0
BLOOD IN STOOL: 0
HEARTBURN: 0
NAUSEA: 0
ABDOMINAL PAIN: 0
WEAKNESS: 0
CONSTIPATION: 0
NECK PAIN: 0
FEVER: 0
SORE THROAT: 0
FALLS: 0
COUGH: 0
MYALGIAS: 1
SINUS PAIN: 0
PALPITATIONS: 0
FOCAL WEAKNESS: 0
DIARRHEA: 0
VOMITING: 0
CHILLS: 0
WHEEZING: 0
HEADACHES: 0
BLURRED VISION: 0
DOUBLE VISION: 0
BACK PAIN: 0
SHORTNESS OF BREATH: 0

## 2020-05-18 ASSESSMENT — COGNITIVE AND FUNCTIONAL STATUS - GENERAL
CLIMB 3 TO 5 STEPS WITH RAILING: A LOT
DAILY ACTIVITIY SCORE: 18
MOVING TO AND FROM BED TO CHAIR: A LITTLE
TOILETING: A LITTLE
TURNING FROM BACK TO SIDE WHILE IN FLAT BAD: A LITTLE
MOVING FROM LYING ON BACK TO SITTING ON SIDE OF FLAT BED: A LITTLE
SUGGESTED CMS G CODE MODIFIER DAILY ACTIVITY: CK
MOBILITY SCORE: 17
SUGGESTED CMS G CODE MODIFIER MOBILITY: CK
WALKING IN HOSPITAL ROOM: A LITTLE
HELP NEEDED FOR BATHING: A LOT
STANDING UP FROM CHAIR USING ARMS: A LITTLE
DRESSING REGULAR LOWER BODY CLOTHING: A LOT
DRESSING REGULAR UPPER BODY CLOTHING: A LITTLE

## 2020-05-18 ASSESSMENT — GAIT ASSESSMENTS
GAIT LEVEL OF ASSIST: SUPERVISED
ASSISTIVE DEVICE: FRONT WHEEL WALKER
DEVIATION: STEP TO;DECREASED HEEL STRIKE;DECREASED TOE OFF
DISTANCE (FEET): 30

## 2020-05-18 ASSESSMENT — ACTIVITIES OF DAILY LIVING (ADL): TOILETING: INDEPENDENT

## 2020-05-18 NOTE — ANESTHESIA PROCEDURE NOTES
Airway    Date/Time: 5/17/2020 7:59 PM  Performed by: Ilda Beatty M.D.  Authorized by: Ilda Beatty M.D.     Location:  OR  Urgency:  Elective  Difficult Airway: No    Indications for Airway Management:  Anesthesia      Spontaneous Ventilation: absent    Sedation Level:  Deep  Preoxygenated: Yes    Patient Position:  Sniffing  Mask Difficulty Assessment:  0 - not attempted  Final Airway Type:  Endotracheal airway  Final Endotracheal Airway:  ETT  Cuffed: Yes    Technique Used for Successful ETT Placement:  Direct laryngoscopy  Devices/Methods Used in Placement:  Intubating stylet    Insertion Site:  Oral  Blade Type:  Ashley  Laryngoscope Blade/Videolaryngoscope Blade Size:  3  ETT Size (mm):  7.0  Measured from:  Teeth  ETT to Teeth (cm):  21  Placement Verified by: auscultation and capnometry    Cormack-Lehane Classification:  Grade I - full view of glottis  Number of Attempts at Approach:  1

## 2020-05-18 NOTE — PROGRESS NOTES
Patient is A&Ox4.   Reports pain/burning sensation to left groin, medicated per SOFIA MILLER, CMS intact, denies numbness and tingling.   (L) pedal pulse heard via doppler.   Pt mobilizes with SBA. Educated to call for assistance.   On RA, denies SOB or chest pain   Normoactive BS x 4. Tolerating regular diabetic diet. Denies N&V.   + flatus, - BM at this time.   + void.   (L) groin incision with dermabond, MAGGIE, approximated.   PIV SL   Updated on POC. Belongings and call light within reach. All needs met at this time.

## 2020-05-18 NOTE — OR SURGEON
Immediate Post OP Note    PreOp Diagnosis: LLE ischemia from acute L iliac artery occlusion    PostOp Diagnosis: LLE ischemia from acute L iliac artery occlusion    Procedure(s):  ANGIOPLASTY, ARTERY, ILIAC, WITH STENT INSERTION    Surgeon(s):  Cuco Gimenez M.D.    Anesthesiologist/Type of Anesthesia:  Anesthesiologist: Ilda Beatty M.D./* No anesthesia type entered *    Surgical Staff:  Assistant: VILLA Nolasco  Circulator: Renata Dior R.N.  Scrub Person: [facility]  Radiology Technologist: Lars Elder III; Anupam Tang; Neto Mercedes    Specimens removed if any:  * No specimens in log *    Estimated Blood Loss: 10    Findings: stenosis of the proximal external iliac artery    Complications: intraoperative V-tach for about 1 minute but then stabilized spontaneously.         5/17/2020 9:25 PM Cuco Gimenez M.D.

## 2020-05-18 NOTE — OR NURSING
Pt A&O. VSS on 3 L O2 via NC. No ectopy noted in PACU.    Surgical incision CDI. Bilateral pedal pulses +2. Pt moving legs easily and denies numbness or tingling.     On arrival to PACU pt reported pain level at 10/10. Pt states pain is now tolerable, following interventions. See MAR for details. Pt denies nausea.     Arterial line and clements catheter removed per orders.     Pt's daughter called, however there was no answer. Message left on answering machine with PACU phone number. Update was provided to pt's sister per request.     Report called to RICH Tineo. Waiting for transport.

## 2020-05-18 NOTE — THERAPY
"Physical Therapy   Initial Evaluation     Patient Name: Reba CASANOVA  Age:  55 y.o., Sex:  female  Medical Record #: [id number]  Today's Date: 5/18/2020     Precautions  Precautions: Fall Risk    Subjective    Pt agreeable to PT. Pt c/o L inguinal pain. \"I'll see what I can do.\"      Objective       05/18/20 0825   Prior Living Situation   Prior Services Home-Independent   Housing / Facility 1 Story House   Steps Into Home 10   Steps In Home 0   Rail Right Rail (Steps into Home)   Elevator No   Equipment Owned None   Lives with - Patient's Self Care Capacity Unrelated Adult   Comments Pt lives with roommate  who is not able to assist her.   Prior Level of Functional Mobility   Bed Mobility Independent   Transfer Status Independent   Ambulation Independent   Distance Ambulation (Feet) 300   Assistive Devices Used None   Stairs Independent   Comments Pt reports her mattress is on the floor, tough to get out of   Cognition    Cognition / Consciousness WDL   Comments cooperative   Passive ROM Lower Body   Passive ROM Lower Body X   Comments L LE limited t/o by L inguinal pain   Strength Lower Body   Lower Body Strength  X   Comments L LE grossly 3-/5 t/o due to inguinal pain, R LE WFLs   Sensation Lower Body   Lower Extremity Sensation   WDL   Lower Body Muscle Tone   Lower Body Muscle Tone  WDL   Balance Assessment   Standing Balance (Static) Fair +   Standing Balance (Dynamic) Fair   Weight Shift Standing Fair   Comments with FWW   Gait Analysis   Gait Level Of Assist Supervised   Assistive Device Front Wheel Walker   Distance (Feet) 30   # of Times Distance was Traveled 1   Deviation Step To;Decreased Heel Strike;Decreased Toe Off   Skilled Intervention Compensatory Strategies;Sequencing;Verbal Cuing   Bed Mobility    Supine to Sit Minimal Assist  (for L LE)   Scooting Supervised   Rolling Minimal Assist to Rt.   Skilled Intervention Compensatory Strategies;Verbal Cuing;Sequencing  (log roll)   Functional " Mobility   Sit to Stand Minimal Assist   Bed, Chair, Wheelchair Transfer Minimal Assist   Transfer Method Stand Pivot   Mobility gait in room, up to chair   Skilled Intervention Sequencing;Verbal Cuing;Compensatory Strategies   Patient / Family Goals    Patient / Family Goal #1 home   Short Term Goals    Short Term Goal # 1 Pt will be SPV for supine<>sit in 6 txs to improve functional mobility.   Short Term Goal # 2 Pt will be [place] for gait x 150' with FWW in 6 txs to improve functional mobility.   Short Term Goal # 3 Pt will be SPV for up/down 10 steps with rail in 6 txs to be able to access her home.   Problem List    Problems Pain;Impaired Bed Mobility;Impaired Transfers;Impaired Ambulation;Functional ROM Deficit;Functional Strength Deficit;Impaired Balance;Decreased Activity Tolerance;Limited Knowledge of Post-Op Precautions   Anticipated Discharge Equipment   DC Equipment Front-Wheel Walker       Assessment  Patient is 55 y.o. female s/p L iliac stent placement on 5/17/20. Pt lives with a roommate in a H with 10-step entry. Pt reports her mattress at home site on the floor and that she does not have a recliner to sleep in. Pt was fully IND with mobility PTA. Pt presents today with moderate L inguinal pain that limits her functional mobility. She requires Scot for supine>sit and STS. Pt ambulated x 30' with FWW with cues for step-to pattern to off-load L LE.  Discussed need for pt to have a friend purchase her a bed frame prior to discharge so that she can safely get in/oob bed. PT will follow pt during acute stay.  Anticipate pt will be able to go home, but must have standard height bedframe to make this option work, otherwise will need CG assist.    Plan    Recommend Physical Therapy 3 times per week until therapy goals are met for the following treatments:  Bed Mobility, Equipment, Gait Training, Neuro Re-Education / Balance, Self Care/Home Evaluation, Stair Training, Therapeutic Activities and Therapeutic  Exercises    Discharge recommendations:  Anticipate that the patient will have no further physical therapy needs after discharge from the hospital.

## 2020-05-18 NOTE — ANESTHESIA QCDR
2019 [facility] Clinical Data Registry (for Quality Improvement)     Postoperative nausea/vomiting risk protocol (Adult = 18 yrs and Pediatric 3-17 yrs)- (430 and 463)  General inhalation anesthetic (NOT TIVA) with PONV risk factors: Yes  Provision of anti-emetic therapy with at least 2 different classes of agents: Yes   Patient DID NOT receive anti-emetic therapy and reason is documented in Medical Record:  N/A    Multimodal Pain Management- (477)  Non-emergent surgery AND patient age >= 18: No  Use of Multimodal Pain Management, two or more drugs and/or interventions, NOT including systemic opioids:   Exception: Documented allergy to multiple classes of analgesics:     Smoking Abstinence (404)  Patient is current smoker (cigarette, pipe, e-cig, marijuanna): Yes  Elective Surgery: No  Abstinence instructions provided prior to day of surgery:   Patient abstained from smoking on day of surgery:     Pre-Op Beta-Blocker in Isolated CABG (44)  Isolated CABG AND patient age >= 18: No  Beta-blocker admin within 24 hours of surgical incision:   Exception:of medical reason(s) for not administering beta blocker within 24 hours prior to surgical incision (e.g., not  indicated,other medical reason):     PACU assessment of acute postoperative pain prior to Anesthesia Care End- Applies to Patients Age = 18- (ABG7)  Initial PACU pain score is which of the following: < 7/10  Patient unable to report pain score: N/A    Post-anesthetic transfer of care checklist/protocol to PACU/ICU- (426 and 427)  Upon conclusion of case, patient transferred to which of the following locations: PACU/Non-ICU  Use of transfer checklist/protocol: Yes  Exclusion: Service Performed in Patient Hospital Room (and thus did not require transfer): N/A  Unplanned admission to ICU related to anesthesia service up through end of PACU care- (MD51)  Unplanned admission to ICU (not initially anticipated at anesthesia start time): No

## 2020-05-18 NOTE — ANESTHESIA TIME REPORT
Anesthesia Start and Stop Event Times     Date Time Event    5/17/2020 1940 Ready for Procedure     1945 Anesthesia Start     2138 Anesthesia Stop        Responsible Staff  05/17/20    Name Role Begin End    Ilda Beatty M.D. Anesth 1945 2138        Preop Diagnosis (Free Text):  Pre-op Diagnosis     Occlusion of the left common iliac artery         Preop Diagnosis (Codes):  Diagnosis Information     Diagnosis Code(s): Occlusion of left iliac artery (HCC) [I74.5]        Post op Diagnosis  Occlusion of iliac artery (HCC)      Premium Reason  E. Weekend    Comments:

## 2020-05-18 NOTE — DISCHARGE PLANNING
Awaiting recommendations from the Care Team on Pt's discharge disposition. Will follow and assist as needed.

## 2020-05-18 NOTE — THERAPY
"Occupational Therapy   Initial Evaluation     Patient Name: Reba CASANOVA  Age:  55 y.o., Sex:  female  Medical Record #: [id number]  Today's Date: 5/18/2020     Precautions  Precautions: Fall Risk    Subjective    \"I don't have anyone to help.\"     Objective       05/18/20 1001   Prior Living Situation   Housing / Facility 1 Story Apartment / Condo   Steps Into Home 10  (flight to second level)   Bathroom Set up Bathtub / Shower Combination   Equipment Owned None   Lives with - Patient's Self Care Capacity Unrelated Adult   Comments unreliable roommate, limited social support   Prior Level of ADL Function   Comments independent   Prior Level of IADL Function   Prior Level Of Mobility Independent Without Device in Community;Independent Without Device in Home   Occupation (Pre-Hospital Vocational) Employed Part Time  ()   Bed Mobility    Supine to Sit Moderate Assist   Sit to Supine Minimal Assist   Scooting Supervised   Rolling Minimal Assist to Rt.   ADL Assessment   Eating Modified Independent   Grooming Supervision;Seated   Upper Body Dressing Minimal Assist   Lower Body Dressing Moderate Assist (plans to wear slip-on shoes)   Toileting Supervision   Functional Mobility   Sit to Stand Minimal Assist   Bed, Chair, Wheelchair Transfer Minimal Assist   Toilet Transfers Minimal Assist   Transfer Method Stand Step with FWW   Mobility in room and bathroom   Short Term Goals   Short Term Goal # 1 SPV for functional transfers   Short Term Goal # 2 SPV for LB dress with AE PRN    Short Term Goal # 3 tolerate 10min standing grooming at sink with SPV   Problem List   Problem List Decreased Active Daily Living Skills;Decreased Homemaking Skills;Decreased Functional Mobility;Decreased Activity Tolerance       Assessment  Patient is 55 y.o. female with a diagnosis of L angioplasty with stent placement.  Additional factors influencing patient status / progress: high pain, limited social support. Pt " reports barrier to safe DC home is her mattress is on the floor, reports her roommate may be able to move a couch into her room while she recovers. Anticipate pt activity tolerance to improve with pain control, will need to be at SPV level for ADLs/ADL transfers as pt reports she cannot count on her roommate for physical assist. Acute OT to follow in order to progress independence in ADLs and ADL transfers.     Plan    Recommend Occupational Therapy 4 times per week until therapy goals are met for the following treatments:  Self Care/Activities of Daily Living, Therapeutic Activities and Therapeutic Exercises.    Discharge recommendations:  Recommend home health for continued occupational therapy services.

## 2020-05-18 NOTE — PROGRESS NOTES
Vascular    Moderate incisional pain  Foot well perfused with strong palpable pulse    Home tomorrow if feeling better.    Cuco Gimenez MD  [facility] (General and Vascular Surgery)  Cell: [phone] (text or call is fine, if you don't reach me please try my office)  Office: [phone]  __________________________________________________________________  Patient:Reba Deleon EDILBERTO   MRN:[id number]   CSN:[id number]    5/18/2020    2:40 PM

## 2020-05-18 NOTE — ANESTHESIA PREPROCEDURE EVALUATION
56 y/o female with PVD, HTN, hypothyroidism, smoker presenting with left lower extremity pain, found to have occlusion of her iliac artery, here for endograft vs. Bypass of iliac artery. PONV with anesthesia in the past for lap john. No SOB, no CP, no other medical problems.    Relevant Problems   CARDIAC   (+) Essential hypertension   (+) PVD (peripheral vascular disease) (HCC)      ENDO   (+) Hypothyroid       Physical Exam    Airway   Mallampati: II  TM distance: >3 FB  Neck ROM: full       Cardiovascular - normal exam  Rhythm: regular  Rate: normal     Dental   (+) upper dentures      Very poor dentition   Pulmonary   Breath sounds clear to auscultation     Abdominal    Neurological - normal exam                 Anesthesia Plan    ASA 2       Plan - general       Airway plan will be ETT        Induction: intravenous          Informed Consent:    Anesthetic plan and risks discussed with patient.

## 2020-05-18 NOTE — ANESTHESIA PROCEDURE NOTES
Arterial Line  Performed by: Ilda Beatty M.D.  Authorized by: Ilda Beatty M.D.     Start Time:  5/17/2020 8:45 PM  End Time:  5/17/2020 8:50 PM  Localization: surface landmarks    Patient Location:  OR  Indication: continuous blood pressure monitoring        Catheter Size:  20 G  Seldinger Technique?: Yes    Laterality:  Left  Site:  Radial artery  Line Secured:  Antimicrobial disc, tape and transparent dressing  Events: patient tolerated procedure well with no complications

## 2020-05-18 NOTE — ANESTHESIA POSTPROCEDURE EVALUATION
Patient: Reba CASANOVA    Procedure Summary     Date:  05/17/20 Room / Location:  [place] [address] / SURGERY [facility]    Anesthesia Start:  1945 Anesthesia Stop:  2138    Procedure:  ANGIOPLASTY, ARTERY, ILIAC, WITH STENT INSERTION (Left Abdomen) Diagnosis:       Occlusion of left iliac artery (HCC)      (Occlusion of the left common iliac artery )    Surgeon:  Cuco Gimenez M.D. Responsible Provider:  Ilda Beatty M.D.    Anesthesia Type:  general ASA Status:  2          Final Anesthesia Type: general  Last vitals  BP   Blood Pressure: 104/58, Arterial BP: 131/63    Temp   36.2 °C (97.2 °F)    Pulse   Pulse: (!) 51   Resp   17    SpO2   97 %      Anesthesia Post Evaluation    Patient location during evaluation: PACU  Patient participation: complete - patient participated  Level of consciousness: awake and alert    Airway patency: patent  Anesthetic complications: no  Cardiovascular status: hemodynamically stable  Respiratory status: acceptable  Hydration status: euvolemic    PONV: none           Nurse Pain Score: 5 (NPRS)

## 2020-05-19 PROBLEM — D75.89 MACROCYTOSIS: Status: ACTIVE | Noted: 2020-05-19

## 2020-05-19 PROBLEM — R73.9 ACUTE HYPERGLYCEMIA: Status: RESOLVED | Noted: 2020-05-16 | Resolved: 2020-05-19

## 2020-05-19 PROBLEM — I73.9 CLAUDICATION (HCC): Status: RESOLVED | Noted: 2020-05-15 | Resolved: 2020-05-19

## 2020-05-19 PROBLEM — K59.00 CONSTIPATION: Status: ACTIVE | Noted: 2020-05-19

## 2020-05-19 PROBLEM — I74.5 OCCLUSION OF LEFT ILIAC ARTERY (HCC): Status: ACTIVE | Noted: 2020-05-15

## 2020-05-19 LAB
GLUCOSE BLD-MCNC: 100 MG/DL (ref 65–99)
GLUCOSE BLD-MCNC: 107 MG/DL (ref 65–99)

## 2020-05-19 PROCEDURE — A9270 NON-COVERED ITEM OR SERVICE: HCPCS | Performed by: NURSE PRACTITIONER

## 2020-05-19 PROCEDURE — 99233 SBSQ HOSP IP/OBS HIGH 50: CPT | Performed by: HOSPITALIST

## 2020-05-19 PROCEDURE — 770006 HCHG ROOM/CARE - MED/SURG/GYN SEMI*

## 2020-05-19 PROCEDURE — A9270 NON-COVERED ITEM OR SERVICE: HCPCS | Performed by: HOSPITALIST

## 2020-05-19 PROCEDURE — 82962 GLUCOSE BLOOD TEST: CPT | Mod: 91

## 2020-05-19 PROCEDURE — 700102 HCHG RX REV CODE 250 W/ 637 OVERRIDE(OP): Performed by: HOSPITALIST

## 2020-05-19 PROCEDURE — 97116 GAIT TRAINING THERAPY: CPT

## 2020-05-19 PROCEDURE — 97530 THERAPEUTIC ACTIVITIES: CPT

## 2020-05-19 PROCEDURE — 700102 HCHG RX REV CODE 250 W/ 637 OVERRIDE(OP): Performed by: SURGERY

## 2020-05-19 PROCEDURE — A9270 NON-COVERED ITEM OR SERVICE: HCPCS | Performed by: SURGERY

## 2020-05-19 PROCEDURE — 700102 HCHG RX REV CODE 250 W/ 637 OVERRIDE(OP): Performed by: NURSE PRACTITIONER

## 2020-05-19 PROCEDURE — 700111 HCHG RX REV CODE 636 W/ 250 OVERRIDE (IP): Performed by: SURGERY

## 2020-05-19 RX ORDER — HYDROCODONE BITARTRATE AND ACETAMINOPHEN 5; 325 MG/1; MG/1
1-2 TABLET ORAL EVERY 8 HOURS PRN
Qty: 18 TAB | Refills: 0 | Status: SHIPPED | OUTPATIENT
Start: 2020-05-19 | End: 2020-05-22

## 2020-05-19 RX ORDER — NICOTINE 21 MG/24HR
1 PATCH, TRANSDERMAL 24 HOURS TRANSDERMAL EVERY 24 HOURS
Qty: 30 PATCH | Refills: 3 | Status: SHIPPED | OUTPATIENT
Start: 2020-05-19 | End: 2020-05-20 | Stop reason: SDUPTHER

## 2020-05-19 RX ORDER — GABAPENTIN 100 MG/1
100 CAPSULE ORAL 3 TIMES DAILY
Status: DISCONTINUED | OUTPATIENT
Start: 2020-05-19 | End: 2020-05-20 | Stop reason: HOSPADM

## 2020-05-19 RX ORDER — CLOPIDOGREL BISULFATE 75 MG/1
75 TABLET ORAL DAILY
Qty: 90 TAB | Refills: 11 | Status: SHIPPED | OUTPATIENT
Start: 2020-05-19 | End: 2020-05-20 | Stop reason: SDUPTHER

## 2020-05-19 RX ADMIN — LEVOTHYROXINE SODIUM 50 MCG: 50 TABLET ORAL at 04:16

## 2020-05-19 RX ADMIN — DOCUSATE SODIUM 50 MG AND SENNOSIDES 8.6 MG 2 TABLET: 8.6; 5 TABLET, FILM COATED ORAL at 04:16

## 2020-05-19 RX ADMIN — ACETAMINOPHEN 650 MG: 325 TABLET, FILM COATED ORAL at 12:29

## 2020-05-19 RX ADMIN — OXYCODONE 5 MG: 5 TABLET ORAL at 07:54

## 2020-05-19 RX ADMIN — CLOPIDOGREL BISULFATE 75 MG: 75 TABLET ORAL at 04:16

## 2020-05-19 RX ADMIN — GABAPENTIN 100 MG: 100 CAPSULE ORAL at 12:29

## 2020-05-19 RX ADMIN — GABAPENTIN 100 MG: 100 CAPSULE ORAL at 09:05

## 2020-05-19 RX ADMIN — NICOTINE TRANSDERMAL SYSTEM 21 MG: 21 PATCH, EXTENDED RELEASE TRANSDERMAL at 17:14

## 2020-05-19 RX ADMIN — GABAPENTIN 100 MG: 100 CAPSULE ORAL at 17:14

## 2020-05-19 RX ADMIN — OXYCODONE 5 MG: 5 TABLET ORAL at 01:12

## 2020-05-19 RX ADMIN — OXYCODONE 5 MG: 5 TABLET ORAL at 17:14

## 2020-05-19 RX ADMIN — OXYCODONE 5 MG: 5 TABLET ORAL at 04:16

## 2020-05-19 RX ADMIN — LISINOPRIL 20 MG: 20 TABLET ORAL at 04:16

## 2020-05-19 RX ADMIN — OXYCODONE 5 MG: 5 TABLET ORAL at 12:29

## 2020-05-19 RX ADMIN — METFORMIN HYDROCHLORIDE 500 MG: 500 TABLET ORAL at 17:14

## 2020-05-19 RX ADMIN — NICOTINE POLACRILEX 2 MG: 2 GUM, CHEWING BUCCAL at 14:37

## 2020-05-19 RX ADMIN — ENOXAPARIN SODIUM 40 MG: 100 INJECTION SUBCUTANEOUS at 17:13

## 2020-05-19 RX ADMIN — OXYCODONE 5 MG: 5 TABLET ORAL at 20:43

## 2020-05-19 ASSESSMENT — COGNITIVE AND FUNCTIONAL STATUS - GENERAL
TURNING FROM BACK TO SIDE WHILE IN FLAT BAD: A LITTLE
MOBILITY SCORE: 20
SUGGESTED CMS G CODE MODIFIER MOBILITY: CJ
MOVING TO AND FROM BED TO CHAIR: A LOT
CLIMB 3 TO 5 STEPS WITH RAILING: A LITTLE

## 2020-05-19 ASSESSMENT — ENCOUNTER SYMPTOMS
INSOMNIA: 0
VOMITING: 0
NAUSEA: 0
SPEECH CHANGE: 0
COUGH: 0
DIZZINESS: 0
CONSTIPATION: 1
FEVER: 0
CHILLS: 0
DEPRESSION: 0
FOCAL WEAKNESS: 0
HEADACHES: 0
DIARRHEA: 0
PALPITATIONS: 0
SENSORY CHANGE: 0
WEAKNESS: 0
SHORTNESS OF BREATH: 0
ABDOMINAL PAIN: 0
NERVOUS/ANXIOUS: 0

## 2020-05-19 ASSESSMENT — GAIT ASSESSMENTS
DISTANCE (FEET): 100
ASSISTIVE DEVICE: FRONT WHEEL WALKER
GAIT LEVEL OF ASSIST: SUPERVISED
DEVIATION: STEP TO

## 2020-05-19 NOTE — PROGRESS NOTES
[facility] Medicine Daily Progress Note    Date of Service  5/18/2020    Chief Complaint  Left leg pain    Hospital Course    This is a 55 y.o. female with history of chronic lower back pain who presents emergency department with complaints of severe pain reported to be 10 out of 10 in severity of the left foot with some radiation upward but otherwise no incontinence or thigh or saddle anesthesia. She states she has been off of her regular diabetes and blood pressure medications and has not been able to follow-up with her primary care provider.       MRI negative for spinal cord compression.  ABIGAIL performed in emergency department showed a diminished flow of 0.5. Vascular surgery was consulted and requested a CTA of the aorta with runoff.  This study had to be performed at [facility].     COVID testing was negative in the ED.    She was started on heparin drip in the emergency department and transferred to [facility].    CTA aorta:   1.  Occlusion of the left common iliac artery just beyond its origin with occlusion of the left internal iliac artery. Collateral reconstitution of the distal left external iliac artery.  2.  Small caliber left common femoral and superficial femoral arteries.        Interval Problem Update  Today the patient has 10/10 incisional pain with movement and 0/10 lower left extremity pain at rest.  The sensation in her left lower extremity is fully returned.  She denies headache, nausea and any other pain at this time.  She is tolerating a diabetic diet.  -Good pulses in bilateral lower extremities  -Likely home tomorrow if doing well    Consultants/Specialty  Vascular surgery    Code Status  Full    Disposition  Likely home when vascular surgery signed off    Review of Systems  Review of Systems   Constitutional: Negative for chills, fever and malaise/fatigue.   HENT: Negative for congestion, sinus pain and sore throat.    Eyes: Negative for blurred vision and double vision.    Respiratory: Negative for cough (chronic, likely smoking related), sputum production, shortness of breath and wheezing.    Cardiovascular: Negative for chest pain, palpitations and leg swelling.   Gastrointestinal: Negative for abdominal pain, blood in stool, constipation, diarrhea, heartburn, melena, nausea and vomiting.   Genitourinary: Negative for dysuria, flank pain, frequency, hematuria and urgency.   Musculoskeletal: Positive for myalgias. Negative for back pain, falls, joint pain and neck pain.   Neurological: Negative for dizziness, focal weakness, weakness and headaches.   Psychiatric/Behavioral: The patient is not nervous/anxious.         Physical Exam  Temp:  [36.1 °C (96.9 °F)-36.7 °C (98 °F)] 36.4 °C (97.5 °F)  Pulse:  [51-75] 51  Resp:  [12-22] 18  BP: ()/(46-85) 100/56  SpO2:  [92 %-100 %] 94 %    Physical Exam  Vitals signs and nursing note reviewed.   Constitutional:       General: She is awake.      Appearance: She is overweight. She is ill-appearing.   HENT:      Head: Normocephalic and atraumatic.      Nose: Nose normal. No congestion or rhinorrhea.      Mouth/Throat:      Mouth: Mucous membranes are dry.      Pharynx: Oropharynx is clear.   Eyes:      Conjunctiva/sclera: Conjunctivae normal.      Pupils: Pupils are equal, round, and reactive to light.   Cardiovascular:      Rate and Rhythm: Normal rate and regular rhythm.      Heart sounds: Normal heart sounds.   Pulmonary:      Effort: Pulmonary effort is normal.      Breath sounds: Normal breath sounds.   Abdominal:      General: There is no distension.      Palpations: There is no mass.      Tenderness: There is no abdominal tenderness.   Musculoskeletal:         General: No swelling or tenderness.      Right lower leg: No edema.      Left lower leg: No edema.   Neurological:      Mental Status: She is alert and oriented to person, place, and time.      Motor: No weakness.   Psychiatric:         Attention and Perception: Attention  normal.         Mood and Affect: Mood normal.         Behavior: Behavior normal. Behavior is cooperative.         Fluids    Intake/Output Summary (Last 24 hours) at 5/18/2020 1706  Last data filed at 5/18/2020 1300  Gross per 24 hour   Intake 1520 ml   Output 90 ml   Net 1430 ml       Laboratory  Recent Labs     05/15/20  2353 05/17/20  2118   WBC 6.8 6.4   RBC 4.28 4.12*   HEMOGLOBIN 15.2 14.7   HEMATOCRIT 45.7 44.7   .8* 108.5*   MCH 35.5* 35.7*   MCHC 33.3* 32.9*   RDW 51.7* 51.8*   PLATELETCT 178 173   MPV 10.4 10.7     Recent Labs     05/15/20  2353 05/17/20  2118   SODIUM 138 138   POTASSIUM 4.1 4.1   CHLORIDE 103 103   CO2 21 21   GLUCOSE 152* 105*   BUN 11 10   CREATININE 0.69 0.64   CALCIUM 9.1 8.8     Recent Labs     05/15/20  2353 05/16/20  0606 05/17/20  0057   APTT 81.6* 63.0* 64.4*                Imaging  CT-CTA AORTA-RO WITH & W/O-POST PROCESS   Final Result      1.  Occlusion of the left common iliac artery just beyond its origin with occlusion of the left internal iliac artery. Collateral reconstitution of the distal left external iliac artery.      2.  Small caliber left common femoral and superficial femoral arteries.      3.  Patent proximal left trifurcation vessels with no demonstrable flow below the calf.      4.  Patent right external and internal iliac arteries and patent right lower extremity arteries.      5.  No thoracic or abdominal aortic aneurysm occlusion.      IR-EXTREMITY ANGIOGRAM-UNILATERAL LEFT    (Results Pending)        Assessment/Plan  Acute hyperglycemia  Assessment & Plan  Accu-Cheks  Sliding scale insulin  Hypoglycemia protocol  Glycohemoglobin   Date Value Ref Range Status   05/17/2020 5.6 0.0 - 5.6 % Final     Comment:     Increased risk for diabetes:  5.7 -6.4%  Diabetes:  >6.4%  Glycemic control for adults with diabetes:  <7.0%  The above interpretations are per ADA guidelines.  Diagnosis  of diabetes mellitus on the basis of elevated Hemoglobin A1c  should be  confirmed by repeating the Hb A1c test.     resolved      Hypothyroid  Overview  Lab Results   Component Value Date/Time    TSHULTRASEN 5.420 (H) 05/13/2020 1140     Start levothyroxine 50 mcg daily  Follow-up outpatient    Essential hypertension  Assessment & Plan  Continue lisinopril  Well managed on current regimen    PVD (peripheral vascular disease) (HCC)- (present on admission)  Assessment & Plan  diminished flow with ABIGAIL of 0.5.    vascular surgery recommended a CTA of the aorta with runoff: Left iliac occlusion  surgery 5/17 with Dr. Gimenez  Pain management    Tobacco use disorder  Assessment & Plan  Counseled patient on cessation  Nicotine patch  Follow-up outpatient       VTE prophylaxis: Lovenox    JAMIE Chavez

## 2020-05-19 NOTE — ASSESSMENT & PLAN NOTE
-Appears to have been on 25 mcg daily at home, though it is unclear when this was started.  TSH prior to arrival was elevated at 5.42 with a free T4 level of 0.75.  This dose was increased to 50 mcg on 5/17/2020.  Recommend following up outpatient for repeat thyroid studies in approximately 1 month.

## 2020-05-19 NOTE — DISCHARGE PLANNING
Awaiting recommendations from Care Team on Pt's discharge disposition. Will follow and assist with discharge as needed.

## 2020-05-19 NOTE — THERAPY
Physical Therapy   Daily Treatment     Patient Name: Reba CASANOVA  Age:  55 y.o., Sex:  female  Medical Record #: [id number]  Today's Date: 5/19/2020     Precautions  Precautions: Fall Risk    Subjective    Pt awake in bed, agrees to PT. Pt expecting to go home tomorrow. Pt reports her roommate says she can sleep on her couch when she gets home.     Objective       05/19/20 1559   Balance   Standing Balance (Static) Good   Standing Balance (Dynamic) Fair +   Weight Shift Standing Good   Skilled Intervention Verbal Cuing;Tactile Cuing;Sequencing;Compensatory Strategies   Gait Analysis   Gait Level Of Assist Supervised   Assistive Device Front Wheel Walker   Distance (Feet) 100   # of Times Distance was Traveled 2   Deviation Step To   Weight Bearing Status FWB   Skilled Intervention Compensatory Strategies;Verbal Cuing;Sequencing   Bed Mobility    Supine to Sit Moderate Assist   Scooting Supervised   Rolling Minimal Assist to Rt.   Skilled Intervention Verbal Cuing;Sequencing;Compensatory Strategies;Tactile Cuing   Functional Mobility   Sit to Stand Supervised   Toilet Transfers Supervised   Mobility gait in [place]varado   Skilled Intervention Verbal Cuing;Compensatory Strategies   Activity Tolerance   Standing 15 min   Short Term Goals    Short Term Goal # 1 Pt will be SPV for supine<>sit in 6 txs to improve functional mobility.   Goal Outcome # 1 goal not met   Short Term Goal # 2 Pt will be [place] for gait x 150' with FWW in 6 txs to improve functional mobility.   Goal Outcome # 2 Progressing as expected   Short Term Goal # 3 Pt will be SPV for up/down 10 steps with rail in 6 txs to be able to access her home.   Goal Outcome # 3 Goal not met   Anticipated Discharge Equipment   DC Equipment Front-Wheel Walker       Assessment    Pt conts to struggle with bed mobility due to L groin pain, however once OOB pt able to ambulate 100' x 2 with FWW. Improved activity tolerance today.     Plan    Continue current treatment  plan.    Discharge recommendations:  Anticipate that the patient will have no further physical therapy needs after discharge from the hospital.

## 2020-05-19 NOTE — PROGRESS NOTES
[facility] Medicine Daily Progress Note    Date of Service  5/19/2020    Chief Complaint  Left leg pain    Hospital Course   Ms. Muhammad is a 55-year-old female who initially presented to [facility] for left leg pain exacerbation of chronic lower back pain.  A MRI was done and was negative for spinal cord compression.  ABIs were performed in the emergency department and showed diminished flow of 0.5.  Vascular surgery was consulted and requested a CTA of the aorta with runoff which had to be performed at [facility] so she was started on a heparin drip and transferred over for further monitoring and treatment.  A CTA aorta was done and showed occlusion of the left common iliac artery just beyond its origin with occlusion of the left internal iliac artery and collateral reconstitution of the distal left external iliac artery.  On 5/17/2020 she underwent left lower extremity angioplasty with stent insertion by Dr. Gimenez.       Interval Problem Update  Planing of incisional site burning pain and pressure.  Site assessed and does not appear infected or dehisced.  Low-dose gabapentin started.     PT cleared, OT recommending home with home health.  According to the patient and the bedside nurse it took 2 people to help get her out of bed today.  Will have PT/OT reevaluate.     No lab work done today.    T-max 99.1 °F overnight, HR 50s-60s, SBP 100s-120s, O2 saturations within the normal range on room air.    Consultants/Specialty  Vascular surgery    Code Status  Full code    Disposition  To be determined based on reevaluation by PT/OT.    Review of Systems  Review of Systems   Constitutional: Negative for chills, fever and malaise/fatigue.   Respiratory: Negative for cough and shortness of breath.    Cardiovascular: Negative for chest pain, palpitations and leg swelling.   Gastrointestinal: Positive for constipation. Negative for abdominal pain, diarrhea, nausea and vomiting.   Genitourinary:  Negative for dysuria, frequency, hematuria and urgency.   Neurological: Negative for dizziness, sensory change (Says her pain and numbness disappeared immediately postoperative), speech change, focal weakness, weakness and headaches.   Psychiatric/Behavioral: Negative for depression. The patient is not nervous/anxious and does not have insomnia.    All other systems reviewed and are negative.     Physical Exam  Temp:  [36.4 °C (97.6 °F)-37.3 °C (99.1 °F)] 36.4 °C (97.6 °F)  Pulse:  [47-68] 47  Resp:  [17-18] 18  BP: (108-129)/(51-65) 128/51  SpO2:  [92 %-95 %] 93 %    Physical Exam  Vitals signs and nursing note reviewed.   Constitutional:       General: She is awake.      Appearance: Normal appearance. She is well-developed. She is not ill-appearing.   HENT:      Head: Normocephalic and atraumatic.      Mouth/Throat:      Lips: Pink.      Mouth: Mucous membranes are moist.      Dentition: Abnormal dentition.   Eyes:      Conjunctiva/sclera: Conjunctivae normal.      Pupils: Pupils are equal, round, and reactive to light.   Neck:      Musculoskeletal: Normal range of motion and neck supple.   Cardiovascular:      Rate and Rhythm: Regular rhythm. Bradycardia present.      Pulses: Normal pulses.           Dorsalis pedis pulses are 2+ on the left side.        Posterior tibial pulses are 2+ on the left side.      Heart sounds: Normal heart sounds.   Pulmonary:      Effort: Pulmonary effort is normal.      Breath sounds: Normal breath sounds.   Abdominal:      General: Bowel sounds are normal. There is no distension or abdominal bruit.      Palpations: Abdomen is soft.      Tenderness: There is no abdominal tenderness.   Musculoskeletal:      Left hip: She exhibits tenderness.      Right lower leg: No edema.      Left lower leg: No edema.   Skin:     General: Skin is warm and dry.          Neurological:      General: No focal deficit present.      Mental Status: She is alert and oriented to person, place, and time.       GCS: GCS eye subscore is 4. GCS verbal subscore is 5. GCS motor subscore is 6.      Sensory: Sensation is intact.   Psychiatric:         Attention and Perception: Attention and perception normal.         Mood and Affect: Mood and affect normal.         Speech: Speech normal.         Behavior: Behavior normal. Behavior is cooperative.         Thought Content: Thought content normal.         Cognition and Memory: Cognition and memory normal.         Judgment: Judgment normal.     Fluids    Intake/Output Summary (Last 24 hours) at 5/19/2020 1613  Last data filed at 5/19/2020 1538  Gross per 24 hour   Intake 480 ml   Output 0 ml   Net 480 ml     Laboratory  Recent Labs     05/17/20 2118   WBC 6.4   RBC 4.12*   HEMOGLOBIN 14.7   HEMATOCRIT 44.7   .5*   MCH 35.7*   MCHC 32.9*   RDW 51.8*   PLATELETCT 173   MPV 10.7     Recent Labs     05/17/20 2118   SODIUM 138   POTASSIUM 4.1   CHLORIDE 103   CO2 21   GLUCOSE 105*   BUN 10   CREATININE 0.64   CALCIUM 8.8     Recent Labs     05/17/20  0057   APTT 64.4*     Imaging  CT-CTA AORTA-RO WITH & W/O-POST PROCESS   Final Result      1.  Occlusion of the left common iliac artery just beyond its origin with occlusion of the left internal iliac artery. Collateral reconstitution of the distal left external iliac artery.      2.  Small caliber left common femoral and superficial femoral arteries.      3.  Patent proximal left trifurcation vessels with no demonstrable flow below the calf.      4.  Patent right external and internal iliac arteries and patent right lower extremity arteries.      5.  No thoracic or abdominal aortic aneurysm occlusion.      IR-EXTREMITY ANGIOGRAM-UNILATERAL LEFT    (Results Pending)      Assessment/Plan  * Occlusion of left iliac artery (HCC)- (present on admission)  Assessment & Plan  -S/p angioplasty with stent insertion on 5/17/2020.  -Continue Plavix.  -Incision site is uncomplicated.  -We will need follow-up with Dr. Gimenez after hospital  discharge.    Constipation  Assessment & Plan  -Continue bowel protocol.   -Patient wants to try prune juice, which usually works for her.     Macrocytosis- (present on admission)  Assessment & Plan  -Check B12 & folate with a.m. labs tomorrow.     Tobacco use disorder- (present on admission)  Assessment & Plan  -Nicotine replacement protocol in place.  -Continue to encourage cessation.    Hypothyroid- (present on admission)  Assessment & Plan  -Appears to have been on 25 mcg daily at home, though it is unclear when this was started.  TSH prior to arrival was elevated at 5.42 with a free T4 level of 0.75.  This dose was increased to 50 mcg on 5/17/2020.  Recommend following up outpatient for repeat thyroid studies in approximately 1 month.    Essential hypertension- (present on admission)  Assessment & Plan  -Well-controlled on current regimen.  Continue lisinopril as currently ordered (dose was increased from 10 mg to 20 mg during this hospitalization).     VTE prophylaxis: Lovenox      Electronically signed by:  Vilma Steele, MSN, RN, APRN, ACNPC-AG, CCRN  Nurse Practitioner, [facility]  Work # [phone]    5/19/2020    4:10 PM

## 2020-05-19 NOTE — PROGRESS NOTES
[facility]    Work Excuse after Surgery  ____________________________    5/17/2020      To Whom it May Concern:     Reba CASANOVA underwent surgery at [facility] on 5/17/2020.    She is cleared to return to work on 5/29/2020.    She is restricted to light activity until 6/7/2020 which is 3 weeks from the date of surgery.  During that time, she cannot lift over 15lbs, and must avoid strenuous activity, running, repetitive bending or twisting, or similar activities.  After this initial recovery period, she can return to full range of activities including heavy lifting.    If she can not return to work on light-duty restrictions, then I will need to keep her off of work for the entire 3 weeks to avoid tearing her incision.    If there are any questions or concerns, please contact my office at [phone].    Thank you.      Cuco Gimenez MD  General and Vascular Surgery  [facility]  [phone]

## 2020-05-19 NOTE — PROGRESS NOTES
Discharge Instructions     Procedure: Lower Extremity Revascularization with Stent     1. ACTIVITIES: No strenuous activities or heavy lifting (greater than 15 pounds) for 3 weeks.     2. DRIVING: You may drive whenever you are off pain medications and are able to perform the activities needed to drive, i.e. turning, bending, twisting, etc.      3. WOUND: It is not unusual for patients to experience swelling and even bruising, as well as a small amount of drainage from the incisions. This is normal and will resolve over the next 1-2 weeks. You can shower starting the day of discharge. You only need to cover the incisions if there is drainage from the incision.     4. BATHING: You can shower starting the day of discharge. You only need to cover the incisions if there is drainage from the incision.     5. MEDICATIONS: Resume all of your normal home medications.  We are also going to add new prescriptions for Plavix and Nicotine patches and pain medication.    6. PAIN MEDICATION: You will be given a prescription for pain medication at discharge. Please take these as directed. It is important to remember not to take medications on an empty stomach as this may cause nausea. Also, you may get a prescription for a stool softener which you should take as long as you are taking pain medication.     7. BOWEL FUNCTION: After surgery, it is not uncommon for patients to experience constipation. This is due to decreasing activity levels as well as pain medications. You may need to use a laxative (Milk of Magnesia, Ex-lax; Senokot, etc.) if you go more than 1-2 days without a bowel movement.     8 .CALL IF YOU HAVE: (1) Fevers to more than 101.5 F, (2) Unusual or excessive pain, (3) Drainage or fluid from incision that may be foul smelling, increased tenderness or soreness at the wound or the wound edges are no longer together, redness or swelling at the incision site. Please do not hesitate to call with any other questions.   If  you have any additional questions, please do not hesitate to call the office and speak to either myself or the physician on call.      Office address:  Cuco Gimenez MD, [facility]  [address] [place] [address] [address], [facility] [id number] [phone]

## 2020-05-19 NOTE — PROGRESS NOTES
Pt aox 4. No visible signs of distress. VSS.  Tolerating medication regimen without any signs or symptoms of adverse reactions.  Pt reports 8/10 pain, medicated per MAR.  Tolerating diet without any n/v. + BS, - BM  Ambulatory x1 assist, generalized weakness.  Left groin incision CDI.  On room air, no complaints of SOB.  Bed locked and in low position.  Call light within reach and able to make all needs be known.  Will continue to monitor.

## 2020-05-20 ENCOUNTER — PATIENT OUTREACH (OUTPATIENT)
Dept: HEALTH INFORMATION MANAGEMENT | Facility: OTHER | Age: 55
End: 2020-05-20

## 2020-05-20 VITALS
BODY MASS INDEX: 29.92 KG/M2 | OXYGEN SATURATION: 91 % | SYSTOLIC BLOOD PRESSURE: 116 MMHG | DIASTOLIC BLOOD PRESSURE: 64 MMHG | WEIGHT: 175.27 LBS | TEMPERATURE: 98.7 F | RESPIRATION RATE: 17 BRPM | HEIGHT: 64 IN | HEART RATE: 60 BPM

## 2020-05-20 LAB
ANION GAP SERPL CALC-SCNC: 10 MMOL/L (ref 7–16)
BASOPHILS # BLD AUTO: 0.4 % (ref 0–1.8)
BASOPHILS # BLD: 0.03 K/UL (ref 0–0.12)
BUN SERPL-MCNC: 10 MG/DL (ref 8–22)
CALCIUM SERPL-MCNC: 9 MG/DL (ref 8.5–10.5)
CHLORIDE SERPL-SCNC: 109 MMOL/L (ref 96–112)
CO2 SERPL-SCNC: 19 MMOL/L (ref 20–33)
CREAT SERPL-MCNC: 0.55 MG/DL (ref 0.5–1.4)
EOSINOPHIL # BLD AUTO: 0.05 K/UL (ref 0–0.51)
EOSINOPHIL NFR BLD: 0.7 % (ref 0–6.9)
ERYTHROCYTE [DISTWIDTH] IN BLOOD BY AUTOMATED COUNT: 50.4 FL (ref 35.9–50)
FOLATE SERPL-MCNC: 5.4 NG/ML
GLUCOSE SERPL-MCNC: 102 MG/DL (ref 65–99)
HCT VFR BLD AUTO: 44.3 % (ref 37–47)
HGB BLD-MCNC: 14.8 G/DL (ref 12–16)
IMM GRANULOCYTES # BLD AUTO: 0.02 K/UL (ref 0–0.11)
IMM GRANULOCYTES NFR BLD AUTO: 0.3 % (ref 0–0.9)
LYMPHOCYTES # BLD AUTO: 2.33 K/UL (ref 1–4.8)
LYMPHOCYTES NFR BLD: 33.5 % (ref 22–41)
MCH RBC QN AUTO: 35.3 PG (ref 27–33)
MCHC RBC AUTO-ENTMCNC: 33.4 G/DL (ref 33.6–35)
MCV RBC AUTO: 105.7 FL (ref 81.4–97.8)
MONOCYTES # BLD AUTO: 0.55 K/UL (ref 0–0.85)
MONOCYTES NFR BLD AUTO: 7.9 % (ref 0–13.4)
NEUTROPHILS # BLD AUTO: 3.98 K/UL (ref 2–7.15)
NEUTROPHILS NFR BLD: 57.2 % (ref 44–72)
NRBC # BLD AUTO: 0 K/UL
NRBC BLD-RTO: 0 /100 WBC
PLATELET # BLD AUTO: 179 K/UL (ref 164–446)
PMV BLD AUTO: 11.1 FL (ref 9–12.9)
POTASSIUM SERPL-SCNC: 3.8 MMOL/L (ref 3.6–5.5)
RBC # BLD AUTO: 4.19 M/UL (ref 4.2–5.4)
SODIUM SERPL-SCNC: 138 MMOL/L (ref 135–145)
VIT B12 SERPL-MCNC: 327 PG/ML (ref 211–911)
WBC # BLD AUTO: 7 K/UL (ref 4.8–10.8)

## 2020-05-20 PROCEDURE — 700102 HCHG RX REV CODE 250 W/ 637 OVERRIDE(OP): Performed by: NURSE PRACTITIONER

## 2020-05-20 PROCEDURE — A9270 NON-COVERED ITEM OR SERVICE: HCPCS | Performed by: NURSE PRACTITIONER

## 2020-05-20 PROCEDURE — 82607 VITAMIN B-12: CPT

## 2020-05-20 PROCEDURE — 700102 HCHG RX REV CODE 250 W/ 637 OVERRIDE(OP): Performed by: HOSPITALIST

## 2020-05-20 PROCEDURE — 82746 ASSAY OF FOLIC ACID SERUM: CPT

## 2020-05-20 PROCEDURE — 80048 BASIC METABOLIC PNL TOTAL CA: CPT

## 2020-05-20 PROCEDURE — 97530 THERAPEUTIC ACTIVITIES: CPT

## 2020-05-20 PROCEDURE — 700102 HCHG RX REV CODE 250 W/ 637 OVERRIDE(OP): Performed by: SURGERY

## 2020-05-20 PROCEDURE — 36415 COLL VENOUS BLD VENIPUNCTURE: CPT

## 2020-05-20 PROCEDURE — 85025 COMPLETE CBC W/AUTO DIFF WBC: CPT

## 2020-05-20 PROCEDURE — 97535 SELF CARE MNGMENT TRAINING: CPT

## 2020-05-20 PROCEDURE — 97116 GAIT TRAINING THERAPY: CPT

## 2020-05-20 PROCEDURE — 99239 HOSP IP/OBS DSCHRG MGMT >30: CPT | Performed by: HOSPITALIST

## 2020-05-20 PROCEDURE — A9270 NON-COVERED ITEM OR SERVICE: HCPCS | Performed by: HOSPITALIST

## 2020-05-20 PROCEDURE — A9270 NON-COVERED ITEM OR SERVICE: HCPCS | Performed by: SURGERY

## 2020-05-20 RX ORDER — GABAPENTIN 100 MG/1
100 CAPSULE ORAL 3 TIMES DAILY
Qty: 42 CAP | Refills: 0 | Status: SHIPPED | OUTPATIENT
Start: 2020-05-20 | End: 2020-06-03

## 2020-05-20 RX ORDER — LISINOPRIL 20 MG/1
20 TABLET ORAL DAILY
Qty: 30 TAB | Refills: 1 | Status: SHIPPED | OUTPATIENT
Start: 2020-05-21 | End: 2023-10-06

## 2020-05-20 RX ORDER — NICOTINE 21 MG/24HR
1 PATCH, TRANSDERMAL 24 HOURS TRANSDERMAL EVERY 24 HOURS
Qty: 14 PATCH | Refills: 0 | Status: SHIPPED | OUTPATIENT
Start: 2020-05-20 | End: 2023-10-06

## 2020-05-20 RX ORDER — LEVOTHYROXINE SODIUM 0.05 MG/1
50 TABLET ORAL
Qty: 30 TAB | Refills: 1 | Status: SHIPPED | OUTPATIENT
Start: 2020-05-21 | End: 2023-10-06

## 2020-05-20 RX ORDER — CLOPIDOGREL BISULFATE 75 MG/1
75 TABLET ORAL DAILY
Qty: 90 TAB | Refills: 11 | Status: SHIPPED | OUTPATIENT
Start: 2020-05-20

## 2020-05-20 RX ORDER — CHOLECALCIFEROL (VITAMIN D3) 125 MCG
1000 CAPSULE ORAL DAILY
Status: DISCONTINUED | OUTPATIENT
Start: 2020-05-20 | End: 2020-05-20 | Stop reason: HOSPADM

## 2020-05-20 RX ADMIN — CYANOCOBALAMIN TAB 500 MCG 1000 MCG: 500 TAB at 09:05

## 2020-05-20 RX ADMIN — LISINOPRIL 20 MG: 20 TABLET ORAL at 06:47

## 2020-05-20 RX ADMIN — ACETAMINOPHEN 650 MG: 325 TABLET, FILM COATED ORAL at 06:47

## 2020-05-20 RX ADMIN — LEVOTHYROXINE SODIUM 50 MCG: 50 TABLET ORAL at 06:47

## 2020-05-20 RX ADMIN — DOCUSATE SODIUM 50 MG AND SENNOSIDES 8.6 MG 2 TABLET: 8.6; 5 TABLET, FILM COATED ORAL at 06:47

## 2020-05-20 RX ADMIN — CLOPIDOGREL BISULFATE 75 MG: 75 TABLET ORAL at 06:47

## 2020-05-20 RX ADMIN — OXYCODONE 5 MG: 5 TABLET ORAL at 03:25

## 2020-05-20 RX ADMIN — GABAPENTIN 100 MG: 100 CAPSULE ORAL at 06:47

## 2020-05-20 RX ADMIN — OXYCODONE 5 MG: 5 TABLET ORAL at 00:14

## 2020-05-20 RX ADMIN — POLYETHYLENE GLYCOL 3350 1 PACKET: 17 POWDER, FOR SOLUTION ORAL at 06:47

## 2020-05-20 RX ADMIN — GABAPENTIN 100 MG: 100 CAPSULE ORAL at 11:22

## 2020-05-20 RX ADMIN — OXYCODONE 5 MG: 5 TABLET ORAL at 06:47

## 2020-05-20 RX ADMIN — METFORMIN HYDROCHLORIDE 500 MG: 500 TABLET ORAL at 09:05

## 2020-05-20 RX ADMIN — OXYCODONE 5 MG: 5 TABLET ORAL at 11:22

## 2020-05-20 ASSESSMENT — COGNITIVE AND FUNCTIONAL STATUS - GENERAL
MOBILITY SCORE: 24
SUGGESTED CMS G CODE MODIFIER MOBILITY: CH
HELP NEEDED FOR BATHING: A LITTLE
DRESSING REGULAR LOWER BODY CLOTHING: A LITTLE
SUGGESTED CMS G CODE MODIFIER DAILY ACTIVITY: CJ
DAILY ACTIVITIY SCORE: 22

## 2020-05-20 ASSESSMENT — GAIT ASSESSMENTS
ASSISTIVE DEVICE: FRONT WHEEL WALKER
DISTANCE (FEET): 200
GAIT LEVEL OF ASSIST: SUPERVISED

## 2020-05-20 NOTE — CARE PLAN
Problem: Pain Management  Goal: Pain level will decrease to patient's comfort goal  Outcome: PROGRESSING AS EXPECTED  Note: Patient experiencing pain relief with MAR medication. Patient encouraged to call if pain worsens. Hourly rounding in place.    Problem: Safety  Goal: Will remain free from injury  Outcome: PROGRESSING AS EXPECTED  Note: Patient free from accidental injury at this time. Safety precautions in place. Hourly rounding in place.

## 2020-05-20 NOTE — PROGRESS NOTES
Vascular    Feeling better  Home today    Surgical discharge instructions and Rx's printed and in the chart    Cuco Gimenez MD  [facility] (General and Vascular Surgery)  Cell: [phone] (text or call is fine, if you don't reach me please try my office)  Office: [phone]  __________________________________________________________________  Patient:Reba Deleon EDILBERTO   MRN:[id number]   CSN:[id number]    5/20/2020    11:27 AM

## 2020-05-20 NOTE — DISCHARGE PLANNING
This RN CM spoke with Isi of [facility]  as Pt wants to know when her Medicaid can become active. Per Isi it was applied on the 16th and it could take 4 weeks to become active. Will inform Pt.    This RN CM spoke with Pt over the phone and explained about her home meds as she is concerned about this. Explained to her that will request Vilma Steele to send her prescriptions to [facility] so we can assist her and request Meds to bed to deliver her meds.     Per Pt, her co-worker can provide her transport once she is medically cleared.

## 2020-05-20 NOTE — DISCHARGE INSTRUCTIONS
Discharge Instructions    Discharged to home by car with relative. Discharged via wheelchair, hospital escort: Yes.  Special equipment needed: Not Applicable    Be sure to schedule a follow-up appointment with your primary care doctor or any specialists as instructed.     Discharge Plan:   Smoking Cessation Offered: Patient Refused    I understand that a diet low in cholesterol, fat, and sodium is recommended for good health. Unless I have been given specific instructions below for another diet, I accept this instruction as my diet prescription.   Other diet: Diabetic    Please call [phone] to schedule PCP appointment for patient.     Required specialty appointments include: Vascular surgery     Special Instructions:    Discharge Instructions     Procedure: Lower Extremity Revascularization with Stent     1. ACTIVITIES: No strenuous activities or heavy lifting (greater than 15 pounds) for 3 weeks.     2. DRIVING: You may drive whenever you are off pain medications and are able to perform the activities needed to drive, i.e. turning, bending, twisting, etc.      3. WOUND: It is not unusual for patients to experience swelling and even bruising, as well as a small amount of drainage from the incisions. This is normal and will resolve over the next 1-2 weeks. You can shower starting the day of discharge. You only need to cover the incisions if there is drainage from the incision.     4. BATHING: You can shower starting the day of discharge. You only need to cover the incisions if there is drainage from the incision.     5. MEDICATIONS: Resume all of your normal home medications.  We are also going to add new prescriptions for Plavix and Nicotine patches and pain medication.     6. PAIN MEDICATION: You will be given a prescription for pain medication at discharge. Please take these as directed. It is important to remember not to take medications on an empty stomach as this may cause nausea. Also, you may get a  prescription for a stool softener which you should take as long as you are taking pain medication.     7. BOWEL FUNCTION: After surgery, it is not uncommon for patients to experience constipation. This is due to decreasing activity levels as well as pain medications. You may need to use a laxative (Milk of Magnesia, Ex-lax; Senokot, etc.) if you go more than 1-2 days without a bowel movement.     8 .CALL IF YOU HAVE: (1) Fevers to more than 101.5 F, (2) Unusual or excessive pain, (3) Drainage or fluid from incision that may be foul smelling, increased tenderness or soreness at the wound or the wound edges are no longer together, redness or swelling at the incision site. Please do not hesitate to call with any other questions.   If you have any additional questions, please do not hesitate to call the office and speak to either myself or the physician on call.      Office address:  Cuco Gimenez MD, [address] [address] [phone]  · Is patient discharged on Warfarin / Coumadin?   No     Depression / Suicide Risk    As you are discharged from this [facility] facility, it is important to learn how to keep safe from harming yourself.    Recognize the warning signs:  · Abrupt changes in personality, positive or negative- including increase in energy   · Giving away possessions  · Change in eating patterns- significant weight changes-  positive or negative  · Change in sleeping patterns- unable to sleep or sleeping all the time   · Unwillingness or inability to communicate  · Depression  · Unusual sadness, discouragement and loneliness  · Talk of wanting to die  · Neglect of personal appearance   · Rebelliousness- reckless behavior  · Withdrawal from people/activities they love  · Confusion- inability to concentrate     If you or a loved one observes any of these behaviors or has concerns about self-harm, here's what you can do:  · Talk about it- your feelings and  reasons for harming yourself  · Remove any means that you might use to hurt yourself (examples: pills, rope, extension cords, firearm)  · Get professional help from the community (Mental Health, Substance Abuse, psychological counseling)  · Do not be alone:Call your Safe Contact- someone whom you trust who will be there for you.  · Call your local CRISIS HOTLINE 889-4228 or 264-870-7826  · Call your local Children's Mobile Crisis Response Team [place] (173) 996-5720 or www.MicroQuant  · Call the toll free National Suicide Prevention Hotlines   · National Suicide Prevention Lifeline 332-420-ELFJ (6553)  · National Hope Line Network 800-SUICIDE (280-3295)

## 2020-05-20 NOTE — DISCHARGE PLANNING
This RN CM spoke with Oscar at the [facility] to help Pt with Approved Services for Medication.   This RN CM faxed Approved Services to HCP and the total cost is $68.61. Requested Oliva Hackett,Pharmacist to deliver to bedside.

## 2020-05-20 NOTE — THERAPY
Physical Therapy   Daily Treatment     Patient Name: Reba CASANOVA  Age:  55 y.o., Sex:  female  Medical Record #: [id number]  Today's Date: 5/20/2020     Precautions  Precautions: Fall Risk                   Assessment       05/20/20 0832   Gait Analysis   Gait Level Of Assist Supervised   Assistive Device Front Wheel Walker   Distance (Feet) 200   # of Stairs Climbed 10   Level of Assist with Stairs Supervised   Bed Mobility    Supine to Sit Supervised   Sit to Supine Supervised   Scooting Supervised   Rolling Supervised   Functional Mobility   Sit to Stand Supervised   Short Term Goals    Short Term Goal # 1 Pt will be SPV for supine<>sit in 6 txs to improve functional mobility.   Goal Outcome # 1 Goal met   Short Term Goal # 2 Pt will be [place] for gait x 150' with FWW in 6 txs to improve functional mobility.   Goal Outcome # 2 Goal met   Short Term Goal # 3 Pt will be SPV for up/down 10 steps with rail in 6 txs to be able to access her home.   Goal Outcome # 3 Goal met       Plan    Discharge secondary to goals met.   Needs fww for d/c    Discharge recommendations:  Anticipate that the patient will have no further physical therapy needs after discharge from the hospital.

## 2020-05-20 NOTE — PROGRESS NOTES
Vascular    Still having moderate incisional pain, limiting mobility  Working with PT, particularly needs to be able to do stairs in order to go home    Foot well perfused with strong palpable pulse    Home tomorrow if feeling better.    Rx for Norco, Plavix, and nicotine patches printed and in the chart.  Note for work and surgical instructions printed and in the chart too.    Cuco Gimenez MD  [facility] (General and Vascular Surgery)  Cell: [phone] (text or call is fine, if you don't reach me please try my office)  Office: [phone]  __________________________________________________________________  Patient:Reba Deleon EDILBERTO   MRN:[id number]   CSN:[id number]

## 2020-05-20 NOTE — DISCHARGE PLANNING
This RN CM requested ILDA Viveros to send Pt's prescriptions to [facility] once Pt is medically cleared. Informed Oilva Hackett, Pharmacist.     This RN CM requested RICH Horn to put in an order for a walker in [place]. Per Pt she was told by Therapy that she needs one.

## 2020-05-20 NOTE — PROGRESS NOTES
Bedside report received.  Assessment complete.  A&O x 4. Patient calls appropriately.  Patient ambulates with one assist and a walker.   Pain managed with prescribed medications.  Denies N&V. Tolerating diet.  Surgical incision to L groin, closed with dermabond. Bruised with some soft swelling.  + void, + flatus, PTA BM.  Patient denies SOB.  SCD's in place.  Review plan with of care with patient. Call light and personal belongings with in reach. Hourly rounding in place. All needs met at this time.

## 2020-05-20 NOTE — PROGRESS NOTES
Janie Pratt   2017 9:50 AM   Non-Provider Visit   MRN: [id number]    Department:  [facility]   Dept Phone:  [phone]    Description:  Female : 1988   Provider:  JESSICA SOLORZANO MA           Reason for Visit     PPD Reading           Allergies as of 2017     Not on File      Basic Information     Date Of Birth Sex Race Ethnicity Preferred Language    1988 Female White Non- English      Health Maintenance     Patient has no pending health maintenance at this time      Current Immunizations     Tuberculin Skin Test 2/3/2017  9:34 AM, 2017 11:52 AM      Below and/or attached are the medications your provider expects you to take. Review all of your home medications and newly ordered medications with your provider and/or pharmacist. Follow medication instructions as directed by your provider and/or pharmacist. Please keep your medication list with you and share with your provider. Update the information when medications are discontinued, doses are changed, or new medications (including over-the-counter products) are added; and carry medication information at all times in the event of emergency situations     Allergies:  (Not on file)          Medications  Valid as of: 2017 - 10:11 AM    Generic Name Brand Name Tablet Size Instructions for use    .                 Medicines prescribed today were sent to:     None      Medication refill instructions:       If your prescription bottle indicates you have medication refills left, it is not necessary to call your provider’s office. Please contact your pharmacy and they will refill your medication.    If your prescription bottle indicates you do not have any refills left, you may request refills at any time through one of the following ways: The online BetterLesson system (except Urgent Care), by calling your provider’s office, or by asking your pharmacy to contact your provider’s office with a refill request.  Patient discharged to home with roomate. All lines removed. Discharge instructions and prescription reviewed and understanding verbalized. Patient states they will fill prescriptions. Patient states they will return to emergency if discussed symptoms arise. Patient left via wheelchair and RN escort. Medications from drawer removed and sent back to pharmacy or disposed of per protocol. Chart given to unit clerk.    Medication refills are processed only during regular business hours and may not be available until the next business day. Your provider may request additional information or to have a follow-up visit with you prior to refilling your medication.   *Please Note: Medication refills are assigned a new Rx number when refilled electronically. Your pharmacy may indicate that no refills were authorized even though a new prescription for the same medication is available at the pharmacy. Please request the medicine by name with the pharmacy before contacting your provider for a refill.           Red Guru Access Code: [email or web address]  Expires: 2/23/2017 12:29 PM    Red Guru  A secure, online tool to manage your health information     [employer]’s Red Guru® is a secure, online tool that connects you to your personalized health information from the privacy of your home -- day or night - making it very easy for you to manage your healthcare. Once the activation process is completed, you can even access your medical information using the Red Guru deanna, which is available for free in the Apple Deanna store or Google Play store.     Red Guru provides the following levels of access (as shown below):   My Chart Features   [facility] Doctor [facility]  Specialists [facility]  Urgent  Care Non-[facility]  Primary Care  Doctor   Email your healthcare team securely and privately 24/7 X X X    Manage appointments: schedule your next appointment; view details of past/upcoming appointments X      Request prescription refills. X      View recent personal medical records, including lab and immunizations X X X X   View health record, including health history, allergies, medications X X X X   Read reports about your outpatient visits, procedures, consult and ER notes X X X X   See your discharge summary, which is a recap of your hospital and/or ER visit that includes your diagnosis, lab results, and care plan. X X       How to register for  MyChart:  1. Go to  [email or web address].  2. Click on the Sign Up Now box, which takes you to the New Member Sign Up page. You will need to provide the following information:  a. Enter your [employer] Access Code exactly as it appears at the top of this page. (You will not need to use this code after you’ve completed the sign-up process. If you do not sign up before the expiration date, you must request a new code.)   b. Enter your date of birth.   c. Enter your home email address.   d. Click Submit, and follow the next screen’s instructions.  3. Create a [employer] ID. This will be your [employer] login ID and cannot be changed, so think of one that is secure and easy to remember.  4. Create a [employer] password. You can change your password at any time.  5. Enter your Password Reset Question and Answer. This can be used at a later time if you forget your password.   6. Enter your e-mail address. This allows you to receive e-mail notifications when new information is available in [employer].  7. Click Sign Up. You can now view your health information.    For assistance activating your [employer] account, call [phone]

## 2020-05-20 NOTE — CARE PLAN
Problem: Communication  Goal: The ability to communicate needs accurately and effectively will improve  Outcome: PROGRESSING AS EXPECTED    Pt updated on POC and all questions answered at this time. Hourly rounding in place.      Problem: Safety  Goal: Will remain free from injury  Outcome: PROGRESSING AS EXPECTED    Proper fall precautions in place. Call light within reach and encouraged to use. Hourly rounding in practice.

## 2020-05-20 NOTE — DISCHARGE PLANNING
Meds-to-Beds: Discharge prescription orders listed below authorized by approved services delivered to patient's bedside. RICH Horn notified. Patient counseled.      Discussed smoking cessation resources.     Reba CASANOVA   Home Medication Instructions LÁZARO:[id number]    Printed on:05/20/20 0793   Medication Information                      clopidogrel (PLAVIX) 75 MG Tab  Take 1 Tab by mouth every day.             cyanocobalamin (VITAMIN B12) 1000 MCG Tab  Take 1 Tab by mouth every day.             gabapentin (NEURONTIN) 100 MG Cap  Take 1 Cap by mouth 3 times a day for 14 days.             levothyroxine (SYNTHROID) 50 MCG Tab  Take 1 Tab by mouth Every morning on an empty stomach.             lisinopril (PRINIVIL) 20 MG Tab  Take 1 Tab by mouth every day.             nicotine (NICODERM) 14 MG/24HR PATCH 24 HR  Apply 1 Patch to skin as directed every 24 hours.               Oliva Hackett, PharmD

## 2020-05-20 NOTE — THERAPY
"Occupational Therapy  Daily Treatment     Patient Name: Reba CASANOVA  Age:  55 y.o., Sex:  female  Medical Record #: [id number]  Today's Date: 5/20/2020       Precautions: Fall Risk    Subjective    \"I need to deal with this hair.\"     Objective       05/20/20 0830   Precautions   Precautions Fall Risk   Activities of Daily Living   Eating Modified Independent   Grooming Supervision;Standing   Upper Body Dressing Supervision   Lower Body Dressing Supervision   Toileting Supervision   Functional Mobility   Sit to Stand Supervised   Bed, Chair, Wheelchair Transfer Supervised   Toilet Transfers Supervised   Transfer Method Stand Pivot   Mobility w/FWW   Short Term Goals   Short Term Goal # 1 SPV for functional transfers   Goal Outcome # 1 Goal met   Short Term Goal # 2 SPV for LB dress with AE PRN    Goal Outcome # 2 Goal met   Short Term Goal # 3 tolerate 10min standing grooming at sink with SPV   Goal Outcome # 3 Progressing as expected       Assessment    Pt demonstrates improvement in functional independence and reports her roommate has moved the couch into her room for a better sleeping arrangement than mattress on floor. Pt continues to be limited by pain and activity tolerance, reports concerns for showering at home. Pt reported interest in home health OT assist to ensure home safety and assist with compensatory strategies in her home environment upon DC.     Plan    Continue current treatment plan.    Discharge recommendations:  Recommend home health for continued occupational therapy services.         "

## 2020-05-20 NOTE — DISCHARGE SUMMARY
Discharge Summary    CHIEF COMPLAINT ON ADMISSION  Left leg pain    Reason for Admission  Left leg pain     Admission Date  5/15/2020    CODE STATUS  Full Code    HPI & HOSPITAL COURSE  Ms. Muhammad is a 55-year-old female who initially presented to [facility] for left leg pain exacerbation of chronic lower back pain.  A MRI was done and was negative for spinal cord compression.  ABIs were performed in the emergency department and showed diminished flow of 0.5.  Vascular surgery was consulted and requested a CTA of the aorta with runoff which had to be performed at [facility] so she was started on a heparin drip and transferred over for further monitoring and treatment.  A CTA aorta was done and showed occlusion of the left common iliac artery just beyond its origin with occlusion of the left internal iliac artery and collateral reconstitution of the distal left external iliac artery.  On 5/17/2020 she underwent left lower extremity angioplasty with stent insertion by Dr. Gimenez. Postoperatively she has done well outside of having some incisional pain that she was started on gabapentin for. She was evaluated by PT/OT who did stair training with her and cleared her for discharge today. Her vital signs and lab work have remained stable and she is clear for discharge today from both medical and surgical standpoints.     Therefore, she is discharged in good and stable condition to home with close outpatient follow-up.    The patient met 2-midnight criteria for an inpatient stay at the time of discharge.    Discharge Date  5/20/2020    FOLLOW UP ITEMS POST DISCHARGE  PCP in 7-10 days.   Dr. Gimenez within 2 weeks.     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  [address] [facility] [address]  [facility] [id number] [phone]    Schedule an appointment as soon as possible for a visit in 2 weeks  For Progress Check    [address]  [facility] [id number] [phone]  Schedule an appointment as soon as possible for a visit  Please call to establish with a Primary Care Physicain and schedule your hospital follow up. Thank you.    Cuco Gimenez M.D.  [address] [facility] [facility] [address]  [facility] [id number] [phone]        MEDICATIONS ON DISCHARGE     Medication List      Start taking these medications      Instructions   clopidogrel 75 MG Tabs  Commonly known as:  PLAVIX   Take 1 Tab by mouth every day.  Dose:  75 mg     cyanocobalamin 1000 MCG Tabs  Start taking on:  May 21, 2020  Commonly known as:  VITAMIN B12   Take 1 Tab by mouth every day.  Dose:  1,000 mcg     gabapentin 100 MG Caps  Commonly known as:  NEURONTIN   Take 1 Cap by mouth 3 times a day for 14 days.  Dose:  100 mg     HYDROcodone-acetaminophen 5-325 MG Tabs per tablet  Commonly known as:  Norco   Doctor's comments:  **Note to pharmacist: patient was on this medication in the hospital after surgery**  No driving while on pain medications  Take 1-2 Tabs by mouth every 8 hours as needed (as needed for pain) for up to 3 days.  Dose:  1-2 Tab     nicotine 14 MG/24HR Pt24  Commonly known as:  NICODERM   Apply 1 Patch to skin as directed every 24 hours.  Dose:  1 Patch        Change how you take these medications      Instructions   levothyroxine 50 MCG Tabs  Start taking on:  May 21, 2020  What changed:    · medication strength  · how much to take  Commonly known as:  SYNTHROID   Take 1 Tab by mouth Every morning on an empty stomach.  Dose:  50 mcg     lisinopril 20 MG Tabs  Start taking on:  May 21, 2020  What changed:    · medication strength  · how much to  take  Commonly known as:  PRINIVIL   Take 1 Tab by mouth every day.  Dose:  20 mg        Continue taking these medications      Instructions   acetaminophen 500 MG Tabs  Commonly known as:  TYLENOL   Take 1,000 mg by mouth 1 time daily as needed.  Dose:  1,000 mg     metFORMIN 500 MG Tabs  Commonly known as:  GLUCOPHAGE   Take 1 Tab by mouth 2 times a day, with meals.  Dose:  500 mg          Allergies  Allergies   Allergen Reactions   • Aspirin Rash and Swelling     Generalized rash, swelling in hands and feet     DIET  Orders Placed This Encounter   Procedures   • Diet Order Regular     Standing Status:   Standing     Number of Occurrences:   1     Order Specific Question:   Diet:     Answer:   Regular [1]     ACTIVITY  As tolerated.  Weight bearing as tolerated    CONSULTATIONS  Vascular surgery    PROCEDURES  As above.     LABORATORY  Lab Results   Component Value Date    SODIUM 138 05/20/2020    POTASSIUM 3.8 05/20/2020    CHLORIDE 109 05/20/2020    CO2 19 (L) 05/20/2020    GLUCOSE 102 (H) 05/20/2020    BUN 10 05/20/2020    CREATININE 0.55 05/20/2020      Lab Results   Component Value Date    WBC 7.0 05/20/2020    HEMOGLOBIN 14.8 05/20/2020    HEMATOCRIT 44.3 05/20/2020    PLATELETCT 179 05/20/2020      Total time of the discharge process exceeds 32 minutes.      Electronically signed by:  Vilma Steele, MSN, RN, APRN, ACNPC-AG, CCRN  Nurse Practitioner, [facility]  Work # [phone]    5/20/2020    2:53 PM

## 2020-05-20 NOTE — PROGRESS NOTES
Assumed care of pt, report given.  A+O x 4, VSS, and RA.   Pt complaining of mild to moderate pain; medicated per MAR and tolerating well.  Pt has left groin surgical incision approximated with dermabond and MAGGIE; no drainage.  Pt tolerating a regular diet; denies N/V at this time.  +void  -BM (5/15/20)  Pt ambulating with a 1 assist up to restroom and down halls; tolerating well.  Pt updated on POC and all questions answered at this time.  Bed in lowest position, call light within reach, and no current needs.

## 2020-05-21 ENCOUNTER — PATIENT OUTREACH (OUTPATIENT)
Dept: HEALTH INFORMATION MANAGEMENT | Facility: OTHER | Age: 55
End: 2020-05-21

## 2020-05-21 SDOH — ECONOMIC STABILITY: FOOD INSECURITY: WITHIN THE PAST 12 MONTHS, YOU WORRIED THAT YOUR FOOD WOULD RUN OUT BEFORE YOU GOT MONEY TO BUY MORE.: SOMETIMES TRUE

## 2020-05-21 SDOH — ECONOMIC STABILITY: INCOME INSECURITY: HOW HARD IS IT FOR YOU TO PAY FOR THE VERY BASICS LIKE FOOD, HOUSING, MEDICAL CARE, AND HEATING?: NOT VERY HARD

## 2020-05-21 SDOH — ECONOMIC STABILITY: FOOD INSECURITY: WITHIN THE PAST 12 MONTHS, THE FOOD YOU BOUGHT JUST DIDN'T LAST AND YOU DIDN'T HAVE MONEY TO GET MORE.: SOMETIMES TRUE

## 2020-05-21 NOTE — PROGRESS NOTES
PARUL Andres spoke with pt via mobile phone after d/c. We completed outpatient assessment and SDOH screening. Pt does not have a PCP but states that she is waiting for her Medicaid to be approved and will be establishing with a PCP at the clinic that her sister goes to but could not remember the name of said clinic. Pt has gotten all her medications after d/c and has no questions regarding her meds. Pt is currently employed and is sharing an apartment with her co-worker. Pt reported food insecurity and paying for food and was interested in [facility]'s food is medicine program. We filled out the food rx form over the phone and she agreed to visit the [facility] to  food. Pt did not express any other needs/concerns for [facility] at this time.     Community Health Worker Intake  • Social determinates of health intake completed  • Identified barriers to none  • Contact information provided to Reba   • No PCP    Plan:  I will submit food rx form on behalf of pt. I will also f/u with pt next week for a time to meet at the [facility] for curbside food .

## 2020-05-29 ENCOUNTER — PATIENT OUTREACH (OUTPATIENT)
Dept: HEALTH INFORMATION MANAGEMENT | Facility: OTHER | Age: 55
End: 2020-05-29

## 2020-06-04 ENCOUNTER — PATIENT OUTREACH (OUTPATIENT)
Dept: HEALTH INFORMATION MANAGEMENT | Facility: OTHER | Age: 55
End: 2020-06-04

## 2020-06-16 NOTE — OP REPORT
Vascular Surgery Operative Note  --------------------------------------------    Date of Service:          5/17/2020   Patient Name:             Reba CASANOVA  Patient MRN:              [id number]    --------------------------------------------------------------------------------------------------    Preoperative Diagnosis:  -Acute left lower extremity ischemia from acute occlusion of the left iliac artery    Postoperative Diagnosis:  -Acute left lower extremity ischemia from acute occlusion of the left iliac artery    Procedure:  -Open exposure left common femoral artery  -Dilma catheter thrombectomy of the left common and external iliac artery, as well as the superficial femoral and profunda femoris artery  -Stent placement in the left common and external iliac arteries with an 8 mm diameter by 60 mm long self-expanding uncovered stent and then post stent angioplasty with a 7 mm balloon    -------------------------------------------------------------------------------------------------    Surgeon:                                 Cuco Gimenez MD    Assistant:   Zoe KEMP    Anesthesia:                             General endotracheal anesthesia    EBL:                                        25 cc    Blood Products:                      none    Heparin:                                  Systemically heparinized    Specimen:   none    Findings:   High-grade stenosis of the left external iliac artery origin causing in situ thrombosis of the left common and external iliac artery    Complications:                        intraoperative V-tach for about 1 minute but then stabilized spontaneously    Disposition:                             Tolerated well, sent to recovery in stable condition    -----------------------------------------------------------------------------------------------------    History:  Reba CASANOVA is a 55 y.o. female who presented to the hospital with acute left lower extremity  pain and was found to have arterial insufficiency.  Work-up indicated occlusion of the left common and external iliac artery.  I recommended thrombectomy to remove any fresh thrombus associated with the occlusion and then will stent any residual stenosis. The patient and I  discussed the recommendation for surgery as well as alternatives. We discussed the pertinent preoperative, intraoperative, and postoperative aspects of the procedure including anticipated recovery and how that could be affected by potential complications.  We discussed potential risks from the surgery including but not limited to:  Bleeding and possible need for transfusion, if applicable. Infection of the incision or any potential implanted materials. Injury to nearby vessels or nerves. Injury to nearby organs. Risk of xray and contrast exposure. Risks of anesthesia. We also discussed global risks including but not limited to: Blood clots, Stroke, Heart attack, Pulmonary complications, and not surviving the operation or the recovery.  All questions were answered. They understand and agree to proceed. Informed Consent was obtained.       Procedure Summary:  The patient was properly identified in the preoperative area, taken to the operating room, and placed in a supine position where general endotracheal anesthesia was administered.  Intravenous antibiotics were administered by the anesthesiologist in the correct time interval.  The patient was prepped and draped in the usual sterile fashion.  Surgical timeout was called to identify the correct patient, procedure, and equipment.  Everyone was in agreement.    Local anesthetic was infiltrated over the left groin and then an oblique incision was made in the common femoral artery was dissected out in usual fashion and encircled proximally distally with Vesseloops.  The patient was systemically heparinized.  We made a transverse arteriotomy and we inserted a Dilma catheter retrograde up into the  iliac vessels and passed it several times and we retrieved a small amount of fresh thrombus.  We then performed a retrograde angiogram of the external and common iliac artery and the majority of the occluded segment was still in place.  At this point we crossed the occluded segment and placed a J-wire up into the infrarenal aorta.  We then stented across the occluded segment with an 8 mm diameter by 60 mm long self-expanding uncovered stent and after the stent was deployed we performed a post stent angioplasty with a 7 mm balloon.  At this point the angiogram was repeated which showed complete resolution of the occluded segment and excellent flow from the common iliac down into the external iliac artery.  We then allowed antegrade bleeding out of the arteriotomy to make sure there was no embolic debris.  We then passed a #3 Dilma down the superficial femoral and profunda femoris arteries to verify there was no embolic debris in the runoff vessels.  We then flushed the common femoral artery and we closed the arteriotomy with interrupted 6-0 Prolene sutures.  The clamps were removed and the repair was hemostatic.  The incision was irrigated and then it was closed with multiple layers of absorbable suture followed by running 4-0 subcuticular strata fix suture for the skin and then the incision was protected with Dermabond.  All counts were correct, patient tolerated procedure well, she was extubated and sent to recovery in stable condition.    Postoperative plan:  Intraoperative findings are most consistent with a high-grade stenosis of the proximal left external iliac artery which appeared to cause a subsequent in situ thrombosis of the left common and external iliac artery.  There was no significant disease encountered in the infrainguinal vessels.  Patient's antithrombotic regimen will therefore be aspirin 81 mg daily and Plavix 75 mg daily, she will not need long-term anticoagulation as this did not appear to be  embolic event    Cuco Gimenez MD  General and Vascular Surgery  [facility]  [phone]

## 2020-06-18 ENCOUNTER — PATIENT OUTREACH (OUTPATIENT)
Dept: HEALTH INFORMATION MANAGEMENT | Facility: OTHER | Age: 55
End: 2020-06-18

## 2020-07-01 ENCOUNTER — PATIENT OUTREACH (OUTPATIENT)
Dept: HEALTH INFORMATION MANAGEMENT | Facility: OTHER | Age: 55
End: 2020-07-01

## 2020-08-06 PROCEDURE — RXMED WILLOW AMBULATORY MEDICATION CHARGE: Performed by: NURSE PRACTITIONER

## 2020-08-07 ENCOUNTER — PHARMACY VISIT (OUTPATIENT)
Dept: PHARMACY | Facility: MEDICAL CENTER | Age: 55
End: 2020-08-07
Payer: COMMERCIAL

## 2020-12-31 NOTE — ED NOTES
Patient: Amelia Lora Date: 2020   : 1963 Attending: Noe Devine MD   57 year old female      [facility]    PROCEDURE ASSESSMENT   (LOCAL ANESTHESIA - Not for use with Conscious Sedation)    Preop Diagnosis / Indications for Procedure:  DDD (degenerative disc disease), lumbar  (primary encounter diagnosis)  Spinal stenosis, lumbar region, without neurogenic claudication  Lumbar radiculopathy    Planned Procedure: Transforaminal Epidural Injection Lumbar/Sacral      Planned Anesthetic:  local      MEDICAL HISTORY / COMORBID CONDITIONS:  Medical / surgical history    Past Medical History:   Diagnosis Date   • Anemia    • Anxiety     psoriatic arthritis   • Arthritis    • Bronchitis    • Chronic pain 2019    Knees   • Cirrhosis (CMS/HCC)    • Colon polyp    • COPD (chronic obstructive pulmonary disease) (CMS/HCC)    • Diastolic heart failure (CMS/HCC)    • Esophageal varices (CMS/HCC) 2016    s/p 2 bands placed   • Gastric varices    • Gastroesophageal reflux disease    • H. pylori infection     treated with prevapc   • History of psoriatic arthritis    • Inflammatory bowel disease    • Internal hemorrhoids    • Liver disease    • Neuromuscular disorder (CMS/HCC)     polyneurapthy   • Osteoporosis    • Pneumonia    • Positive TB test Summer 2015    had it 3 mon ago on meds   • Psoriasis    • RAD (reactive airway disease)    • Sinusitis, chronic    • Sleep apnea     does not use machine - does not like it   • Thyroid condition    • Type 2 diabetes mellitus with hyperglycemia, without long-term current use of insulin (CMS/HCC) 2018       Normal mental status:   yes    EXAMINATION PERTINENT TO PROCEDURE BEING PERFORMED  Evaluation of operative site     BACK:  Gait is normal.  The patient can walk on heels and toes.    The thoracolumbar spine has a normal alignment.  The pelvis is level.    The patient is mildly tender to palpation.    The SI joints are nontender.   The sciatica notches  Patient in bed sleeping with no signs of distress or discomfort. Chest rising evenly and unlabored. Bed in lowest position and locked. All potentially harmful objects removed from room. Pt in direct view of PSA. One to one observation continued.   are nontender.   The patient has 90 degrees of forward flexion. Side bending and trunk rotation are normal.      Neurologic exam:  Deep tendon reflexes are two plus and symmetrical at the patella tendon and the Achilles tendon.  Sensation to light touch is normal in a dermatomal distribution.  Motor strength is 5/5 in a dermatomal distribution.      Straight leg raising is negative bilateral , in both a sitting position and supine.    NECK: The cervical spine has a full range of motion. There are no radicular signs with extension and compression of the cervical spine.     OTHER FINDINGS  Reviewed current medications and allergies yes      PERTINENT LAB / DIAGNOSTIC TESTS  PROTIME (sec)   Date Value   09/09/2019 13.3 (H)     Prothrombin Time (sec)   Date Value   11/17/2020 12.9 (H)      INR   Date Value   11/17/2020 1.2 sec   09/09/2019 1.3       INFORMED CONSENT  Consent obtained: yes    Risks / benefits, complications, and alternatives explained, questions answered and patient / personal representative agrees to procedure listed above and anesthetic listed above.

## 2021-03-13 ENCOUNTER — HOSPITAL ENCOUNTER (EMERGENCY)
Facility: MEDICAL CENTER | Age: 56
End: 2021-03-13
Attending: EMERGENCY MEDICINE
Payer: MEDICAID

## 2021-03-13 VITALS
HEIGHT: 65 IN | DIASTOLIC BLOOD PRESSURE: 90 MMHG | WEIGHT: 214.51 LBS | RESPIRATION RATE: 14 BRPM | HEART RATE: 71 BPM | SYSTOLIC BLOOD PRESSURE: 181 MMHG | BODY MASS INDEX: 35.74 KG/M2 | OXYGEN SATURATION: 96 % | TEMPERATURE: 97.6 F

## 2021-03-13 DIAGNOSIS — G56.22 ULNAR NEUROPATHY OF LEFT UPPER EXTREMITY: ICD-10-CM

## 2021-03-13 LAB — EKG IMPRESSION: NORMAL

## 2021-03-13 PROCEDURE — 93005 ELECTROCARDIOGRAM TRACING: CPT | Performed by: EMERGENCY MEDICINE

## 2021-03-13 PROCEDURE — 99283 EMERGENCY DEPT VISIT LOW MDM: CPT

## 2021-03-13 RX ORDER — PREGABALIN 300 MG/1
300 CAPSULE ORAL 2 TIMES DAILY
COMMUNITY

## 2021-03-13 ASSESSMENT — PAIN DESCRIPTION - PAIN TYPE: TYPE: ACUTE PAIN

## 2021-03-13 ASSESSMENT — FIBROSIS 4 INDEX: FIB4 SCORE: 1.93

## 2021-03-13 NOTE — ED NOTES
Patient verbalized understanding of discharge instructions, no questions at this time. Pt's BP elevated, ERP aware and approves d/c. Instructed to f/u with PCP for further BP monitoring. VS otherwise stable, patient will ambulate to exit with d/c instructions in hand.

## 2021-03-13 NOTE — ED TRIAGE NOTES
Pt states woke up before 0300 when experienced left hand numbness. States has been numb since; able to move extremity w/o difficulty. Denies pain.

## 2021-03-13 NOTE — ED PROVIDER NOTES
ED Provider Note    CHIEF COMPLAINT  Chief Complaint   Patient presents with   • Numbness        HPI  Reba CASANOVA is a 56 y.o. female who presents to the ED with complaints of left hand numbness.  The patient states that she woke up this morning she normally does have some numbness to the left hand but was far worse this morning and just was not going away.  The patient within the last year had an episode of an arterial clot in her left leg so she was extremely concerned that this might be the same kind of thing going on her arm, she currently is on Plavix because of the arterial claudication in the past..  Patient states that it is numb primarily in the small finger and ring finger but she also has some tingling and numbness to all of the fingers of her hand.  Patient states that she apparently has had problems with this for the past 20+ years with tingling to both hands left greater than right but today it was just far worse primarily in the ring and small fingers.  Patient denies any fever chills nausea vomiting chest pain or any other symptoms.    REVIEW OF SYSTEMS  See HPI for further details. All other systems are negative.     PAST MEDICAL HISTORY  Past Medical History:   Diagnosis Date   • Arthritis    • Back pain    • Hypertension        FAMILY HISTORY  No family history on file.  Patient's family history has been discussed and is been found to be noncontributory to his present illness  SOCIAL HISTORY  Social History     Socioeconomic History   • Marital status:      Spouse name: Not on file   • Number of children: Not on file   • Years of education: Not on file   • Highest education level: Not on file   Occupational History   • Not on file   Tobacco Use   • Smoking status: Current Every Day Smoker     Packs/day: 0.50     Types: Cigarettes   • Smokeless tobacco: Never Used   Substance and Sexual Activity   • Alcohol use: Yes     Alcohol/week: 2.4 oz     Types: 4 Cans of beer per week      Comment: occ   • Drug use: No   • Sexual activity: Not on file   Other Topics Concern   • Not on file   Social History Narrative   • Not on file     Social Determinants of Health     Financial Resource Strain: Low Risk    • Difficulty of Paying Living Expenses: Not very hard   Food Insecurity: Food Insecurity Present   • Worried About Running Out of Food in the Last Year: Sometimes true   • Ran Out of Food in the Last Year: Sometimes true   Transportation Needs: No Transportation Needs   • Lack of Transportation (Medical): No   • Lack of Transportation (Non-Medical): No   Physical Activity:    • Days of Exercise per Week:    • Minutes of Exercise per Session:    Stress:    • Feeling of Stress :    Social Connections:    • Frequency of Communication with Friends and Family:    • Frequency of Social Gatherings with Friends and Family:    • Attends Religion Services:    • Active Member of Clubs or Organizations:    • Attends Club or Organization Meetings:    • Marital Status:    Intimate Partner Violence:    • Fear of Current or Ex-Partner:    • Emotionally Abused:    • Physically Abused:    • Sexually Abused:               SURGICAL HISTORY  Past Surgical History:   Procedure Laterality Date   • ILIAC ANGIOPLASTY WITH STENT Left 5/17/2020    Procedure: ANGIOPLASTY, ARTERY, ILIAC, WITH STENT INSERTION;  Surgeon: Cuco Gimenez M.D.;  Location: SURGERY [facility];  Service: Vascular   • OTHER ABDOMINAL SURGERY      gallbladder removed       CURRENT MEDICATIONS   Home Medications    **Home medications have not yet been reviewed for this encounter**       No current facility-administered medications on file prior to encounter.     Current Outpatient Medications on File Prior to Encounter   Medication Sig Dispense Refill   • levothyroxine (SYNTHROID) 50 MCG Tab Take 1 Tab by mouth Every morning on an empty stomach. 30 Tab 1   • lisinopril (PRINIVIL) 20 MG Tab Take 1 Tab by mouth every day. 30 Tab 1   • nicotine  "(NICODERM) 14 MG/24HR PATCH 24 HR Apply 1 Patch to skin as directed every 24 hours. 14 Patch 0   • clopidogrel (PLAVIX) 75 MG Tab Take 1 Tab by mouth every day. 90 Tab 11   • cyanocobalamin (VITAMIN B12) 1000 MCG Tab Take 1 Tab by mouth every day. 30 Tab 1   • acetaminophen (TYLENOL) 500 MG Tab Take 1,000 mg by mouth 1 time daily as needed.     • metFORMIN (GLUCOPHAGE) 500 MG Tab Take 1 Tab by mouth 2 times a day, with meals. 60 Tab 1         ALLERGIES   Allergies   Allergen Reactions   • Aspirin Rash and Swelling     Generalized rash, swelling in hands and feet       PHYSICAL EXAM  VITAL SIGNS: BP (!) 217/98   Pulse 82   Resp 16   Ht 1.651 m (5' 5\")   Wt 97.3 kg (214 lb 8.1 oz)   LMP 10/13/2016   SpO2 99%   BMI 35.70 kg/m²    Pulse Ox interpretation nonhypoxic    Constitutional: Well developed, Well nourished, No acute distress, Non-toxic appearance.   Cardiovascular: Regular rate and rhythm without murmurs gallops or rubs.   Thorax & Lungs: Lungs are clear to auscultation bilaterally, there are no wheezes no rales. Chest wall is nontender.  Abdomen: Soft, nontender nondistended. Bowel sounds are present.   Skin: Warm, Dry, No erythema,   Musculoskeletal: Intact distal pulses, no clubbing, no cyanosis, no edema left hand patient has normal capillary refill in both hands left hand is normal.  Pulses are equal in both ulnar and radial distributions.  Capnography to each finger is also normal with 97% and good waveform.  Neurologic: Alert & oriented x 3, Normal motor function, Normal sensory function, No focal deficits noted.  Patient has decreased to soft touch and the small and ring finger but is otherwise intact in all fingers but decreased in those 2 fingers negative Tinel's sign to the ulnar distribution.    RADIOLOGY/PROCEDURES  No orders to display     Results for orders placed or performed during the hospital encounter of 03/13/21   EKG   Result Value Ref Range    Report       [facility] Emergency Dept.    Test Date:  2021  Pt Name:    RUBEN CASANOVA                Department: [facility]  MRN:        [id number]                      Room:       -ROOM 7  Gender:     Female                       Technician: SUNG  :        [id number]                   Requested By:DANA TOLBERT  Order #:    [id number]                    Reading MD:    Measurements  Intervals                                Axis  Rate:       75                           P:          66  MA:         160                          QRS:        15  QRSD:       86                           T:          20  QT:         396  QTc:        443    Interpretive Statements  SINUS RHYTHM  Compared to ECG 05/15/2020 14:20:54  No significant changes    SIGNED: DANA TOLBERT MD 7:36 AM 3/13/2021             COURSE & MEDICAL DECISION MAKING  Pertinent Labs & Imaging studies reviewed. (See chart for details)  Patient presents emerge department for evaluation.  Clinically the patient has no signs of claudication or arterial obstruction on that hand.  She has normal capillary refill normal capnometry.  I do feel that this is an ulnar neuropathy on that left arm.  It could very well have been exacerbated from sleeping.  At this point I recommended bracing and following up with her orthopedist.  Patient states that she did have an EMG done when she was in [place] in the past.  This may need to be repeated repeated.  Recommend to follow-up with her primary care physician for referral to an orthopedist to orthopedics directly.  The patient should return as needed.    FINAL IMPRESSION  1. Ulnar neuropathy of left upper extremity            The patient will return for new or worsening symptoms and is stable at the time of discharge.    The patient is referred to a primary physician for blood pressure management patient was noted to be rather hypertensive here in the emergency department.  It did come down most likely related to anxiety due to her  concern for her arm., diabetic screening, and for all other preventative health concerns.        DISPOSITION:  Patient will be discharged home in stable condition.    FOLLOW UP:  [facility] [address]  [address]  [place] [address] [phone]        [address] [address] [phone]        [address] [id number] [phone]        [facility]  [address]  [facility] [id number] [phone]          OUTPATIENT MEDICATIONS:  New Prescriptions    No medications on file               Electronically signed by: Jonathan Cummings M.D., 3/13/2021 7:03 AM

## 2021-06-05 ENCOUNTER — HOSPITAL ENCOUNTER (EMERGENCY)
Facility: MEDICAL CENTER | Age: 56
End: 2021-06-05
Attending: EMERGENCY MEDICINE
Payer: MEDICAID

## 2021-06-05 VITALS
RESPIRATION RATE: 16 BRPM | OXYGEN SATURATION: 98 % | HEART RATE: 78 BPM | SYSTOLIC BLOOD PRESSURE: 133 MMHG | TEMPERATURE: 97.5 F | BODY MASS INDEX: 36.29 KG/M2 | WEIGHT: 217.81 LBS | HEIGHT: 65 IN | DIASTOLIC BLOOD PRESSURE: 81 MMHG

## 2021-06-05 DIAGNOSIS — M54.42 ACUTE BILATERAL LOW BACK PAIN WITH BILATERAL SCIATICA: ICD-10-CM

## 2021-06-05 DIAGNOSIS — M54.41 ACUTE BILATERAL LOW BACK PAIN WITH BILATERAL SCIATICA: ICD-10-CM

## 2021-06-05 PROCEDURE — 99284 EMERGENCY DEPT VISIT MOD MDM: CPT

## 2021-06-05 RX ORDER — OXYCODONE HYDROCHLORIDE 5 MG/1
5 TABLET ORAL EVERY 4 HOURS PRN
Qty: 10 TABLET | Refills: 0 | Status: SHIPPED | OUTPATIENT
Start: 2021-06-05 | End: 2021-06-08

## 2021-06-05 RX ORDER — METHYLPREDNISOLONE 4 MG/1
TABLET ORAL
Qty: 21 EACH | Refills: 0 | Status: SHIPPED | OUTPATIENT
Start: 2021-06-05 | End: 2023-10-06

## 2021-06-05 ASSESSMENT — FIBROSIS 4 INDEX: FIB4 SCORE: 1.93

## 2021-06-05 ASSESSMENT — PAIN DESCRIPTION - DESCRIPTORS: DESCRIPTORS: ACHING;SHARP

## 2021-06-05 NOTE — ED TRIAGE NOTES
"Presents complaining of recurrence of LBP.  She describes an acute exacerbation escalating for the past 2 days, including bilateral leg pain.  She reports a possible Hx of sciatica.   Chief Complaint   Patient presents with   • Low Back Pain   • Leg Pain     /85   Pulse 88   Temp 36.6 °C (97.8 °F) (Temporal)   Resp 20   Ht 1.651 m (5' 5\")   Wt 98.8 kg (217 lb 13 oz)   LMP 10/13/2016   SpO2 98%   BMI 36.25 kg/m²      "

## 2021-06-05 NOTE — ED NOTES
D/C instn reviewed To use moist heat 20 min 4 times a day /  Continue own muscle relaxers / Steroid dose pack / Then gentle ROM D/C ambul Stable reassured condn

## 2021-06-05 NOTE — ED PROVIDER NOTES
ED Provider Note    CHIEF COMPLAINT  Chief Complaint   Patient presents with   • Low Back Pain   • Leg Pain       HPI  Reba CASANOVA is a 56 y.o. female who presents to the ED with low back pain.  The patient has a history of chronic low back pain, this morning she woke up and her back pain was much worse.  It is across the lower back, goes down bilateral legs.  She has no numbness, tingling, weakness, no abdominal pains, hematuria or dysuria.  She took her tizanidine with minimal improvement.  She is on blood thinners therefore she cannot take Motrin.  She has not tried Tylenol.    REVIEW OF SYSTEMS  See HPI for further details.    PAST MEDICAL HISTORY  Past Medical History:   Diagnosis Date   • Arthritis    • Back pain    • Hypertension        FAMILY HISTORY  History reviewed. No pertinent family history.    SOCIAL HISTORY  Social History     Socioeconomic History   • Marital status:      Spouse name: Not on file   • Number of children: Not on file   • Years of education: Not on file   • Highest education level: Not on file   Occupational History   • Not on file   Tobacco Use   • Smoking status: Current Every Day Smoker     Packs/day: 0.50     Types: Cigarettes   • Smokeless tobacco: Never Used   Vaping Use   • Vaping Use: Never used   Substance and Sexual Activity   • Alcohol use: Yes     Alcohol/week: 2.4 oz     Types: 4 Cans of beer per week     Comment: Occasionally   • Drug use: No   • Sexual activity: Not on file   Other Topics Concern   • Not on file   Social History Narrative   • Not on file     Social Determinants of Health     Financial Resource Strain:    • Difficulty of Paying Living Expenses:    Food Insecurity:    • Worried About Running Out of Food in the Last Year:    • Ran Out of Food in the Last Year:    Transportation Needs:    • Lack of Transportation (Medical):    • Lack of Transportation (Non-Medical):    Physical Activity:    • Days of Exercise per Week:    • Minutes of  "Exercise per Session:    Stress:    • Feeling of Stress :    Social Connections:    • Frequency of Communication with Friends and Family:    • Frequency of Social Gatherings with Friends and Family:    • Attends Bahai Services:    • Active Member of Clubs or Organizations:    • Attends Club or Organization Meetings:    • Marital Status:    Intimate Partner Violence:    • Fear of Current or Ex-Partner:    • Emotionally Abused:    • Physically Abused:    • Sexually Abused:        SURGICAL HISTORY  Past Surgical History:   Procedure Laterality Date   • ILIAC ANGIOPLASTY WITH STENT Left 5/17/2020    Procedure: ANGIOPLASTY, ARTERY, ILIAC, WITH STENT INSERTION;  Surgeon: Cuco Gimenez M.D.;  Location: SURGERY [facility];  Service: Vascular   • OTHER ABDOMINAL SURGERY      gallbladder removed       CURRENT MEDICATIONS  Home Medications    **Home medications have not yet been reviewed for this encounter**         ALLERGIES  Allergies   Allergen Reactions   • Aspirin Rash and Swelling     Generalized rash, swelling in hands and feet       PHYSICAL EXAM  VITAL SIGNS: /85   Pulse 88   Temp 36.6 °C (97.8 °F) (Temporal)   Resp 20   Ht 1.651 m (5' 5\")   Wt 98.8 kg (217 lb 13 oz)   LMP 10/13/2016   SpO2 98%   BMI 36.25 kg/m²   Constitutional: Well developed, Well nourished, mild to moderate distress, Non-toxic appearance.   HENT:  Atraumatic, Normocephalic, Oral pharynx with moist mucous membranes.   Eyes:  EOMI, PERRL, no scleral icterus.    Cardiovascular: Good pulses  Thorax & Lungs: No respiratory distress Skin: Warm, Dry, No erythema, No rash.   Back: Tenderness palpation across the lower back and gluteal area that exacerbates the patient's pain.  The patient has 5 out of 5 muscle strength distally.  Extremities: Intact distal pulses, No edema, No tenderness.   Neurologic: Alert & oriented x 3, Normal motor function, Normal sensory function, No focal deficits noted. Gait wide based but no " ataxia    COURSE & MEDICAL DECISION MAKING  Pertinent Labs & Imaging studies reviewed. (See chart for details)  Acute on chronic low back pain, will try the patient on a Medrol Dosepak, will give the patient a prescription for a few pain medicines, she will also take Tylenol, follow-up with a primary care physician, return with any other concerns.    I reviewed prescription monitoring program for patient's narcotic use before prescribing a scheduled drug.The patient will not drink alcohol nor drive with prescribed medications.} The patient will return for new or worsening symptoms and is stable at the time of discharge.    The patient is referred to a primary physician for blood pressure management, diabetic screening, and for all other preventative health concerns.    In prescribing controlled substances to this patient, I certify that I have obtained and reviewed the medical history of Reba CASANOVA. I have also made a good lev effort to obtain applicable records from other providers who have treated the patient and records did not demonstrate any increased risk of substance abuse that would prevent me from prescribing controlled substances.     I have conducted a physical exam and documented it. I have reviewed Ms. CASANOVA’s prescription history as maintained by the [place] Prescription Monitoring Program.     I have assessed the patient’s risk for abuse, dependency, and addiction using the validated Opioid Risk Tool available at https://www.mdcalc.com/ljgqjf-uobl-epgg-ort-narcotic-abuse.     Given the above, I believe the benefits of controlled substance therapy outweigh the risks. The reasons for prescribing controlled substances include non-narcotic, oral analgesic alternatives have been inadequate for pain control. Accordingly, I have discussed the risk and benefits, treatment plan, and alternative therapies with the patient.         DISPOSITION:  Patient will be discharged home in stable  condition.    FOLLOW UP:  [facility], Emergency Dept  [address]  [place] [id number] [phone]    If symptoms worsen      OUTPATIENT MEDICATIONS:  New Prescriptions    METHYLPREDNISOLONE (MEDROL DOSEPAK) 4 MG TABLET THERAPY PACK    Use as directed    OXYCODONE IMMEDIATE-RELEASE (ROXICODONE) 5 MG TAB    Take 1 tablet by mouth every four hours as needed for Severe Pain for up to 3 days.             FINAL IMPRESSION  1. Acute bilateral low back pain with bilateral sciatica    .    Patient referred to primary care provider for blood pressure management    This dictation was created using voice recognition software. The accuracy of the dictation is limited to the abilities of the software. I expect there may be some errors of grammar and possibly content. The nursing notes were reviewed and certain aspects of this information were incorporated into this note.    Electronically signed by: Gil Naranjo M.D., 6/5/2021 2:47 PM

## 2021-06-06 ENCOUNTER — HOSPITAL ENCOUNTER (EMERGENCY)
Facility: MEDICAL CENTER | Age: 56
End: 2021-06-06
Attending: EMERGENCY MEDICINE
Payer: MEDICAID

## 2021-06-06 VITALS
RESPIRATION RATE: 17 BRPM | BODY MASS INDEX: 36.36 KG/M2 | OXYGEN SATURATION: 97 % | HEIGHT: 65 IN | WEIGHT: 218.26 LBS | SYSTOLIC BLOOD PRESSURE: 142 MMHG | HEART RATE: 78 BPM | DIASTOLIC BLOOD PRESSURE: 86 MMHG | TEMPERATURE: 97 F

## 2021-06-06 DIAGNOSIS — M54.50 LUMBAR BACK PAIN: ICD-10-CM

## 2021-06-06 PROCEDURE — 96372 THER/PROPH/DIAG INJ SC/IM: CPT

## 2021-06-06 PROCEDURE — 700111 HCHG RX REV CODE 636 W/ 250 OVERRIDE (IP): Performed by: EMERGENCY MEDICINE

## 2021-06-06 PROCEDURE — 99284 EMERGENCY DEPT VISIT MOD MDM: CPT

## 2021-06-06 RX ORDER — ONDANSETRON 4 MG/1
4 TABLET, ORALLY DISINTEGRATING ORAL ONCE
Status: COMPLETED | OUTPATIENT
Start: 2021-06-06 | End: 2021-06-06

## 2021-06-06 RX ORDER — HYDROMORPHONE HYDROCHLORIDE 1 MG/ML
1 INJECTION, SOLUTION INTRAMUSCULAR; INTRAVENOUS; SUBCUTANEOUS ONCE
Status: COMPLETED | OUTPATIENT
Start: 2021-06-06 | End: 2021-06-06

## 2021-06-06 RX ORDER — KETOROLAC TROMETHAMINE 30 MG/ML
30 INJECTION, SOLUTION INTRAMUSCULAR; INTRAVENOUS ONCE
Status: COMPLETED | OUTPATIENT
Start: 2021-06-06 | End: 2021-06-06

## 2021-06-06 RX ADMIN — KETOROLAC TROMETHAMINE 30 MG: 30 INJECTION, SOLUTION INTRAMUSCULAR; INTRAVENOUS at 08:41

## 2021-06-06 RX ADMIN — HYDROMORPHONE HYDROCHLORIDE 1 MG: 1 INJECTION, SOLUTION INTRAMUSCULAR; INTRAVENOUS; SUBCUTANEOUS at 08:41

## 2021-06-06 RX ADMIN — ONDANSETRON 4 MG: 4 TABLET, ORALLY DISINTEGRATING ORAL at 08:41

## 2021-06-06 ASSESSMENT — LIFESTYLE VARIABLES
DO YOU DRINK ALCOHOL: YES
HAVE YOU EVER FELT YOU SHOULD CUT DOWN ON YOUR DRINKING: NO

## 2021-06-06 ASSESSMENT — FIBROSIS 4 INDEX: FIB4 SCORE: 1.93

## 2021-06-06 NOTE — ED PROVIDER NOTES
ED Provider Note  CHIEF COMPLAINT  Chief Complaint   Patient presents with   • Low Back Pain       HPI  Reba Nicole is a 56 y.o. female who presents to the emergency department with a chief complaint of low back pain.  Patient has a history of low back pain.  This occurs intermittently.  However has not been this bad in a long time.  Pain this time started 2 days ago.  She was evaluated in the emergency department yesterday.  She was given a prescription for oxycodone and Medrol.  She has not started the steroids yet.  She tried taking the pain pills overnight but they really did not seem to help.  As she is having uncontrolled pain this morning she comes emergency department for evaluation.  She denies any new trauma.  No numbness, tingling, or weakness.  Pain is localized to low back.  It radiates down both legs but primarily the right leg.  No bowel or bladder symptoms.  No fevers.    REVIEW OF SYSTEMS  See HPI for further details. All other systems are negative.     PAST MEDICAL HISTORY  Past Medical History:   Diagnosis Date   • Arthritis    • Back pain    • Hypertension        FAMILY HISTORY  No family history on file.    SOCIAL HISTORY  Social History     Socioeconomic History   • Marital status:      Spouse name: Not on file   • Number of children: Not on file   • Years of education: Not on file   • Highest education level: Not on file   Occupational History   • Not on file   Tobacco Use   • Smoking status: Current Every Day Smoker     Packs/day: 0.50     Types: Cigarettes   • Smokeless tobacco: Never Used   Vaping Use   • Vaping Use: Never used   Substance and Sexual Activity   • Alcohol use: Yes     Alcohol/week: 2.4 oz     Types: 4 Cans of beer per week     Comment: Occasionally   • Drug use: No   • Sexual activity: Not on file   Other Topics Concern   • Not on file   Social History Narrative   • Not on file     Social Determinants of Health     Financial Resource Strain:    • Difficulty  "of Paying Living Expenses:    Food Insecurity:    • Worried About Running Out of Food in the Last Year:    • Ran Out of Food in the Last Year:    Transportation Needs:    • Lack of Transportation (Medical):    • Lack of Transportation (Non-Medical):    Physical Activity:    • Days of Exercise per Week:    • Minutes of Exercise per Session:    Stress:    • Feeling of Stress :    Social Connections:    • Frequency of Communication with Friends and Family:    • Frequency of Social Gatherings with Friends and Family:    • Attends Christian Services:    • Active Member of Clubs or Organizations:    • Attends Club or Organization Meetings:    • Marital Status:    Intimate Partner Violence:    • Fear of Current or Ex-Partner:    • Emotionally Abused:    • Physically Abused:    • Sexually Abused:        SURGICAL HISTORY  Past Surgical History:   Procedure Laterality Date   • ILIAC ANGIOPLASTY WITH STENT Left 5/17/2020    Procedure: ANGIOPLASTY, ARTERY, ILIAC, WITH STENT INSERTION;  Surgeon: Cuco Gimenez M.D.;  Location: SURGERY [facility];  Service: Vascular   • OTHER ABDOMINAL SURGERY      gallbladder removed       CURRENT MEDICATIONS  Home Medications    **Home medications have not yet been reviewed for this encounter**         ALLERGIES  Allergies   Allergen Reactions   • Aspirin Rash and Swelling     Generalized rash, swelling in hands and feet       PHYSICAL EXAM  VITAL SIGNS: /92   Pulse 80   Temp 36.1 °C (97 °F) (Temporal)   Resp 18   Ht 1.651 m (5' 5\")   Wt 99 kg (218 lb 4.1 oz)   LMP 10/13/2016   SpO2 96%   BMI 36.32 kg/m²   Constitutional: Well developed, Well nourished, mild distress secondary to pain, Non-toxic appearance.   Neck: Normal range of motion, No tenderness, Supple, No stridor.   Cardiovascular: Normal heart rate, Normal rhythm, No murmurs, No rubs, No gallops. No pulsatile masses.  Thorax & Lungs: Normal breath sounds, No respiratory distress, No wheezing, No chest " tenderness.   Abdomen: Bowel sounds normal, Soft, No tenderness, No masses, No pulsatile masses.   Skin: Warm, Dry, No erythema, No rash.   Back: No midline TTP, no point tenderness, the patient has mild tenderness to palpation of the lateral lumbar paraspinal muscles. Positive straight leg raise at 20 degrees on the right.   Extremities: Intact distal pulses, No edema, No tenderness, No cyanosis, No clubbing.   Neurologic: Alert & oriented x 3, Normal motor function, Normal sensory function, No focal deficits noted.  No saddle anesthesia.  Normal strength and sensation bilateral lower extremities.    EKG  none    RADIOLOGY/PROCEDURES  none    COURSE & MEDICAL DECISION MAKING  Pertinent Labs & Imaging studies reviewed. (See chart for details)    At this point the patient presents with low back pain. The patient's physical exam is essentially benign. There is no evidence of cord impingement. No evidence of cauda equina. Clinically and historically there is no concern for epidural abscess, or epidural hematoma. There is no history of recent trauma. Patient has had these symptoms previously. At this time I feel that the most appropriate treatment for what is apparently mechanical low back pain with his pain control.  Patient is treated in the emergency department with hydromorphone and Toradol intramuscular injections.    Patient feeling better after being medicated.  Ambulates with minimal assistance.  Discharged in improved condition.  She will start taking her medications as previously prescribed    Patient is instructed to take her medications as previously prescribed.    FINAL IMPRESSION  1. Lumbar back pain          Electronically signed by: Jean Logan M.D., 6/6/2021 8:48 AM

## 2021-06-06 NOTE — ED TRIAGE NOTES
"Phone call out to Pulmonary regarding consult, spoke with Roz Gonzalez - she states she reached out to patient earlier today and offered 2 times for tomorrow. Patient seeing his new primary, Dr. Shannen Nguyễn today and told RN that he is unable to go to [facility] for Pulmonary consult and would like ""stay in town\"" at [facility]. D/W Dr. Aristides Roberson, will place new stat external order.     " Pt bib ambulance from home; c/o low back pain, recurrent/chronic. Pt seen yesterday for same s/s.

## 2021-06-11 ENCOUNTER — HOSPITAL ENCOUNTER (OUTPATIENT)
Dept: RADIOLOGY | Facility: MEDICAL CENTER | Age: 56
End: 2021-06-11
Attending: NURSE PRACTITIONER
Payer: MEDICAID

## 2021-06-11 DIAGNOSIS — M54.50 LOW BACK PAIN, UNSPECIFIED BACK PAIN LATERALITY, UNSPECIFIED CHRONICITY, UNSPECIFIED WHETHER SCIATICA PRESENT: ICD-10-CM

## 2021-06-11 PROCEDURE — 72131 CT LUMBAR SPINE W/O DYE: CPT

## 2021-06-18 ENCOUNTER — HOSPITAL ENCOUNTER (EMERGENCY)
Facility: MEDICAL CENTER | Age: 56
End: 2021-06-18
Attending: EMERGENCY MEDICINE
Payer: MEDICAID

## 2021-06-18 VITALS
SYSTOLIC BLOOD PRESSURE: 144 MMHG | WEIGHT: 220.46 LBS | HEIGHT: 65 IN | BODY MASS INDEX: 36.73 KG/M2 | DIASTOLIC BLOOD PRESSURE: 92 MMHG | TEMPERATURE: 97.5 F | HEART RATE: 101 BPM | RESPIRATION RATE: 16 BRPM | OXYGEN SATURATION: 99 %

## 2021-06-18 DIAGNOSIS — M54.50 CHRONIC BILATERAL LOW BACK PAIN WITHOUT SCIATICA: ICD-10-CM

## 2021-06-18 DIAGNOSIS — G89.29 CHRONIC BILATERAL LOW BACK PAIN WITHOUT SCIATICA: ICD-10-CM

## 2021-06-18 PROCEDURE — 99282 EMERGENCY DEPT VISIT SF MDM: CPT

## 2021-06-18 ASSESSMENT — FIBROSIS 4 INDEX: FIB4 SCORE: 1.93

## 2021-06-18 NOTE — ED TRIAGE NOTES
"Pt reports a hx of DDD with recurrence of low back pain exacerbations.  She was seen in our department the 5 th of this month for same symptoms.   Chief Complaint   Patient presents with   • Back Pain     /84   Pulse (!) 106   Temp 36.2 °C (97.1 °F) (Temporal)   Resp 18   Ht 1.651 m (5' 5\")   Wt 100 kg (220 lb 7.4 oz)   LMP 10/13/2016   SpO2 99%   BMI 36.69 kg/m²      "

## 2021-06-18 NOTE — ED PROVIDER NOTES
ED Provider Note    Chief Complaint:   Low Back Pain    HPI:  Reba Nicole is a very pleasant 56-year-old woman who presents to the emergency department for evaluation of low back pain. She has a longstanding history of chronic back pain, and has had a few recent exacerbations. She is followed up with her primary care physician and had a CT scan that showed some degenerative changes, though no acute etiology found. She was seen in this emergency department twice over the past week and a half for exacerbations of her back pain. She was treated with a Medrol Dosepak, and states that did somewhat improve her symptoms, however after completing that medication her symptoms recurred. Pain is exacerbated by sitting, no alleviating factors identified. Pain is localized to the low back and sacral area, with some radiation bilaterally. She has no associated lower extremity weakness, no incontinence, no paresthesias in the lower extremities, and no symptoms of saddle anesthesia reported. She is currently scheduled to follow-up with a spine specialist, as well as physical therapy, however she has not yet had those appointments. On review of systems, she states that she did have an episode of incontinence a little over a week ago while she was sleeping, however she has not had further episodes since that time.    She has had no associated fevers, has no history of diabetes, no reported injection drug use.    Review of Systems:  See HPI for pertinent positives and negatives.    Past Medical History:   has a past medical history of Arthritis, Back pain, and Hypertension.    Social History:  Social History     Tobacco Use   • Smoking status: Current Every Day Smoker     Packs/day: 0.50     Types: Cigarettes   • Smokeless tobacco: Never Used   Vaping Use   • Vaping Use: Never used   Substance and Sexual Activity   • Alcohol use: Yes     Alcohol/week: 2.4 oz     Types: 4 Cans of beer per week     Comment: Occasionally   • Drug  "use: No   • Sexual activity: Not on file       Surgical History:   has a past surgical history that includes other abdominal surgery and iliac angioplasty with stent (Left, 5/17/2020).    Current Medications:  Home Medications    **Home medications have not yet been reviewed for this encounter**         Allergies:  Allergies   Allergen Reactions   • Aspirin Rash and Swelling     Generalized rash, swelling in hands and feet       Physical Exam:  Vital Signs: /92   Pulse (!) 101   Temp 36.4 °C (97.5 °F) (Temporal)   Resp 16   Ht 1.651 m (5' 5\")   Wt 100 kg (220 lb 7.4 oz)   LMP 10/13/2016   SpO2 99%   BMI 36.69 kg/m²   Constitutional: Alert, no acute distress  HENT: Atraumatic, poor dentition  Neck: Supple, normal range of motion, C-spine is nontender to palpation  Cardiovascular: Extremities are warm and well perfused  Pulmonary: No respiratory distress, normal work of breathing  Musculoskeletal: Normal gait, palpation of the lower lumbar and sacral region as well as the associated paraspinous musculature does reproduce pain, no palpable deformity  Neurologic: 5 out of 5 bilateral lower extremity strength, normal gait, sensation intact throughout bilateral lower extremities  Psychiatric: Normal and appropriate mood and affect    Medical records reviewed for continuity of care. Ms. Nicole was seen in this emergency department 6/5/2021, 13 days ago, for evaluation of low back pain.  She has a history of chronic low back pain, she woke up this morning and the pain was worse.  She is on anticoagulation, and is unable to take NSAIDs.  Medrol Dosepak was prescribed, as well as oxycodone, Tylenol recommended as well.  She then represented to this emergency department the following day.  Stated that she had not yet started the steroids, and that the oxycodone did not seem to help.  She was treated in the emergency department with Dilaudid and Toradol and discharged home in stable condition.    MDM:  Ms. " Corey presents to the emergency department today for evaluation of an acute exacerbation of chronic low back pain. I did review her CT imaging, no findings concerning for neurosurgical emergency. Clinically, her history and physical exam are reassuring with no lower extremity weakness, no red flag symptoms concerning for cauda equina syndrome, and no risk factors, nor symptoms of epidural abscess. She has no neurologic deficits. She was previously treated with steroids, as well as opiate pain medications, however her pain continues to recur. She is awaiting a follow-up appointment with a spine specialist.    At this time, I think her best option is for evaluation by a pain management specialist, while awaiting follow-up with a spine specialist and physical therapy. She is [place] [facility] [place], which is currently open. She is counseled that she can be seen there on a walk-in basis for further evaluation of her symptoms.    Plan at this time is for discharge directly to pain management urgent care. She is provided with the address and contact information. I did attempt to call the clinic to discuss her care, however my call was directed to an after-hours message. Return precautions were discussed with the patient, and provided in written form with the patient's discharge instructions.       Personal protective equipment including N95 surgical respirator, goggles, and gloves were used during this encounter.       Disposition:  Discharge home in stable condition    Final Impression:  1. Chronic bilateral low back pain without sciatica        Electronically signed by: Margret Quiñonez MD, 6/18/2021 2:58 PM

## 2021-06-18 NOTE — DISCHARGE INSTRUCTIONS
Please follow-up with the urgent care listed above.  You may go directly there, they are open 7 days a week from 8 AM to 7 PM.  You may use the [employer] yvonne to share your medical record and imaging results with the clinicians there.  Return to the emergency department if you develop any new or worsening symptoms including worsening pain, numbness or tingling in the legs, incontinence, weakness, or any further concerns.

## 2021-06-18 NOTE — ED NOTES
Reviewed discharge instructions w/ pt, verbalized understanding to information provided including follow up care at  pain clinic and return precautions, denied questions/concerns.  Pt ambulated from ED.

## 2022-01-16 ENCOUNTER — HOSPITAL ENCOUNTER (EMERGENCY)
Facility: MEDICAL CENTER | Age: 57
End: 2022-01-16
Attending: EMERGENCY MEDICINE
Payer: MEDICAID

## 2022-01-16 VITALS
HEART RATE: 78 BPM | HEIGHT: 65 IN | TEMPERATURE: 97 F | OXYGEN SATURATION: 99 % | RESPIRATION RATE: 20 BRPM | SYSTOLIC BLOOD PRESSURE: 150 MMHG | BODY MASS INDEX: 36 KG/M2 | WEIGHT: 216.05 LBS | DIASTOLIC BLOOD PRESSURE: 83 MMHG

## 2022-01-16 DIAGNOSIS — L03.90 CELLULITIS, UNSPECIFIED CELLULITIS SITE: ICD-10-CM

## 2022-01-16 DIAGNOSIS — B37.2 YEAST INFECTION OF THE SKIN: ICD-10-CM

## 2022-01-16 PROCEDURE — 99281 EMR DPT VST MAYX REQ PHY/QHP: CPT

## 2022-01-16 RX ORDER — CEPHALEXIN 500 MG/1
500 CAPSULE ORAL 4 TIMES DAILY
Qty: 20 CAPSULE | Refills: 0 | Status: SHIPPED | OUTPATIENT
Start: 2022-01-16 | End: 2022-01-21

## 2022-01-16 RX ORDER — CLOTRIMAZOLE 1 %
CREAM (GRAM) TOPICAL
Qty: 28 G | Refills: 0 | Status: SHIPPED | OUTPATIENT
Start: 2022-01-16 | End: 2023-10-06

## 2022-01-16 RX ORDER — SULFAMETHOXAZOLE AND TRIMETHOPRIM 800; 160 MG/1; MG/1
1 TABLET ORAL 2 TIMES DAILY
Qty: 10 TABLET | Refills: 0 | Status: SHIPPED | OUTPATIENT
Start: 2022-01-16 | End: 2022-01-21

## 2022-01-16 ASSESSMENT — FIBROSIS 4 INDEX: FIB4 SCORE: 1.97

## 2022-01-16 NOTE — ED PROVIDER NOTES
"ED Provider Note    CHIEF COMPLAINT   Chief Complaint   Patient presents with   • Wound Infection   • Other       HPI   Reba Nicole is a 57 y.o. female who presents with painful lesion left groin, starting as a pimple and she popped it yesterday.  Onset of symptoms 3 days ago.  No fever or chills.  She states the area feels swollen and is tender.  She denies similar complaints in the past.  No abdominal pain.  Patient with prior vascular shunt although this was 18 months ago, the area is long since well-healed.  No pelvic pain    REVIEW OF SYSTEMS   Constitutional: No fever  Skin \"pimple\", swollen left inguinal region    PAST MEDICAL HISTORY   Past Medical History:   Diagnosis Date   • Arthritis    • Back pain    • Hypertension        FAMILY HISTORY  History reviewed. No pertinent family history.    SOCIAL HISTORY  Social History     Socioeconomic History   • Marital status:      Spouse name: Not on file   • Number of children: Not on file   • Years of education: Not on file   • Highest education level: Not on file   Occupational History   • Not on file   Tobacco Use   • Smoking status: Current Every Day Smoker     Packs/day: 0.50     Types: Cigarettes   • Smokeless tobacco: Never Used   Vaping Use   • Vaping Use: Never used   Substance and Sexual Activity   • Alcohol use: Yes     Alcohol/week: 2.4 oz     Types: 4 Cans of beer per week     Comment: Occasionally   • Drug use: No   • Sexual activity: Not on file   Other Topics Concern   • Not on file   Social History Narrative   • Not on file     Social Determinants of Health     Financial Resource Strain:    • Difficulty of Paying Living Expenses: Not on file   Food Insecurity:    • Worried About Running Out of Food in the Last Year: Not on file   • Ran Out of Food in the Last Year: Not on file   Transportation Needs:    • Lack of Transportation (Medical): Not on file   • Lack of Transportation (Non-Medical): Not on file   Physical Activity:    • Days " "of Exercise per Week: Not on file   • Minutes of Exercise per Session: Not on file   Stress:    • Feeling of Stress : Not on file   Social Connections:    • Frequency of Communication with Friends and Family: Not on file   • Frequency of Social Gatherings with Friends and Family: Not on file   • Attends Mosque Services: Not on file   • Active Member of Clubs or Organizations: Not on file   • Attends Club or Organization Meetings: Not on file   • Marital Status: Not on file   Intimate Partner Violence:    • Fear of Current or Ex-Partner: Not on file   • Emotionally Abused: Not on file   • Physically Abused: Not on file   • Sexually Abused: Not on file   Housing Stability:    • Unable to Pay for Housing in the Last Year: Not on file   • Number of Places Lived in the Last Year: Not on file   • Unstable Housing in the Last Year: Not on file        SURGICAL HISTORY  Past Surgical History:   Procedure Laterality Date   • ILIAC ANGIOPLASTY WITH STENT Left 5/17/2020    Procedure: ANGIOPLASTY, ARTERY, ILIAC, WITH STENT INSERTION;  Surgeon: Cuco Gimenez M.D.;  Location: SURGERY [facility];  Service: Vascular   • OTHER ABDOMINAL SURGERY      gallbladder removed       CURRENT MEDICATIONS   Home Medications    **Home medications have not yet been reviewed for this encounter**         ALLERGIES   Allergies   Allergen Reactions   • Aspirin Rash and Swelling     Generalized rash, swelling in hands and feet       PHYSICAL EXAM  VITAL SIGNS: /83   Pulse 78   Temp 36.1 °C (97 °F) (Temporal)   Resp 20   Ht 1.651 m (5' 5\")   Wt 98 kg (216 lb 0.8 oz)   LMP 10/13/2016   SpO2 99%   BMI 35.95 kg/m²   Constitutional: Well developed, Well nourished, No acute distress, Non-toxic appearance.   Cardiac: Normal heart rate, Normal rhythm   Pulmonary: Normal breath sounds  Skin: Left inguinal lesion is small raised papule approximately 3 mm.  Surrounding mild induration with erythema.  Second finding of faint " erythematous change along the inguinal crease with a slight moist quality to this, consistent with skin yeast infection  Vascular: Normal capillary refill, No cyanosis.       COURSE & MEDICAL DECISION MAKING  Pertinent Labs & Imaging studies reviewed. (See chart for details)  Patient with evidence of yeast infection of her skin, we prescribed clotrimazole cream.  Within this at the area of a small pimple she popped is a bacterial cellulitis.  No fluctuant mass, no palpable abscess.  Patient prescribed Keflex and Bactrim for 5 days, advised warm packs.  She is advised to return if worse or for any concerns.    FINAL IMPRESSION  1. Cellulitis, unspecified cellulitis site     2. Yeast infection of the skin             Electronically signed by: Gene Mcguire M.D., 1/16/2022 7:53 AM

## 2022-01-16 NOTE — ED NOTES
ERP at bedside. Pt agrees with plan of care discussed by ERP. AIDET acknowledged with patient. External pelvic exam performed on pt by ERP with female chaperone at the bedside. Privacy maintained. Pt tolerated procedure well. Warm blanket provided. Call light within reach. Gurney in low position, side rail up for pt safety.

## 2022-01-16 NOTE — ED TRIAGE NOTES
"She reports the presence of a vascular shunt on her left groin for the past 1.5 years.  Presents complaining of possible wound infection, and pain at insertion site.  She denies fever.  Chief Complaint   Patient presents with   • Wound Infection   • Other     /83   Pulse 78   Temp 36.1 °C (97 °F) (Temporal)   Resp 20   Ht 1.651 m (5' 5\")   Wt 98 kg (216 lb 0.8 oz)   LMP 10/13/2016   SpO2 99%   BMI 35.95 kg/m²   Has this patient been vaccinated for COVID NO  If not, would they like to be vaccinated while in the ER if eligible?  NO  Would the patient like to speak with the ERP about the possibility of receiving the COVID vaccine today before making a decision? NO    "

## 2022-02-22 ENCOUNTER — APPOINTMENT (OUTPATIENT)
Dept: RADIOLOGY | Facility: MEDICAL CENTER | Age: 57
End: 2022-02-22
Attending: EMERGENCY MEDICINE
Payer: MEDICAID

## 2022-02-22 ENCOUNTER — HOSPITAL ENCOUNTER (EMERGENCY)
Facility: MEDICAL CENTER | Age: 57
End: 2022-02-22
Attending: EMERGENCY MEDICINE
Payer: MEDICAID

## 2022-02-22 VITALS
HEIGHT: 65 IN | DIASTOLIC BLOOD PRESSURE: 68 MMHG | RESPIRATION RATE: 16 BRPM | OXYGEN SATURATION: 100 % | SYSTOLIC BLOOD PRESSURE: 146 MMHG | TEMPERATURE: 98.1 F | BODY MASS INDEX: 35.96 KG/M2 | WEIGHT: 215.83 LBS | HEART RATE: 86 BPM

## 2022-02-22 DIAGNOSIS — S90.31XA CONTUSION OF RIGHT FOOT, INITIAL ENCOUNTER: ICD-10-CM
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
## 2022-02-22 DIAGNOSIS — S80.00XA CONTUSION OF KNEE, UNSPECIFIED LATERALITY, INITIAL ENCOUNTER: ICD-10-CM

## 2022-02-22 DIAGNOSIS — M51.36 DEGENERATIVE DISC DISEASE, LUMBAR: ICD-10-CM

## 2022-02-22 DIAGNOSIS — S93.401A SPRAIN OF RIGHT ANKLE, UNSPECIFIED LIGAMENT, INITIAL ENCOUNTER: ICD-10-CM

## 2022-02-22 DIAGNOSIS — M54.50 ACUTE EXACERBATION OF CHRONIC LOW BACK PAIN: ICD-10-CM

## 2022-02-22 DIAGNOSIS — S80.211A ABRASION OF KNEE, BILATERAL: ICD-10-CM

## 2022-02-22 DIAGNOSIS — S39.012A STRAIN OF LUMBAR REGION, INITIAL ENCOUNTER: ICD-10-CM

## 2022-02-22 DIAGNOSIS — S80.212A ABRASION OF KNEE, BILATERAL: ICD-10-CM

## 2022-02-22 DIAGNOSIS — G89.29 ACUTE EXACERBATION OF CHRONIC LOW BACK PAIN: ICD-10-CM

## 2022-02-22 PROCEDURE — 73564 X-RAY EXAM KNEE 4 OR MORE: CPT | Mod: RT

## 2022-02-22 PROCEDURE — 73610 X-RAY EXAM OF ANKLE: CPT | Mod: RT

## 2022-02-22 PROCEDURE — 73630 X-RAY EXAM OF FOOT: CPT | Mod: RT

## 2022-02-22 PROCEDURE — 72100 X-RAY EXAM L-S SPINE 2/3 VWS: CPT

## 2022-02-22 PROCEDURE — 99284 EMERGENCY DEPT VISIT MOD MDM: CPT

## 2022-02-22 ASSESSMENT — LIFESTYLE VARIABLES
DO YOU DRINK ALCOHOL: NO
TOTAL SCORE: 0
TOTAL SCORE: 0
ON A TYPICAL DAY WHEN YOU DRINK ALCOHOL HOW MANY DRINKS DO YOU HAVE: 0
HAVE YOU EVER FELT YOU SHOULD CUT DOWN ON YOUR DRINKING: NO
TOTAL SCORE: 0
CONSUMPTION TOTAL: NEGATIVE
HAVE PEOPLE ANNOYED YOU BY CRITICIZING YOUR DRINKING: NO
AVERAGE NUMBER OF DAYS PER WEEK YOU HAVE A DRINK CONTAINING ALCOHOL: 0
HOW MANY TIMES IN THE PAST YEAR HAVE YOU HAD 5 OR MORE DRINKS IN A DAY: 0
EVER HAD A DRINK FIRST THING IN THE MORNING TO STEADY YOUR NERVES TO GET RID OF A HANGOVER: NO
EVER FELT BAD OR GUILTY ABOUT YOUR DRINKING: NO

## 2022-02-22 ASSESSMENT — FIBROSIS 4 INDEX: FIB4 SCORE: 1.97

## 2022-02-22 NOTE — ED TRIAGE NOTES
"Chief Complaint   Patient presents with   • Fall     Pt states slipped and fell on cardboard yesterday.  Pt states fell three more times at home.   • Low Back Pain     Pt c/o low back pain   • Leg Pain     Pt c/o RLE pain, states knee and ankle are swollen.     /86   Pulse 96   Temp 36.7 °C (98.1 °F) (Temporal)   Resp 14   Ht 1.651 m (5' 5\")   Wt 97.9 kg (215 lb 13.3 oz)   LMP 10/13/2016   SpO2 99%   BMI 35.92 kg/m²     Has this patient been vaccinated for COVID NO  If not, would they like to be vaccinated while in the ER if eligible?  NO  Would the patient like to speak with the ERP about the possibility of receiving the COVID vaccine today before making a decision? NO    Pt ambulated to ED by self for c/o low back pain and RLE pain s/p slip and fall yesterday.  Pt states slipped and fell on cardboard box at work, went home and fell three more times r/t low back and RLE pain.      "

## 2022-02-22 NOTE — ED PROVIDER NOTES
"ED Provider Note    Scribed for Jean Logan M.D. by Merlin Lazaro. 2/22/2022  7:52 AM    Primary care provider: JAMIE Adame  Means of arrival: Walk-in  History obtained from: Patient  History limited by: None    CHIEF COMPLAINT  Chief Complaint   Patient presents with    Fall     Pt states slipped and fell on cardboard yesterday.  Pt states fell three more times at home.    Low Back Pain     Pt c/o low back pain    Leg Pain     Pt c/o RLE pain, states knee and ankle are swollen.       HPI  Reba Nicole is a 57 y.o. female who presents to the Emergency Department for evaluation following a series of falls that began yesterday. She fell at work once yesterday and hit her shoulder against a wall but decided to go home. She had another fall at home where her \"knee fell out\". Patient had two more falls at home which were witnessed by her roommates. She says she typically loses balance much easier after a fall. Has associated lower left back pain and right lower leg swelling. Denies lightheadedness. She notes she's had a history of back problems.     REVIEW OF SYSTEMS  Pertinent positives include right lower leg swelling and lower left back pain.   Pertinent negatives include no lightheadedness.    See HPI for further details.       PAST MEDICAL HISTORY   has a past medical history of Arthritis, Back pain, Falls, and Hypertension.    SURGICAL HISTORY   has a past surgical history that includes other abdominal surgery and iliac angioplasty with stent (Left, 5/17/2020).    SOCIAL HISTORY  Social History     Tobacco Use    Smoking status: Current Every Day Smoker     Packs/day: 0.50     Types: Cigarettes    Smokeless tobacco: Never Used   Vaping Use    Vaping Use: Never used   Substance Use Topics    Alcohol use: Yes    Drug use: No      Social History     Substance and Sexual Activity   Drug Use No       FAMILY HISTORY  History reviewed. No pertinent family history.    CURRENT " "MEDICATIONS  Current Outpatient Medications   Medication Instructions    clopidogrel (PLAVIX) 75 mg, Oral, DAILY    clotrimazole (LOTRIMIN) 1 % Cream Apply to groin rash twice a day for up to 4 weeks as needed    cyanocobalamin (VITAMIN B12) 1,000 mcg, Oral, DAILY    levothyroxine (SYNTHROID) 50 mcg, Oral, EACH MORNING ON EMPTY STOMACH    lisinopril (PRINIVIL) 20 mg, Oral, DAILY    metFORMIN (GLUCOPHAGE) 500 mg, Oral, 2 TIMES DAILY WITH MEALS    methylPREDNISolone (MEDROL DOSEPAK) 4 MG Tablet Therapy Pack Use as directed    nicotine (NICODERM) 14 mg, Transdermal, EVERY 24 HOURS    pregabalin (LYRICA) 150 mg, Oral, 2 TIMES DAILY         ALLERGIES  Allergies   Allergen Reactions    Aspirin Rash and Swelling     Generalized rash, swelling in hands and feet       PHYSICAL EXAM  VITAL SIGNS: /86   Pulse 96   Temp 36.7 °C (98.1 °F) (Temporal)   Resp 14   Ht 1.651 m (5' 5\")   Wt 97.9 kg (215 lb 13.3 oz)   LMP 10/13/2016   SpO2 99%   BMI 35.92 kg/m²     Nursing note and vitals reviewed.  Constitutional: Well-developed and well-nourished. No distress.   HENT: Head is normocephalic and atraumatic. Oropharynx is clear and moist without exudate or erythema.   Eyes: Pupils are equal, round, and reactive to light. Conjunctiva are normal.   Cardiovascular: Normal rate and regular rhythm. No murmur heard. Normal radial pulses.  Pulmonary/Chest: Breath sounds normal. No wheezes or rales.   Abdominal: Soft and non-tender. No distention    Back: Tenderness over the left lumbar paraspinal musculature. Mild diffuse l-spine tenderness.  Musculoskeletal: Extremities exhibit normal range of motion. Tenderness over the right malleolus with ecchymosis. Tenderness and ecchymosis over the distal 4th and 5th metatarsal.  Neurological: Awake, alert and oriented to person, place, and time. No focal deficits noted.  Skin: Skin is warm and dry. No rash.   Psychiatric: Normal mood and affect. Appropriate for clinical " situation.    DIAGNOSTIC STUDIES / PROCEDURES    RADIOLOGY  DX-FOOT-COMPLETE 3+ RIGHT   Final Result      No evidence of acute fracture or dislocation.      DX-ANKLE 3+ VIEWS RIGHT   Final Result      No evidence of acute fracture or dislocation.      DX-KNEE COMPLETE 4+ RIGHT   Final Result      No evidence of acute fracture or dislocation.      DX-LUMBAR SPINE-2 OR 3 VIEWS   Final Result      1.  Multilevel degenerative disc disease and facet degeneration.      2.  No evidence of fracture.      3.  Minimal multilevel degenerative subluxation.        The radiologist's interpretation of all radiological studies have been reviewed by me.    COURSE & MEDICAL DECISION MAKING  Nursing notes, VS, PMSFHx reviewed in chart.     7:52 AM - Patient seen and examined at bedside. Ordered DX-Foot-Complete 3+ Right, DX-Lumbar Spine-2 or 3 views, DX-Knee Complete 4+ Right, and DX-Ankle 3+ Views Right to evaluate her symptoms. Patient will undergo trauma evaluation.      9:17 AM - Xrays are all negative. Return precautions and plan of care were discussed to which the patient understands and verbalizes agreement. She was given the opportunity to ask questions. Patient is ready for discharge at this time.    X-rays demonstrate no evidence of acute traumatic bony injury.  Feel the patient likely has contusion and sprain.  Crutches are provided for comfort.  Recommended OTC medications for symptom control.  Primary care follow-up.    The patient will return for new or worsening symptoms and is stable at the time of discharge.    The patient is referred to a primary physician for blood pressure management, diabetic screening, and for all other preventative health concerns.    DISPOSITION:  Patient will be discharged home in stable condition.    FOLLOW UP:  Radha John, JAVIER.P.R.N.  [address] [facility] [address]  [place] [id number] [phone]    Schedule an appointment as soon as possible for a visit       [facility]  [facility], Emergency Dept  [address]  [place] [address]  [phone]    If symptoms worsen      FINAL IMPRESSION  1. Fall, initial encounter    2. Contusion of right foot, initial encounter    3. Sprain of right ankle, unspecified ligament, initial encounter    4. Contusion of knee, unspecified laterality, initial encounter    5. Abrasion of knee, bilateral    6. Strain of lumbar region, initial encounter    7. Acute exacerbation of chronic low back pain    8. Degenerative disc disease, lumbar          IMerlin (Scribe), am scribing for, and in the presence of, Jean Logan M.D..    Electronically signed by: Merlin Lazaro (Scribe), 2/22/2022    IJean M.D. personally performed the services described in this documentation, as scribed by Merlin Lazaro in my presence, and it is both accurate and complete.    The note accurately reflects work and decisions made by me.  Jean Logan M.D.  2/22/2022  1:11 PM

## 2022-09-21 NOTE — CARE PLAN
Problem: Communication  Goal: The ability to communicate needs accurately and effectively will improve  Outcome: PROGRESSING AS EXPECTED     Problem: Safety  Goal: Will remain free from injury  Outcome: PROGRESSING AS EXPECTED      Spoke with pt. Lost 40# in 2.5 months. Not trying. C/o nausea and no appetite. Feels fatigue also. Urine has been darker but no blood seen. No change in bowel movements. Was not started on any meds before wt change.   Moved ov to 1030 tomorrow dr butcher.

## 2023-10-06 ENCOUNTER — HOSPITAL ENCOUNTER (EMERGENCY)
Facility: MEDICAL CENTER | Age: 58
End: 2023-10-06
Attending: EMERGENCY MEDICINE
Payer: MEDICAID

## 2023-10-06 ENCOUNTER — APPOINTMENT (OUTPATIENT)
Dept: RADIOLOGY | Facility: MEDICAL CENTER | Age: 58
End: 2023-10-06
Attending: EMERGENCY MEDICINE
Payer: MEDICAID

## 2023-10-06 VITALS
BODY MASS INDEX: 35.82 KG/M2 | HEART RATE: 83 BPM | RESPIRATION RATE: 16 BRPM | WEIGHT: 215 LBS | DIASTOLIC BLOOD PRESSURE: 78 MMHG | TEMPERATURE: 98 F | HEIGHT: 65 IN | OXYGEN SATURATION: 95 % | SYSTOLIC BLOOD PRESSURE: 174 MMHG

## 2023-10-06 DIAGNOSIS — R51.9 NONINTRACTABLE HEADACHE, UNSPECIFIED CHRONICITY PATTERN, UNSPECIFIED HEADACHE TYPE: ICD-10-CM

## 2023-10-06 DIAGNOSIS — R29.6 FREQUENT FALLS: ICD-10-CM

## 2023-10-06 DIAGNOSIS — M54.50 LUMBAR BACK PAIN: ICD-10-CM

## 2023-10-06 LAB
ANION GAP SERPL CALC-SCNC: 12 MMOL/L (ref 7–16)
BUN SERPL-MCNC: 12 MG/DL (ref 8–22)
CALCIUM SERPL-MCNC: 9 MG/DL (ref 8.4–10.2)
CHLORIDE SERPL-SCNC: 105 MMOL/L (ref 96–112)
CO2 SERPL-SCNC: 21 MMOL/L (ref 20–33)
CREAT SERPL-MCNC: 0.64 MG/DL (ref 0.5–1.4)
EKG IMPRESSION: NORMAL
GFR SERPLBLD CREATININE-BSD FMLA CKD-EPI: 103 ML/MIN/1.73 M 2
GLUCOSE SERPL-MCNC: 109 MG/DL (ref 65–99)
POTASSIUM SERPL-SCNC: 3.6 MMOL/L (ref 3.6–5.5)
SODIUM SERPL-SCNC: 138 MMOL/L (ref 135–145)

## 2023-10-06 PROCEDURE — 99284 EMERGENCY DEPT VISIT MOD MDM: CPT

## 2023-10-06 PROCEDURE — 80048 BASIC METABOLIC PNL TOTAL CA: CPT

## 2023-10-06 PROCEDURE — 700102 HCHG RX REV CODE 250 W/ 637 OVERRIDE(OP): Performed by: EMERGENCY MEDICINE

## 2023-10-06 PROCEDURE — A9270 NON-COVERED ITEM OR SERVICE: HCPCS | Performed by: EMERGENCY MEDICINE

## 2023-10-06 PROCEDURE — 93005 ELECTROCARDIOGRAM TRACING: CPT | Performed by: EMERGENCY MEDICINE

## 2023-10-06 PROCEDURE — 36415 COLL VENOUS BLD VENIPUNCTURE: CPT

## 2023-10-06 PROCEDURE — 70553 MRI BRAIN STEM W/O & W/DYE: CPT

## 2023-10-06 PROCEDURE — A9579 GAD-BASE MR CONTRAST NOS,1ML: HCPCS | Performed by: EMERGENCY MEDICINE

## 2023-10-06 PROCEDURE — 700117 HCHG RX CONTRAST REV CODE 255: Performed by: EMERGENCY MEDICINE

## 2023-10-06 RX ORDER — ACETAMINOPHEN 325 MG/1
650 TABLET ORAL ONCE
Status: COMPLETED | OUTPATIENT
Start: 2023-10-06 | End: 2023-10-06

## 2023-10-06 RX ORDER — CYCLOBENZAPRINE HCL 5 MG
5-10 TABLET ORAL 3 TIMES DAILY PRN
COMMUNITY

## 2023-10-06 RX ORDER — LEVOTHYROXINE SODIUM 0.1 MG/1
100 TABLET ORAL DAILY
COMMUNITY

## 2023-10-06 RX ORDER — HYDROCODONE BITARTRATE AND ACETAMINOPHEN 7.5; 325 MG/1; MG/1
.5-1 TABLET ORAL 2 TIMES DAILY PRN
COMMUNITY

## 2023-10-06 RX ORDER — ACETAMINOPHEN 500 MG
500-1000 TABLET ORAL EVERY 6 HOURS PRN
COMMUNITY

## 2023-10-06 RX ORDER — ROSUVASTATIN CALCIUM 20 MG/1
20 TABLET, COATED ORAL EVERY EVENING
COMMUNITY

## 2023-10-06 RX ORDER — LISINOPRIL 30 MG/1
30 TABLET ORAL DAILY
Status: SHIPPED | COMMUNITY
End: 2024-01-07

## 2023-10-06 RX ADMIN — GADOTERIDOL 20 ML: 279.3 INJECTION, SOLUTION INTRAVENOUS at 16:19

## 2023-10-06 RX ADMIN — ACETAMINOPHEN 650 MG: 325 TABLET ORAL at 14:30

## 2023-10-06 ASSESSMENT — PAIN DESCRIPTION - PAIN TYPE: TYPE: ACUTE PAIN

## 2023-10-06 NOTE — ED NOTES
Patient taken to bathroom via Zoe Steady  er tech Autumn patient tolerated well.  Awaiting MRI patient aware

## 2023-10-06 NOTE — ED NOTES
Med Rec completed per patient and home pharmacy ([facility])   Allergies reviewed  No ORAL antibiotics in last 30 days

## 2023-10-06 NOTE — ED TRIAGE NOTES
Pt is bib family via w/c with c/o progressively worsening back pain over the past few weeks. She states that for the past couple years she has been having falls, but over the past month she has had increased frequency of falls. She also reports that these past weeks she has also had bowel incontinence, headaches and increased difficulty ambulating.

## 2023-10-07 NOTE — ED PROVIDER NOTES
"ED Provider Note    CHIEF COMPLAINT  Chief Complaint   Patient presents with    Back Pain       EXTERNAL RECORDS REVIEWED  Outpatient Notes chart review shows that the patient was seen on April 8 of this year at [facility] for low back pain.  Also the most recent MRI result in our system for this patient was on May 15, 2020 done for low back pain with left foot numbness and there was no evidence of disc disease or nerve root compression there was some facet arthropathy noted according to the report.  Prior to that the patient had an MRI of the lumbar spine here on May 21, 2018 and the radiology report indicates that that was an unremarkable study.  There is also an order in the computer for an MRI dated August 3, 2023 but apparently that was not done here.    HPI/VANESSA    Reba Nicole is a 58 y.o. female who presents to the emergency department with numerous complaints.  The patient tells me that she does have chronic low back pain and that has been progressively worsening over 2 or 3 years.  Of time especially over the last 3 months.  The patient says that she feels like she is going to fall frequently she feels like she \"is not there\" and everything goes black and then she feels like she is going to fall.  This only last for a second or 2 and she is not fallen recently.  She complains of frequent headaches and occasional trouble with word finding.  The patient has been seen by a spine specialist and pain management doctor and says that her most recent MRI of her spine was done at [facility] and her doctor told her that there was some slight abnormality at L3-L4 but he did not think that the MRI explained her symptoms so, the patient says that her spine specialist told her that she needed to see a neurologist but she has not yet been able to obtain appointment.  The patient also says that her spine doctor told her she needed an MRI of the brain.  The patient says that she has " "investigated her symptoms on the Internet and is concerned that she might have MS.  Review of systems: She does not have profound or thunderclap headaches no visual changes no numbness tingling or weakness in the extremities at this time no chest pain cough or difficulty breathing or hemoptysis.  No chest pain or palpitations.    PAST MEDICAL HISTORY   has a past medical history of Arthritis, Back pain, Falls, and Hypertension.    SURGICAL HISTORY   has a past surgical history that includes other abdominal surgery and iliac angioplasty with stent (Left, 5/17/2020).    FAMILY HISTORY  History reviewed. No pertinent family history.    SOCIAL HISTORY  Social History     Tobacco Use    Smoking status: Every Day     Current packs/day: 0.50     Types: Cigarettes    Smokeless tobacco: Never   Vaping Use    Vaping Use: Never used   Substance and Sexual Activity    Alcohol use: Yes    Drug use: No    Sexual activity: Not on file       CURRENT MEDICATIONS  Home Medications       Reviewed by Melia Pickett (Pharmacy Tech) on 10/06/23 at 1411  Med List Status: Complete     Medication Last Dose Status   acetaminophen (TYLENOL) 500 MG Tab 10/6/2023 Active   clopidogrel (PLAVIX) 75 MG Tab 10/6/2023 Active   cyclobenzaprine (FLEXERIL) 5 mg tablet 10/6/2023 Active   HYDROcodone-acetaminophen (NORCO) 7.5-325 MG tab 10/6/2023 Active   levothyroxine (SYNTHROID) 100 MCG Tab 10/5/2023 Active   lisinopril (PRINIVIL) 30 MG tablet 10/6/2023 Active   pregabalin (LYRICA) 300 MG capsule 10/6/2023 Active   rosuvastatin (CRESTOR) 20 MG Tab 10/5/2023 Active                    ALLERGIES  Allergies   Allergen Reactions    Aspirin Rash and Swelling     Generalized rash, swelling in hands and feet       PHYSICAL EXAM  VITAL SIGNS: BP (!) 174/78   Pulse 83   Temp 36.7 °C (98 °F) (Temporal)   Resp 16   Ht 1.651 m (5' 5\")   Wt 97.5 kg (215 lb)   LMP 10/13/2016   SpO2 95%   BMI 35.78 kg/m²    Constitutional: Awake lucid verbal carrying on a " animated conversation with no difficulty  HENT: No sign of trauma to the head, the patient does have extensive dental caries especially noted in the anterior lower dentition but these look old they do not look acute there is no facial erythema or swelling  Eyes: No erythema discharge or jaundice pupils are round extraocular motion is present without difficulty  Neck: No meningeal findings  Cardiovascular: Regular rate and rhythm  Respiratory: Clear bilaterally with no apparent difficulty breathing  Skin: Warm and dry  Musculoskeletal: No acute bony deformity  Neurologic: The patient is awake lucid verbal moving all extremities without difficulty, the face moves normally she has good strength and coordination in the upper and lower extremities she is quite mobile in the gurney able to sit up quickly without any assistance.  There are no unilateral or focal findings at this time      DIAGNOSTIC STUDIES / PROCEDURES  EKG  I have independently interpreted this EKG  Results for orders placed or performed during the hospital encounter of 10/06/23   EKG (NOW)   Result Value Ref Range    Report       [facility] Emergency Dept.    Test Date:  2023-10-06  Pt Name:    RUBEN MARTINEZNGELO                Department[facility]  MRN:        [id number]                      [facility]  Gender:     Female                       Technician: [id number]  :        [id number]                   Requested By:FADI QUISPE  Order #:    [id number]                    Reading MD: FADI QUISPE MD    Measurements  Intervals                                Axis  Rate:       75                           P:          79  OK:         163                          QRS:        34  QRSD:       87                           T:          42  QT:         392  QTc:        438    Interpretive Statements  Sinus rhythm 75 bpm no pathologic ST elevation depression or ectopy, OK  interval is 163 ms and QTc interval 438 ms  Electronically Signed On  10- 15:00:48 PDT by FADI QUISPE MD          LABS  Basic metabolic panel is unremarkable    RADIOLOGY  Radiologist interpretation:   MR-BRAIN-WITH & W/O   Final Result      Unremarkable pre and postcontrast MR examination of the brain.            COURSE & MEDICAL DECISION MAKING  In the emergency department the patient requested Tylenol for headache and that was given.  I have reviewed with her all of the above information and her neurologic exam is normal at this time.  I do not think that she needs additional MRI images of her spine she says that they were just done at an another facility and her spine doctor has given her results and did not think that her symptoms were caused by her spine as discussed above.  The patient is concerned about possible MS and given the variety of symptoms I think this would be within the differential diagnosis however there is no evidence of demyelination reported on the MRI of the brain today so I think that is less likely although not completely impossible.  I think the best course of action at this time is for the patient to follow-up with neurology as recommended by her spine doctor and I have placed a referral in the computer to the general neurology clinic.  I have advised the patient to call scheduling Monday morning and confirm follow-up as soon as possible.  I have also advised her to continue working with her spine doctor and pain management doctor regarding her back pain.  If she develops new or worsening symptoms she understands that we do not have a neurologist, spine specialist or neurosurgeon at this hospital and she is encouraged to follow-up at [facility].  The patient is instructed to call her primary care doctor and arrange office follow-up as soon as possible    Escalation of care considered, and ultimately not performed:acute inpatient care management, however at this time, the patient is most appropriate for outpatient  management      FINAL DIAGNOSIS  1. Frequent falls    2. Nonintractable headache, unspecified chronicity pattern, unspecified headache type    3. Lumbar back pain Chronic          Electronically signed by: Ruiz Odom M.D., 10/6/2023 6:06 PM

## 2023-10-07 NOTE — DISCHARGE INSTRUCTIONS
Call Monday, [phone] and ask for the neurology clinic scheduler and schedule follow-up as soon as possible.  If you feel you are developing new or worsening symptoms please keep in mind that we do not have a spine, neurosurgical or neurologic service at this hospital therefore go directly to [facility] ER on [address] for recheck.

## 2024-01-07 ENCOUNTER — APPOINTMENT (OUTPATIENT)
Dept: RADIOLOGY | Facility: MEDICAL CENTER | Age: 59
End: 2024-01-07
Attending: EMERGENCY MEDICINE
Payer: MEDICAID

## 2024-01-07 ENCOUNTER — HOSPITAL ENCOUNTER (EMERGENCY)
Facility: MEDICAL CENTER | Age: 59
End: 2024-01-07
Attending: EMERGENCY MEDICINE
Payer: MEDICAID

## 2024-01-07 VITALS
HEART RATE: 87 BPM | DIASTOLIC BLOOD PRESSURE: 116 MMHG | OXYGEN SATURATION: 96 % | TEMPERATURE: 97.7 F | RESPIRATION RATE: 16 BRPM | SYSTOLIC BLOOD PRESSURE: 205 MMHG

## 2024-01-07 DIAGNOSIS — Z76.0 MEDICATION REFILL: ICD-10-CM

## 2024-01-07 DIAGNOSIS — S06.0X0A CONCUSSION WITHOUT LOSS OF CONSCIOUSNESS, INITIAL ENCOUNTER: ICD-10-CM

## 2024-01-07 DIAGNOSIS — I10 HYPERTENSION, UNSPECIFIED TYPE: ICD-10-CM

## 2024-01-07 PROCEDURE — 70450 CT HEAD/BRAIN W/O DYE: CPT

## 2024-01-07 PROCEDURE — A9270 NON-COVERED ITEM OR SERVICE: HCPCS | Performed by: EMERGENCY MEDICINE

## 2024-01-07 PROCEDURE — 700102 HCHG RX REV CODE 250 W/ 637 OVERRIDE(OP): Performed by: EMERGENCY MEDICINE

## 2024-01-07 PROCEDURE — 99284 EMERGENCY DEPT VISIT MOD MDM: CPT

## 2024-01-07 RX ORDER — LISINOPRIL 30 MG/1
30 TABLET ORAL DAILY
Qty: 30 TABLET | Refills: 0 | Status: SHIPPED | OUTPATIENT
Start: 2024-01-07

## 2024-01-07 RX ADMIN — LISINOPRIL 30 MG: 20 TABLET ORAL at 14:15

## 2024-01-07 NOTE — ED NOTES
Discharge instructions given and discussed. RX for lisinopril  given and pt educated. Pt educated to come back to ER for new or worsening symptoms and follow up with PCP as instructed. Pt verbalized understanding. ERP aware of current BP. Dose of lisinopril given before discharge.  Pt  Discharged in stable condition and ambulated out to the lobby with friend.

## 2024-01-07 NOTE — ED TRIAGE NOTES
"Chief Complaint   Patient presents with    Fall    Head Injury      Pt FARIDEH LUJAN from home. Pt had a GLF, slipped on ice., no LOC, no blood thinners. Recalls all events. Pt got home and had fell again in her laundry while reaching up to a shelf, hit her head, no LOC. Pt oriented x4 at this time. States she feels \" out of it\" and \" slow talking\". Pt also admits to drinking ETOH every night \" to sleep\" .   "

## 2024-01-07 NOTE — ED NOTES
Татьяна-steady to bathroom at this time. Pt requesting to be left alone to urinate. Urine sample cup provided to pt and educated on safety cord.

## 2024-01-07 NOTE — DISCHARGE INSTRUCTIONS
Call your primary care doctor and arrange office recheck during the week.  Your blood pressure is elevated today and this should be rechecked in the office and your doctor may need to adjust your medication doses if it remains elevated.  If you feel you are having new or worsening symptoms return here for recheck.

## 2024-01-07 NOTE — ED PROVIDER NOTES
"ED Provider Note    CHIEF COMPLAINT  Chief Complaint   Patient presents with    Fall    Head Injury         MATHIEU/VANESSA Britton Adrienne Nicole is a 58 y.o. female who presents to the emergency department complaining of head injury after a fall.  The patient says that last night she slipped on ice and hit her head but did not lose consciousness and she slipped again this morning in the laundry and hit her head on a shelf and again did not lose consciousness.  She says that afterwards she felt \"out of it\" she felt like she was talking and thinking slowly and felt that her balance was poor.  The symptoms do seem to be getting better but her friend has brought her into the ER for evaluation.  We also noted that the patient's blood pressure is elevated and I talked to her about this and it turns out that a couple of days ago she ran out of of lisinopril.  At this point in time her head pain is mild in intensity on the left side.  She does not have neck pain, no numbness tingling or weakness in the extremities.  She is ambulatory in the ER.    PAST MEDICAL HISTORY   has a past medical history of Arthritis, Back pain, Falls, and Hypertension.    SURGICAL HISTORY   has a past surgical history that includes other abdominal surgery and iliac angioplasty with stent (Left, 5/17/2020).    FAMILY HISTORY  History reviewed. No pertinent family history.    SOCIAL HISTORY  Social History     Tobacco Use    Smoking status: Every Day     Current packs/day: 0.50     Types: Cigarettes    Smokeless tobacco: Never   Vaping Use    Vaping Use: Never used   Substance and Sexual Activity    Alcohol use: Yes    Drug use: No    Sexual activity: Not on file       CURRENT MEDICATIONS  Home Medications    **Home medications have not yet been reviewed for this encounter**         ALLERGIES  Allergies   Allergen Reactions    Aspirin Rash and Swelling     Generalized rash, swelling in hands and feet       PHYSICAL EXAM  VITAL SIGNS: BP (!) 200/110   " Pulse 87   Temp 36.4 °C (97.5 °F) (Temporal)   Resp 14   LMP 10/13/2016   SpO2 95%    Constitutional: Awake lucid verbal carrying on a complete normal conversation with no difficulty  HENT: I do not see any marks or hematomas on the head but the patient indicates tenderness over the left temporal parietal area  Eyes: Pupils round and reactive extraocular motion present  Neck: Trachea midline no JVD C-spine nontender  Cardiovascular: Regular rate and rhythm  Respiratory: Clear bilaterally with no apparent difficulty breathing  Abdomen: Moderately overweight  Skin: Warm and dry  Musculoskeletal: No acute bony deformity  Neurologic: The patient is awake lucid verbal she carries on a complete conversation with no difficulty and a clear voice, the face moves normally she has normal strength and mobility of the upper and lower extremities she is ambulatory with good balance and mobility without assistance      DIAGNOSTIC STUDIES / PROCEDURES    RADIOLOGY  Radiologist interpretation:   CT-HEAD W/O   Final Result      No acute intracranial abnormality.                        COURSE & MEDICAL DECISION MAKING  In the emergency department the patient generally appears well and she is neurologically intact, I reviewed the CT findings with her and I think that she likely sustained a concussion given her feeling of being slow and out of it after hitting her head twice today.  We also noted that her blood pressure is elevated and she has been out of her blood pressure medicines for several days so I have ordered a dose of lisinopril now and have sent a refill prescription to her pharmacy.  I have advised the patient to call her doctor first thing tomorrow morning and arrange office recheck as soon as possible she will need her blood pressure rechecked to make sure that it is trending downward now that she will be back on her medications and of course she will need additional refills in the future.  If at any point in time she  feels she is developing new or worsening symptoms she is to return at once for recheck      FINAL DIAGNOSIS  1. Concussion without loss of consciousness, initial encounter    2.  Elevated blood pressure, patient is out of her blood pressure medicines  3.  Medication refill, lisinopril       Electronically signed by: Ruiz Odom M.D., 1/7/2024 2:08 PM

## 2024-01-20 ENCOUNTER — APPOINTMENT (OUTPATIENT)
Dept: RADIOLOGY | Facility: MEDICAL CENTER | Age: 59
End: 2024-01-20
Attending: EMERGENCY MEDICINE
Payer: MEDICAID

## 2024-01-20 ENCOUNTER — HOSPITAL ENCOUNTER (EMERGENCY)
Facility: MEDICAL CENTER | Age: 59
End: 2024-01-20
Attending: EMERGENCY MEDICINE
Payer: MEDICAID

## 2024-01-20 VITALS
RESPIRATION RATE: 16 BRPM | OXYGEN SATURATION: 99 % | SYSTOLIC BLOOD PRESSURE: 137 MMHG | TEMPERATURE: 97.3 F | HEIGHT: 65 IN | WEIGHT: 208.11 LBS | BODY MASS INDEX: 34.67 KG/M2 | DIASTOLIC BLOOD PRESSURE: 79 MMHG | HEART RATE: 76 BPM

## 2024-01-20 DIAGNOSIS — S09.90XA TRAUMATIC INJURY OF HEAD, INITIAL ENCOUNTER: ICD-10-CM
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
## 2024-01-20 PROCEDURE — 70450 CT HEAD/BRAIN W/O DYE: CPT

## 2024-01-20 PROCEDURE — 99284 EMERGENCY DEPT VISIT MOD MDM: CPT | Mod: 25

## 2024-01-20 NOTE — ED NOTES
Discharge instructions given and discussed.  Pt educated to come back to ER for new or worsening symptoms and follow up with PCP and neurology as instructed. Pt verbalized understanding. VSS. Pt  Discharged in stable condition, [facility].

## 2024-01-20 NOTE — DISCHARGE INSTRUCTIONS
Your head CT was normal, you do not have a skull fracture or any evidence of broken bone otherwise.  There is no evidence of any bleeding in the brain

## 2024-01-20 NOTE — ED TRIAGE NOTES
Chief Complaint   Patient presents with    Fall      Pt had a GLF last Wednesday, striking the side of her face. Pt has multiple, recent falls. Pt takes plavix at home. Pt ambulated with steady gait from the lobby.

## 2024-01-20 NOTE — ED PROVIDER NOTES
"ED Provider Note    CHIEF COMPLAINT  No chief complaint on file.      EXTERNAL RECORDS REVIEWED  Fall from slip on ice 1/7/2024, CT head at that point unremarkable.    HPI/ROS      Reba Nicole is a 59 y.o. female who presents after fall.  Patient has a history of falls.  Patient slipped and fell this Wednesday, after slipping on some spilled coffee that was on the floor left by her roommate, patient states that she fell and struck the front of her face.  She felt okay after this.  She did not lose consciousness.  She has not had any nausea or vomiting but has felt some general malaise after the fall.  She did not think much of it, but then started developing some bruising on her face this morning and was more concerned.  She is on Plavix for history of CAD talk to a physician who told her to come to the emergency department for further evaluation.  Patient denies any neck pain.  She denies any back pain.  She has no other complaints.  She denies any change in vision.    PAST MEDICAL HISTORY   has a past medical history of Arthritis, Back pain, Falls, and Hypertension.    SURGICAL HISTORY   has a past surgical history that includes other abdominal surgery and iliac angioplasty with stent (Left, 5/17/2020).    FAMILY HISTORY  No family history on file.    SOCIAL HISTORY  Social History     Tobacco Use    Smoking status: Every Day     Current packs/day: 0.50     Types: Cigarettes    Smokeless tobacco: Never   Vaping Use    Vaping Use: Never used   Substance and Sexual Activity    Alcohol use: Yes    Drug use: No    Sexual activity: Not on file       CURRENT MEDICATIONS  Home Medications    **Home medications have not yet been reviewed for this encounter**         ALLERGIES  Allergies   Allergen Reactions    Aspirin Rash and Swelling     Generalized rash, swelling in hands and feet       PHYSICAL EXAM  VITAL SIGNS: BP (!) 144/102   Pulse 83   Temp 35.9 °C (96.7 °F) (Temporal)   Resp 17   Ht 1.651 m (5' 5\")  "  Wt 94.4 kg (208 lb 1.8 oz)   LMP 10/13/2016   SpO2 97%   BMI 34.63 kg/m²    Physical Exam  Constitutional:       Appearance: Normal appearance.   HENT:      Head:      Comments: Small frontal cephalhematoma.  There is some minimal ecchymosis near the superior orbital ridge with some minimal tenderness here.  There is no associated strabismus or diplopia evoked on extraocular movements.  Extraocular movements are all intact.  Globe and conjunctiva are unremarkable.     Mouth/Throat:      Mouth: Mucous membranes are moist.   Pulmonary:      Effort: Pulmonary effort is normal.   Musculoskeletal:      Comments: CTL spine clear of any tenderness to palpation.   Neurological:      General: No focal deficit present.      Mental Status: She is alert and oriented to person, place, and time.   Psychiatric:         Mood and Affect: Mood normal.           DIAGNOSTIC STUDIES / PROCEDURES      RADIOLOGY  I have independently interpreted the diagnostic imaging associated with this visit and am waiting the final reading from the radiologist.   My preliminary interpretation is as follows: unremarkable head CT  Radiologist interpretation:   CT-HEAD W/O   Final Result      No acute process.               COURSE & MEDICAL DECISION MAKING      INITIAL ASSESSMENT, COURSE AND PLAN  Care Narrative: Well-appearing patient here after fall.  Patient is on Plavix and therefore will check CT to evaluate for any associated ICH.  Cervical spine cleared clinically Via Nexus cervical spine criteria.  Patient without any evidence of entrapment on exam.  Low suspicion of orbital blowout fracture.  CT head is unremarkable.  Patient discharged in good condition        DISPOSITION AND DISCUSSIONS    Escalation of care considered, and ultimately not performed: Cervical C-spine CT deferred as patient was ruled out via [facility] criteria    Decision tools and prescription drugs considered including, but not limited to: Cervical spine Nexus  criteria.    FINAL DIAGNOSIS    1. Fall, initial encounter    2. Traumatic injury of head, initial encounter

## 2024-01-20 NOTE — ED NOTES
Pt walked into the ER with chief complaint of a fall, striking left side of face.     Pt stated that she slipped on coffee, falling on her face this past Wednesday.    Pt wants to make sure it is nothing serious.   Pt mentioned that she does take blood thinners.

## 2024-02-14 ENCOUNTER — APPOINTMENT (OUTPATIENT)
Dept: RADIOLOGY | Facility: MEDICAL CENTER | Age: 59
End: 2024-02-14
Attending: EMERGENCY MEDICINE
Payer: MEDICAID

## 2024-02-14 ENCOUNTER — HOSPITAL ENCOUNTER (EMERGENCY)
Facility: MEDICAL CENTER | Age: 59
End: 2024-02-14
Attending: EMERGENCY MEDICINE
Payer: MEDICAID

## 2024-02-14 VITALS
SYSTOLIC BLOOD PRESSURE: 158 MMHG | BODY MASS INDEX: 35.3 KG/M2 | DIASTOLIC BLOOD PRESSURE: 82 MMHG | HEIGHT: 65 IN | OXYGEN SATURATION: 98 % | RESPIRATION RATE: 14 BRPM | WEIGHT: 211.86 LBS | TEMPERATURE: 98 F | HEART RATE: 88 BPM

## 2024-02-14 DIAGNOSIS — M25.562 ACUTE PAIN OF BOTH KNEES: ICD-10-CM

## 2024-02-14 DIAGNOSIS — M25.561 ACUTE PAIN OF BOTH KNEES: ICD-10-CM

## 2024-02-14 PROCEDURE — 73562 X-RAY EXAM OF KNEE 3: CPT | Mod: RT

## 2024-02-14 PROCEDURE — 99284 EMERGENCY DEPT VISIT MOD MDM: CPT

## 2024-02-14 PROCEDURE — 73562 X-RAY EXAM OF KNEE 3: CPT | Mod: LT

## 2024-02-14 NOTE — ED NOTES
Alejandro Triana is a 82 MD spoke to pt   per staff pt left just after MD spoke with her  no repeat VS were done and pt left w/out dischg instructions    [facility] [address]  

## 2024-02-14 NOTE — ED NOTES
Pt states she has chronic lower back pain that is getting worse. Pt states she had an interview for total disability and when she stood up she had back pain . Pt states she fell three times yesterday due to her back pain. Pt states she is now here for a knee injury in her left leg.

## 2024-02-14 NOTE — ED PROVIDER NOTES
ED Provider Note    CHIEF COMPLAINT  Chief Complaint   Patient presents with    GLF     States her back is hurting and she had 3 GLF, hurting bilateral knees.   denies LOC or hitting head.       Knee Pain     Bilateral knee pain L>R       HPI  Reba Nicole is a 59 y.o. female who presents valuation of bilateral knee pain, left greater than right after a series of 3 falls yesterday.  Patient notes she has chronic back pain and frequently falls because of this issue.  While there is no new pain in her back and no numbness, tingling, or focal motor weakness to lower extremities, she states she fell forward while getting her mail yesterday onto her knees.  She notes it has been painful to both kneecaps ever since.  She has been able to walk but is requesting a walker and x-rays of the knees.  She notes her back does not feel any different than normal and adds no bowel or bladder dysfunction, numbness, tingling, or pain radiating down the legs.  EXTERNAL RECORDS REVIEWED  None  ROS  Constitutional: No fevers or chills  Skin: No bruising, abrasions, or lacerations noted to knees.  HEENT: No sore throat, runny nose  Neck: No neck pain  Pulm: No shortness of breath, no cough  Gastrointestinal: No nausea, vomiting,  or abdominal pain.  Musculoskeletal: Bilateral knee pain.    Neurologic: No sensory or focal motor changes to extremities. No confusion or disorientation.  Heme: No bleeding or bruising problems.   Immuno: No hx of recurrent infections        LIMITATION TO HISTORY   None none  OUTSIDE HISTORIAN(S):  none        PAST FAM HISTORY  No family history on file.    PAST MEDICAL HISTORY   has a past medical history of Arthritis, Back pain, Falls, and Hypertension.    SOCIAL HISTORY  Social History     Tobacco Use    Smoking status: Every Day     Current packs/day: 0.50     Types: Cigarettes    Smokeless tobacco: Never   Vaping Use    Vaping Use: Never used   Substance and Sexual Activity    Alcohol use: Yes     "Drug use: No    Sexual activity: Not on file       SURGICAL HISTORY   has a past surgical history that includes other abdominal surgery and iliac angioplasty with stent (Left, 5/17/2020).    CURRENT MEDICATIONS  Home Medications    **Home medications have not yet been reviewed for this encounter**          ALLERGIES  Allergies   Allergen Reactions    Aspirin Rash and Swelling     Generalized rash, swelling in hands and feet       PHYSICAL EXAM  VITAL SIGNS: BP (!) 158/82   Pulse 88   Temp 36.7 °C (98 °F) (Temporal)   Resp 14   Ht 1.651 m (5' 5\")   Wt 96.1 kg (211 lb 13.8 oz)   LMP 10/13/2016   SpO2 98%   BMI 35.26 kg/m²    Gen: Alert in no apparent distress.  HEENT: No signs of trauma, Bilateral external ears normal, Nose normal. Conjunctiva normal, Non-icteric.   Cardiovascular: Regular rate and rhythm, no murmurs.  Capillary refill less than 3 seconds to all extremities, 2+ distal pulses.  Thorax & Lungs: Normal breath sounds, No respiratory distress, No wheezing bilateral chest rise  Abdomen: Bowel sounds normal, Soft, No tenderness, No masses, No pulsatile masses. No Guarding or rebound  Skin: Warm, Dry, No erythema, No rash noted to exposed areas.   Back: No bony tenderness, No CVA tenderness.   Extremities: Intact distal pulses, No edema.  Able to range knees to 90 degrees.  No difficulty ranging ankles and has excellent strength in plantar and dorsiflexion of both feet.  No distress or limitation to ranging hips.  No tense or tender muscle compartments to lower extremities.  No calf erythema or edema.  Neurologic: Alert , no facial droop, grossly normal coordination and strength  Psychiatric: Affect pleasant    INITIAL IMPRESSION  Patient arrives for evaluation of what is likely minor contusions to both knees.  Notable she has been able to ambulate since the event and her low back pain has not changed.  I do not feel her back pain needs further evaluation in the emergency department but I will get " imaging of her knees per her request.  I will get her a walker as well.  Provided her knee x-rays are reasonable, I do not feel any further imaging or laboratory evaluation will be necessary.  She does not have any symptoms to suggest cauda equina, epidural abscess, discitis, or osteomyelitis.    ED observation? No      I have independently interpreted this EKG    I have independently interpreted the diagnostic imaging associated with this visit and am waiting the final reading from the radiologist.   My preliminary interpretation is a follows: 3 view bilateral knees, no fractures or dislocations noted.  RADIOLOGY  DX-KNEE 3 VIEWS RIGHT   Final Result      No evidence of acute fracture or dislocation.      DX-KNEE 3 VIEWS LEFT   Final Result      No evidence of acute fracture or dislocation.              COURSE & MEDICAL DECISION MAKING  Pertinent Labs & Imaging studies reviewed. (See chart for details)  9:20 AM  Reevaluated patient bedside.  She is able to ambulate with a walker decently and does not appear distressed.  She is wanting to leave without discharge instructions however.  She states understanding of instructions as I expressed them to her verbally.  She will follow-up with her primary care physician regarding her chronic issues and return if the symptoms worsen or change in any way.      I have discussed management of the patient with the following physicians and LUC's:  none    Escalation of care considered, and ultimately not performed:blood analysis and diagnostic imaging    Barriers to care at this time, including but not limited to: .  None    Decision tools and Rx drugs considered including, but not limited to : None    Discussion of management with other Q or appropriate source(s): None    The patient will return for worsening symptoms and is stable at the time of discharge. The patient verbalizes understanding and will comply.    FINAL IMPRESSION  1. Acute pain of both knees         Electronically signed by: Aldo Connor M.D., 2/14/2024 7:54 AM

## 2024-02-14 NOTE — ED TRIAGE NOTES
"Chief Complaint   Patient presents with    GLF     States her back is hurting and she had 3 GLF, hurting bilateral knees.   denies LOC or hitting head.       Knee Pain     Bilateral knee pain L>R     BP (!) 158/82   Pulse 88   Temp 36.7 °C (98 °F) (Temporal)   Resp 14   Ht 1.651 m (5' 5\")   Wt 96.1 kg (211 lb 13.8 oz)   LMP 10/13/2016   SpO2 98%   BMI 35.26 kg/m²     "

## 2024-06-23 ENCOUNTER — APPOINTMENT (OUTPATIENT)
Dept: RADIOLOGY | Facility: MEDICAL CENTER | Age: 59
End: 2024-06-23
Attending: EMERGENCY MEDICINE
Payer: MEDICAID

## 2024-06-23 ENCOUNTER — HOSPITAL ENCOUNTER (EMERGENCY)
Facility: MEDICAL CENTER | Age: 59
End: 2024-06-23
Attending: EMERGENCY MEDICINE
Payer: MEDICAID

## 2024-06-23 VITALS
SYSTOLIC BLOOD PRESSURE: 116 MMHG | TEMPERATURE: 97.6 F | DIASTOLIC BLOOD PRESSURE: 62 MMHG | RESPIRATION RATE: 15 BRPM | HEART RATE: 91 BPM | OXYGEN SATURATION: 93 %

## 2024-06-23 DIAGNOSIS — M79.675 GREAT TOE PAIN, LEFT: ICD-10-CM

## 2024-06-23 DIAGNOSIS — I73.9 PERIPHERAL VASCULAR DISEASE (HCC): ICD-10-CM

## 2024-06-23 DIAGNOSIS — S93.509A SPRAIN OF TOE, INITIAL ENCOUNTER: ICD-10-CM

## 2024-06-23 PROCEDURE — 93926 LOWER EXTREMITY STUDY: CPT | Mod: LT

## 2024-06-23 PROCEDURE — 73660 X-RAY EXAM OF TOE(S): CPT | Mod: LT

## 2024-06-23 PROCEDURE — 96372 THER/PROPH/DIAG INJ SC/IM: CPT

## 2024-06-23 PROCEDURE — 99283 EMERGENCY DEPT VISIT LOW MDM: CPT

## 2024-06-23 PROCEDURE — 93926 LOWER EXTREMITY STUDY: CPT | Mod: 26,LT | Performed by: INTERNAL MEDICINE

## 2024-06-23 PROCEDURE — 700111 HCHG RX REV CODE 636 W/ 250 OVERRIDE (IP): Mod: JZ | Performed by: EMERGENCY MEDICINE

## 2024-06-23 RX ORDER — KETOROLAC TROMETHAMINE 15 MG/ML
15 INJECTION, SOLUTION INTRAMUSCULAR; INTRAVENOUS ONCE
Status: COMPLETED | OUTPATIENT
Start: 2024-06-23 | End: 2024-06-23

## 2024-06-23 RX ORDER — HYDROCODONE BITARTRATE AND ACETAMINOPHEN 5; 325 MG/1; MG/1
2 TABLET ORAL ONCE
Status: DISCONTINUED | OUTPATIENT
Start: 2024-06-23 | End: 2024-06-23

## 2024-06-23 RX ADMIN — KETOROLAC TROMETHAMINE 15 MG: 15 INJECTION, SOLUTION INTRAMUSCULAR; INTRAVENOUS at 15:45

## 2024-06-23 ASSESSMENT — PAIN DESCRIPTION - PAIN TYPE: TYPE: ACUTE PAIN

## 2024-06-23 NOTE — ED NOTES
----- Message from Millicent Colón sent at 4/20/2023  3:30 PM CDT -----  Regarding: office note  .Type:  Needs Medical Advice    Who Called: doris zamora -   Would the patient rather a call back or a response via [facility]?   Best Call Back Number: [phone] and fax [phone]    Additional Information:  Last visit note - also to discuss physical therapy        Pt declined [place] r/t does not have a ride home.

## 2024-06-23 NOTE — ED TRIAGE NOTES
Chief Complaint   Patient presents with    Toe Pain      Stubbed the pinky toe one week ago, complains of increased pain. Pt also states her great toe has been feeling numb and its purple.

## 2024-06-23 NOTE — ED NOTES
AO4, sitting up in gurney,  no acute distress noted.   Purple/ reddish discoloration to left great and 5th toes- pt denies any numbness to LLE, only has c/o a burning pain to left toes. 1+ left pedal pulse, difficult to palpate, 2+ R pedal pulse. No swelling to either lower extremity.   Xray now at bedside.   Pt updated that [facility] is on there way in.

## 2024-06-23 NOTE — ED PROVIDER NOTES
"ED Provider Note    CHIEF COMPLAINT  Chief Complaint   Patient presents with    Toe Pain       EXTERNAL RECORDS REVIEWED  Inpatient Notes operative note June 2020, left lower extremity ischemia secondary to acute occlusion of the left iliac artery by Dr. Gimenez, left common and external iliac artery stent, angioplasty    HPI/ROS  LIMITATION TO HISTORY   Select: : None  OUTSIDE HISTORIAN(S):  None    Reba Nicole is a 59 y.o. female who presents with 1 week of pain left fifth toe after \"stubbing the toe\", discoloration and pain to the right great toe.  She denies midfoot or ankle pain.  No knee or hip pain.  Patient states that time the pain feels burning.  Medical history significant for iliac artery stent secondary to peripheral vascular disease.  Patient took a dose of Plavix today however had been off the medication for weeks following back procedure also, pending left knee procedure for pain control.  Patient continues to smoke tobacco.  No chest pain.  No abdominal pain.  No acute numbness or weakness.    PAST MEDICAL HISTORY   has a past medical history of Arthritis, Back pain, Falls, and Hypertension.    SURGICAL HISTORY   has a past surgical history that includes other abdominal surgery and iliac angioplasty with stent (Left, 5/17/2020).    FAMILY HISTORY  History reviewed. No pertinent family history.    SOCIAL HISTORY  Social History     Tobacco Use    Smoking status: Every Day     Current packs/day: 0.50     Types: Cigarettes    Smokeless tobacco: Never   Vaping Use    Vaping status: Never Used   Substance and Sexual Activity    Alcohol use: Yes    Drug use: No    Sexual activity: Not on file       CURRENT MEDICATIONS  Home Medications    **Home medications have not yet been reviewed for this encounter**         ALLERGIES  Allergies   Allergen Reactions    Aspirin Rash and Swelling     Generalized rash, swelling in hands and feet       PHYSICAL EXAM  VITAL SIGNS: /73   Pulse 97   Temp 36.4 " °C (97.6 °F) (Temporal)   Resp 15   LMP 10/13/2016   SpO2 91%    Vascular: Pulses are nonpalpable in the left foot however the foot is warm with normal capillary refill.  Purplish discoloration of the left fifth toe and left first toe.  No open wounds.  The toes 2 through 4 are unaffected.  Musculoskeletal: Tender left fifth and first toe, no deformity.  Flexion extension of the toes are intact  Neurologic: Sensation is intact to all toes, and left foot.  Strength of the left foot is normal.  Skin: Purplish discoloration is noted above.  Skin color and warmth are normal left foot.  Psychiatric: Normal mood      RADIOLOGY/PROCEDURES   I have independently interpreted the diagnostic imaging associated with this visit and am waiting the final reading from the radiologist.   My preliminary interpretation is as follows: X-ray toes of the left foot negative for fracture    Radiologist interpretation:  US-EXTREMITY ARTERY LOWER UNILAT LEFT   Final Result      DX-TOE(S) 2+ LEFT   Final Result      No acute fracture or dislocation.      Ultrasounds are arterial left leg shows monophasic flow with normal runoff throughout the left leg.  Please refer to radiologist reading for further.    COURSE & MEDICAL DECISION MAKING    ASSESSMENT, COURSE AND PLAN  Care Narrative: Patient presents with left foot injury, differential including ischemic toes, traumatic injury with bruising, peripheral neuropathy.  The ultrasound of the left leg did not show acute ischemia, there was monophasic flow throughout the leg.  Clinically her left foot is warm and sensation is intact, no acute ischemia.  Patient is clinically appropriate for outpatient follow-up.  I have directed the patient to stop smoking.  She is directed to take her Plavix as prescribed.  I have referred her back to her vascular surgeon Dr. Gimenez for follow-up.        DISPOSITION AND DISCUSSIONS    Escalation of care considered, and ultimately not performed:acute inpatient  care management, however at this time, the patient is most appropriate for outpatient management    Barriers to care at this time, including but not limited to:  Patient continues to smoke tobacco .     Decision tools and prescription drugs considered including, but not limited to: Antibiotics were not indicated, no evidence of cellulitis or other bacterial infection .    FINAL DIAGNOSIS  1. Great toe pain, left    2. Sprain of toe, initial encounter    3. Peripheral vascular disease (HCC)           Electronically signed by: Gene Mcguire M.D., 6/23/2024 3:01 PM

## 2024-06-24 NOTE — DISCHARGE INSTRUCTIONS
Stop smoking  Please take your medication as prescribed  Follow-up with vascular surgery.  For worsening left leg or foot pain, numbness, weakness, go to [facility] emergency department for reevaluation

## 2024-06-26 ENCOUNTER — APPOINTMENT (OUTPATIENT)
Dept: RADIOLOGY | Facility: MEDICAL CENTER | Age: 59
End: 2024-06-26
Attending: EMERGENCY MEDICINE
Payer: MEDICAID

## 2024-06-26 ENCOUNTER — HOSPITAL ENCOUNTER (EMERGENCY)
Facility: MEDICAL CENTER | Age: 59
End: 2024-06-26
Attending: EMERGENCY MEDICINE
Payer: MEDICAID

## 2024-06-26 ENCOUNTER — HOSPITAL ENCOUNTER (INPATIENT)
Facility: MEDICAL CENTER | Age: 59
LOS: 1 days | End: 2024-06-27
Attending: EMERGENCY MEDICINE | Admitting: STUDENT IN AN ORGANIZED HEALTH CARE EDUCATION/TRAINING PROGRAM
Payer: MEDICAID

## 2024-06-26 VITALS
SYSTOLIC BLOOD PRESSURE: 121 MMHG | HEIGHT: 65 IN | HEART RATE: 67 BPM | DIASTOLIC BLOOD PRESSURE: 59 MMHG | RESPIRATION RATE: 13 BRPM | BODY MASS INDEX: 34.66 KG/M2 | WEIGHT: 208 LBS | OXYGEN SATURATION: 95 % | TEMPERATURE: 97.3 F

## 2024-06-26 DIAGNOSIS — M27.2 ABSCESS OF MANDIBLE: ICD-10-CM

## 2024-06-26 DIAGNOSIS — L03.211 FACIAL CELLULITIS: ICD-10-CM

## 2024-06-26 DIAGNOSIS — I74.3: ICD-10-CM

## 2024-06-26 DIAGNOSIS — I99.8 ISCHEMIC LEG: ICD-10-CM

## 2024-06-26 DIAGNOSIS — M79.605 PAIN OF LEFT LOWER EXTREMITY: ICD-10-CM

## 2024-06-26 DIAGNOSIS — K04.7 DENTAL ABSCESS: ICD-10-CM

## 2024-06-26 PROBLEM — L02.01 FACIAL ABSCESS: Status: ACTIVE | Noted: 2024-06-26

## 2024-06-26 PROBLEM — D72.829 LEUKOCYTOSIS: Status: ACTIVE | Noted: 2024-06-26

## 2024-06-26 PROBLEM — E87.20 LACTIC ACIDOSIS: Status: ACTIVE | Noted: 2024-06-26

## 2024-06-26 LAB
ALBUMIN SERPL BCP-MCNC: 3.8 G/DL (ref 3.2–4.9)
ALBUMIN/GLOB SERPL: 1.3 G/DL
ALP SERPL-CCNC: 116 U/L (ref 30–99)
ALT SERPL-CCNC: 21 U/L (ref 2–50)
ANION GAP SERPL CALC-SCNC: 14 MMOL/L (ref 7–16)
APTT PPP: 24.2 SEC (ref 24.7–36)
APTT PPP: 25.5 SEC (ref 24.7–36)
AST SERPL-CCNC: 14 U/L (ref 12–45)
BASOPHILS # BLD AUTO: 0.4 % (ref 0–1.8)
BASOPHILS # BLD: 0.05 K/UL (ref 0–0.12)
BILIRUB SERPL-MCNC: 0.5 MG/DL (ref 0.1–1.5)
BUN SERPL-MCNC: 13 MG/DL (ref 8–22)
CALCIUM ALBUM COR SERPL-MCNC: 9 MG/DL (ref 8.5–10.5)
CALCIUM SERPL-MCNC: 8.8 MG/DL (ref 8.4–10.2)
CHLORIDE SERPL-SCNC: 103 MMOL/L (ref 96–112)
CO2 SERPL-SCNC: 20 MMOL/L (ref 20–33)
CREAT SERPL-MCNC: 1.01 MG/DL (ref 0.5–1.4)
EOSINOPHIL # BLD AUTO: 0.04 K/UL (ref 0–0.51)
EOSINOPHIL NFR BLD: 0.3 % (ref 0–6.9)
ERYTHROCYTE [DISTWIDTH] IN BLOOD BY AUTOMATED COUNT: 46.5 FL (ref 35.9–50)
GFR SERPLBLD CREATININE-BSD FMLA CKD-EPI: 65 ML/MIN/1.73 M 2
GLOBULIN SER CALC-MCNC: 3 G/DL (ref 1.9–3.5)
GLUCOSE SERPL-MCNC: 135 MG/DL (ref 65–99)
HCT VFR BLD AUTO: 45.5 % (ref 37–47)
HGB BLD-MCNC: 15.4 G/DL (ref 12–16)
IMM GRANULOCYTES # BLD AUTO: 0.07 K/UL (ref 0–0.11)
IMM GRANULOCYTES NFR BLD AUTO: 0.5 % (ref 0–0.9)
INR PPP: 0.92 (ref 0.87–1.13)
INR PPP: 0.92 (ref 0.87–1.13)
LACTATE SERPL-SCNC: 0.8 MMOL/L (ref 0.5–2)
LACTATE SERPL-SCNC: 1.5 MMOL/L (ref 0.5–2)
LACTATE SERPL-SCNC: 2.4 MMOL/L (ref 0.5–2)
LYMPHOCYTES # BLD AUTO: 2 K/UL (ref 1–4.8)
LYMPHOCYTES NFR BLD: 14.4 % (ref 22–41)
MAGNESIUM SERPL-MCNC: 1.6 MG/DL (ref 1.5–2.5)
MCH RBC QN AUTO: 33.3 PG (ref 27–33)
MCHC RBC AUTO-ENTMCNC: 33.8 G/DL (ref 32.2–35.5)
MCV RBC AUTO: 98.5 FL (ref 81.4–97.8)
MONOCYTES # BLD AUTO: 1.09 K/UL (ref 0–0.85)
MONOCYTES NFR BLD AUTO: 7.9 % (ref 0–13.4)
NEUTROPHILS # BLD AUTO: 10.62 K/UL (ref 1.82–7.42)
NEUTROPHILS NFR BLD: 76.5 % (ref 44–72)
NRBC # BLD AUTO: 0 K/UL
NRBC BLD-RTO: 0 /100 WBC (ref 0–0.2)
PLATELET # BLD AUTO: 229 K/UL (ref 164–446)
PMV BLD AUTO: 10.8 FL (ref 9–12.9)
POTASSIUM SERPL-SCNC: 4.3 MMOL/L (ref 3.6–5.5)
PROT SERPL-MCNC: 6.8 G/DL (ref 6–8.2)
PROTHROMBIN TIME: 12.8 SEC (ref 12–14.6)
PROTHROMBIN TIME: 12.9 SEC (ref 12–14.6)
RBC # BLD AUTO: 4.62 M/UL (ref 4.2–5.4)
SODIUM SERPL-SCNC: 137 MMOL/L (ref 135–145)
UFH PPP CHRO-ACNC: <0.1 IU/ML
WBC # BLD AUTO: 13.9 K/UL (ref 4.8–10.8)

## 2024-06-26 PROCEDURE — 700117 HCHG RX CONTRAST REV CODE 255: Performed by: EMERGENCY MEDICINE

## 2024-06-26 PROCEDURE — 700105 HCHG RX REV CODE 258: Performed by: EMERGENCY MEDICINE

## 2024-06-26 PROCEDURE — 96365 THER/PROPH/DIAG IV INF INIT: CPT

## 2024-06-26 PROCEDURE — 700111 HCHG RX REV CODE 636 W/ 250 OVERRIDE (IP): Mod: JZ | Performed by: STUDENT IN AN ORGANIZED HEALTH CARE EDUCATION/TRAINING PROGRAM

## 2024-06-26 PROCEDURE — 700105 HCHG RX REV CODE 258: Performed by: STUDENT IN AN ORGANIZED HEALTH CARE EDUCATION/TRAINING PROGRAM

## 2024-06-26 PROCEDURE — 85730 THROMBOPLASTIN TIME PARTIAL: CPT

## 2024-06-26 PROCEDURE — 80053 COMPREHEN METABOLIC PANEL: CPT

## 2024-06-26 PROCEDURE — 99406 BEHAV CHNG SMOKING 3-10 MIN: CPT | Performed by: STUDENT IN AN ORGANIZED HEALTH CARE EDUCATION/TRAINING PROGRAM

## 2024-06-26 PROCEDURE — 75635 CT ANGIO ABDOMINAL ARTERIES: CPT

## 2024-06-26 PROCEDURE — 99406 BEHAV CHNG SMOKING 3-10 MIN: CPT

## 2024-06-26 PROCEDURE — 36415 COLL VENOUS BLD VENIPUNCTURE: CPT

## 2024-06-26 PROCEDURE — 99285 EMERGENCY DEPT VISIT HI MDM: CPT

## 2024-06-26 PROCEDURE — 85025 COMPLETE CBC W/AUTO DIFF WBC: CPT

## 2024-06-26 PROCEDURE — 87040 BLOOD CULTURE FOR BACTERIA: CPT

## 2024-06-26 PROCEDURE — 700101 HCHG RX REV CODE 250: Performed by: EMERGENCY MEDICINE

## 2024-06-26 PROCEDURE — 96365 THER/PROPH/DIAG IV INF INIT: CPT | Mod: XU

## 2024-06-26 PROCEDURE — 99284 EMERGENCY DEPT VISIT MOD MDM: CPT

## 2024-06-26 PROCEDURE — 85520 HEPARIN ASSAY: CPT

## 2024-06-26 PROCEDURE — 83605 ASSAY OF LACTIC ACID: CPT | Mod: 91

## 2024-06-26 PROCEDURE — 83605 ASSAY OF LACTIC ACID: CPT

## 2024-06-26 PROCEDURE — 96375 TX/PRO/DX INJ NEW DRUG ADDON: CPT | Mod: XU

## 2024-06-26 PROCEDURE — 93922 UPR/L XTREMITY ART 2 LEVELS: CPT

## 2024-06-26 PROCEDURE — 83735 ASSAY OF MAGNESIUM: CPT

## 2024-06-26 PROCEDURE — 70487 CT MAXILLOFACIAL W/DYE: CPT

## 2024-06-26 PROCEDURE — 99223 1ST HOSP IP/OBS HIGH 75: CPT | Mod: 25 | Performed by: STUDENT IN AN ORGANIZED HEALTH CARE EDUCATION/TRAINING PROGRAM

## 2024-06-26 PROCEDURE — 700111 HCHG RX REV CODE 636 W/ 250 OVERRIDE (IP): Performed by: EMERGENCY MEDICINE

## 2024-06-26 PROCEDURE — 700102 HCHG RX REV CODE 250 W/ 637 OVERRIDE(OP): Performed by: STUDENT IN AN ORGANIZED HEALTH CARE EDUCATION/TRAINING PROGRAM

## 2024-06-26 PROCEDURE — 41800 DRAINAGE OF GUM LESION: CPT

## 2024-06-26 PROCEDURE — 85610 PROTHROMBIN TIME: CPT | Mod: 91

## 2024-06-26 PROCEDURE — 770006 HCHG ROOM/CARE - MED/SURG/GYN SEMI*

## 2024-06-26 PROCEDURE — A9270 NON-COVERED ITEM OR SERVICE: HCPCS | Performed by: STUDENT IN AN ORGANIZED HEALTH CARE EDUCATION/TRAINING PROGRAM

## 2024-06-26 RX ORDER — PROMETHAZINE HYDROCHLORIDE 25 MG/1
12.5-25 TABLET ORAL EVERY 4 HOURS PRN
Status: DISCONTINUED | OUTPATIENT
Start: 2024-06-26 | End: 2024-06-27 | Stop reason: HOSPADM

## 2024-06-26 RX ORDER — ENOXAPARIN SODIUM 100 MG/ML
40 INJECTION SUBCUTANEOUS DAILY
Status: DISCONTINUED | OUTPATIENT
Start: 2024-06-27 | End: 2024-06-27 | Stop reason: HOSPADM

## 2024-06-26 RX ORDER — SODIUM CHLORIDE, SODIUM LACTATE, POTASSIUM CHLORIDE, CALCIUM CHLORIDE 600; 310; 30; 20 MG/100ML; MG/100ML; MG/100ML; MG/100ML
1000 INJECTION, SOLUTION INTRAVENOUS ONCE
Status: COMPLETED | OUTPATIENT
Start: 2024-06-26 | End: 2024-06-26

## 2024-06-26 RX ORDER — LEVOTHYROXINE SODIUM 0.1 MG/1
100 TABLET ORAL EVERY EVENING
Status: DISCONTINUED | OUTPATIENT
Start: 2024-06-26 | End: 2024-06-27 | Stop reason: HOSPADM

## 2024-06-26 RX ORDER — ACETAMINOPHEN 325 MG/1
650 TABLET ORAL EVERY 6 HOURS PRN
Status: DISCONTINUED | OUTPATIENT
Start: 2024-06-26 | End: 2024-06-27 | Stop reason: HOSPADM

## 2024-06-26 RX ORDER — CLINDAMYCIN HYDROCHLORIDE 300 MG/1
300 CAPSULE ORAL 4 TIMES DAILY
Status: ON HOLD | COMMUNITY
End: 2024-06-27

## 2024-06-26 RX ORDER — PREGABALIN 150 MG/1
300 CAPSULE ORAL 2 TIMES DAILY
Status: DISCONTINUED | OUTPATIENT
Start: 2024-06-26 | End: 2024-06-27 | Stop reason: HOSPADM

## 2024-06-26 RX ORDER — LIDOCAINE HYDROCHLORIDE AND EPINEPHRINE 10; 10 MG/ML; UG/ML
10 INJECTION, SOLUTION INFILTRATION; PERINEURAL ONCE
Status: COMPLETED | OUTPATIENT
Start: 2024-06-26 | End: 2024-06-26

## 2024-06-26 RX ORDER — CLOPIDOGREL BISULFATE 75 MG/1
75 TABLET ORAL DAILY
Status: DISCONTINUED | OUTPATIENT
Start: 2024-06-27 | End: 2024-06-27 | Stop reason: HOSPADM

## 2024-06-26 RX ORDER — PROMETHAZINE HYDROCHLORIDE 25 MG/1
12.5-25 SUPPOSITORY RECTAL EVERY 4 HOURS PRN
Status: DISCONTINUED | OUTPATIENT
Start: 2024-06-26 | End: 2024-06-27 | Stop reason: HOSPADM

## 2024-06-26 RX ORDER — HEPARIN SODIUM 1000 [USP'U]/ML
80 INJECTION, SOLUTION INTRAVENOUS; SUBCUTANEOUS ONCE
Status: COMPLETED | OUTPATIENT
Start: 2024-06-26 | End: 2024-06-26

## 2024-06-26 RX ORDER — ONDANSETRON 4 MG/1
4 TABLET, ORALLY DISINTEGRATING ORAL EVERY 4 HOURS PRN
Status: DISCONTINUED | OUTPATIENT
Start: 2024-06-26 | End: 2024-06-27 | Stop reason: HOSPADM

## 2024-06-26 RX ORDER — CYCLOBENZAPRINE HCL 10 MG
5-10 TABLET ORAL 3 TIMES DAILY PRN
Status: DISCONTINUED | OUTPATIENT
Start: 2024-06-26 | End: 2024-06-27 | Stop reason: HOSPADM

## 2024-06-26 RX ORDER — SODIUM CHLORIDE 9 MG/ML
INJECTION, SOLUTION INTRAVENOUS CONTINUOUS
Status: ACTIVE | OUTPATIENT
Start: 2024-06-26 | End: 2024-06-27

## 2024-06-26 RX ORDER — LISINOPRIL 10 MG/1
30 TABLET ORAL DAILY
Status: DISCONTINUED | OUTPATIENT
Start: 2024-06-27 | End: 2024-06-27 | Stop reason: HOSPADM

## 2024-06-26 RX ORDER — ROSUVASTATIN CALCIUM 20 MG/1
20 TABLET, COATED ORAL EVERY EVENING
Status: DISCONTINUED | OUTPATIENT
Start: 2024-06-26 | End: 2024-06-27 | Stop reason: HOSPADM

## 2024-06-26 RX ORDER — ONDANSETRON 2 MG/ML
4 INJECTION INTRAMUSCULAR; INTRAVENOUS EVERY 4 HOURS PRN
Status: DISCONTINUED | OUTPATIENT
Start: 2024-06-26 | End: 2024-06-27 | Stop reason: HOSPADM

## 2024-06-26 RX ORDER — HEPARIN SODIUM 5000 [USP'U]/100ML
0-30 INJECTION, SOLUTION INTRAVENOUS CONTINUOUS
Status: DISCONTINUED | OUTPATIENT
Start: 2024-06-26 | End: 2024-06-26 | Stop reason: HOSPADM

## 2024-06-26 RX ORDER — HEPARIN SODIUM 1000 [USP'U]/ML
40 INJECTION, SOLUTION INTRAVENOUS; SUBCUTANEOUS PRN
Status: DISCONTINUED | OUTPATIENT
Start: 2024-06-26 | End: 2024-06-26 | Stop reason: HOSPADM

## 2024-06-26 RX ORDER — HYDROCODONE BITARTRATE AND ACETAMINOPHEN 5; 325 MG/1; MG/1
1 TABLET ORAL 2 TIMES DAILY PRN
Status: DISCONTINUED | OUTPATIENT
Start: 2024-06-26 | End: 2024-06-27 | Stop reason: HOSPADM

## 2024-06-26 RX ORDER — PROCHLORPERAZINE EDISYLATE 5 MG/ML
5-10 INJECTION INTRAMUSCULAR; INTRAVENOUS EVERY 4 HOURS PRN
Status: DISCONTINUED | OUTPATIENT
Start: 2024-06-26 | End: 2024-06-27 | Stop reason: HOSPADM

## 2024-06-26 RX ADMIN — LIDOCAINE HYDROCHLORIDE AND EPINEPHRINE 10 ML: 10; 10 INJECTION, SOLUTION INFILTRATION; PERINEURAL at 15:15

## 2024-06-26 RX ADMIN — HEPARIN SODIUM 5800 UNITS: 1000 INJECTION INTRAVENOUS; SUBCUTANEOUS at 17:05

## 2024-06-26 RX ADMIN — SODIUM CHLORIDE, POTASSIUM CHLORIDE, SODIUM LACTATE AND CALCIUM CHLORIDE 1000 ML: 600; 310; 30; 20 INJECTION, SOLUTION INTRAVENOUS at 14:49

## 2024-06-26 RX ADMIN — ROSUVASTATIN CALCIUM 20 MG: 20 TABLET, FILM COATED ORAL at 20:42

## 2024-06-26 RX ADMIN — LEVOTHYROXINE SODIUM 100 MCG: 0.1 TABLET ORAL at 20:42

## 2024-06-26 RX ADMIN — AMPICILLIN AND SULBACTAM 3 G: 1; 2 INJECTION, POWDER, FOR SOLUTION INTRAMUSCULAR; INTRAVENOUS at 20:43

## 2024-06-26 RX ADMIN — IOHEXOL 100 ML: 350 INJECTION, SOLUTION INTRAVENOUS at 16:53

## 2024-06-26 RX ADMIN — AMPICILLIN AND SULBACTAM 3 G: 2; 1 INJECTION, POWDER, FOR SOLUTION INTRAMUSCULAR; INTRAVENOUS at 14:49

## 2024-06-26 RX ADMIN — IOHEXOL 75 ML: 350 INJECTION, SOLUTION INTRAVENOUS at 16:49

## 2024-06-26 RX ADMIN — SODIUM CHLORIDE: 9 INJECTION, SOLUTION INTRAVENOUS at 20:44

## 2024-06-26 RX ADMIN — SODIUM CHLORIDE, POTASSIUM CHLORIDE, SODIUM LACTATE AND CALCIUM CHLORIDE 1000 ML: 600; 310; 30; 20 INJECTION, SOLUTION INTRAVENOUS at 16:30

## 2024-06-26 RX ADMIN — PREGABALIN 300 MG: 150 CAPSULE ORAL at 20:42

## 2024-06-26 ASSESSMENT — FIBROSIS 4 INDEX
FIB4 SCORE: 0.76
FIB4 SCORE: 0.76

## 2024-06-26 ASSESSMENT — ENCOUNTER SYMPTOMS
INSOMNIA: 0
EYE DISCHARGE: 0
SPEECH CHANGE: 0
BACK PAIN: 0
DIZZINESS: 0
NERVOUS/ANXIOUS: 0
WEAKNESS: 0
NAUSEA: 0
CHILLS: 0
TREMORS: 0
HALLUCINATIONS: 0
COUGH: 0
ORTHOPNEA: 0
VOMITING: 0
HEMOPTYSIS: 0
PALPITATIONS: 0
SHORTNESS OF BREATH: 0
DIARRHEA: 0
FEVER: 0
HEADACHES: 0
FOCAL WEAKNESS: 0
SENSORY CHANGE: 0
SPUTUM PRODUCTION: 0
SINUS PAIN: 0
DOUBLE VISION: 0
EYE PAIN: 0
TINGLING: 1
NECK PAIN: 0
ABDOMINAL PAIN: 0

## 2024-06-26 ASSESSMENT — PAIN DESCRIPTION - PAIN TYPE: TYPE: ACUTE PAIN

## 2024-06-26 ASSESSMENT — LIFESTYLE VARIABLES: SUBSTANCE_ABUSE: 0

## 2024-06-26 NOTE — ED TRIAGE NOTES
Chief Complaint   Patient presents with    Leg Pain     LLE cool, dusky, diminished pulses. Known Hx of blood clots. Prescribed Plavix, reports has missed multiple doses recently and cannot keep track of when her last dose was. Continues to smoke.     Jaw Pain     Right mandible swelling. Reports known infection and has been Rx abx.    BP 91/49   Pulse 83   Temp 36.3 °C (97.3 °F) (Temporal)   Resp 13   LMP 10/13/2016   SpO2 93%

## 2024-06-26 NOTE — ED PROVIDER NOTES
ED PHYSICIAN NOTE    CHIEF COMPLAINT  Chief Complaint   Patient presents with    Leg Pain     LLE cool, dusky, diminished pulses. Known Hx of blood clots. Prescribed Plavix, reports has missed multiple doses recently and cannot keep track of when her last dose was. Continues to smoke.     Jaw Pain     Right mandible swelling. Reports known infection and has been Rx abx.        EXTERNAL RECORDS REVIEWED  Patient notes: Patient had arterial occlusion of the left lower extremity.  Seen by Dr. Gimenez.  Underwent angioplasty and stent placement.    Patient was in the emergency department 3 days ago with left foot pain.  Concerned about vascular issue.  She has not been terribly compliant with her prescribed Plavix.    HPI/ROS    OUTSIDE HISTORIAN(S):  EMS patient called paramedics for left leg pain.    Reba Nicole is a 59 y.o. female who presents chief complaint of left leg pain.  She has had off-and-on issues with leg pain.  Has known vascular disease and has a stent in the left common and external iliac artery as well as in the superficial femoral and profunda femoris artery.  Pain seems to be getting worse lately.  Had an episode of pain a few days ago.  Was doing okay for the next couple days but then last night was woken up from sleep with discomfort in the foot burning pain radiates up into the back.  She does suffer from sciatica but this feels different.  Denies weakness in the extremities.  She has not had any trauma.  Reports she does not always take her Plavix as directed.    Additionally she reports she started to have swelling in the right mandible prescribed clindamycin by primary.  Reports she has been taking.  Reports swelling seems somewhat improved    PAST MEDICAL HISTORY  Past Medical History:   Diagnosis Date    Arthritis     Back pain     Falls     Hypertension        SOCIAL HISTORY  Social History     Tobacco Use    Smoking status: Every Day     Current packs/day: 0.50     Types: Cigarettes     Smokeless tobacco: Never   Vaping Use    Vaping status: Never Used   Substance Use Topics    Alcohol use: Yes    Drug use: No       CURRENT MEDICATIONS  Home Medications       Reviewed by Compa Archibald R.N. (Registered Nurse) on 06/26/24 at 1410  Med List Status: Not Addressed     Medication Last Dose Status   acetaminophen (TYLENOL) 500 MG Tab  Active   clopidogrel (PLAVIX) 75 MG Tab  Active   cyclobenzaprine (FLEXERIL) 5 mg tablet  Active   HYDROcodone-acetaminophen (NORCO) 7.5-325 MG tab  Active   levothyroxine (SYNTHROID) 100 MCG Tab  Active   lisinopril (PRINIVIL) 30 MG tablet  Active   pregabalin (LYRICA) 300 MG capsule  Active   rosuvastatin (CRESTOR) 20 MG Tab  Active                  Audit from Redirected Encounters    **Home medications have not yet been reviewed for this encounter**         ALLERGIES  Allergies   Allergen Reactions    Aspirin Rash and Swelling     Generalized rash, swelling in hands and feet       PHYSICAL EXAM  VITAL SIGNS: BP 91/49   Pulse 83   Temp 36.3 °C (97.3 °F) (Temporal)   Resp 13   LMP 10/13/2016   SpO2 93%    Constitutional: Awake and alert  HENT: Right mandibular swelling.  Pointing abscess adjacent to the right mandibular premolar.  No facial cellulitis  Eyes: Normal inspection  Neck: Grossly normal range of motion.  Cardiovascular: Normal heart rate, Normal rhythm.  Symmetric peripheral pulses.   Thorax & Lungs: No respiratory distress, No wheezing, No rales, No rhonchi, No chest tenderness.   Abdomen: Bowel sounds normal, soft, non-distended, nontender, no mass  Skin: No obvious rash.  Extremities: Discoloration of the left foot versus the right.  Cannot palpate posterior tibial pulse.  Neurologic: Normal sensory and motor bilateral lower extremities    DIAGNOSTIC STUDIES / PROCEDURES  LABS/EKG  Results for orders placed or performed during the hospital encounter of 06/26/24   CBC WITH DIFFERENTIAL   Result Value Ref Range    WBC 13.9 (H) 4.8 - 10.8 K/uL    RBC  4.62 4.20 - 5.40 M/uL    Hemoglobin 15.4 12.0 - 16.0 g/dL    Hematocrit 45.5 37.0 - 47.0 %    MCV 98.5 (H) 81.4 - 97.8 fL    MCH 33.3 (H) 27.0 - 33.0 pg    MCHC 33.8 32.2 - 35.5 g/dL    RDW 46.5 35.9 - 50.0 fL    Platelet Count 229 164 - 446 K/uL    MPV 10.8 9.0 - 12.9 fL    Neutrophils-Polys 76.50 (H) 44.00 - 72.00 %    Lymphocytes 14.40 (L) 22.00 - 41.00 %    Monocytes 7.90 0.00 - 13.40 %    Eosinophils 0.30 0.00 - 6.90 %    Basophils 0.40 0.00 - 1.80 %    Immature Granulocytes 0.50 0.00 - 0.90 %    Nucleated RBC 0.00 0.00 - 0.20 /100 WBC    Neutrophils (Absolute) 10.62 (H) 1.82 - 7.42 K/uL    Lymphs (Absolute) 2.00 1.00 - 4.80 K/uL    Monos (Absolute) 1.09 (H) 0.00 - 0.85 K/uL    Eos (Absolute) 0.04 0.00 - 0.51 K/uL    Baso (Absolute) 0.05 0.00 - 0.12 K/uL    Immature Granulocytes (abs) 0.07 0.00 - 0.11 K/uL    NRBC (Absolute) 0.00 K/uL   COMP METABOLIC PANEL   Result Value Ref Range    Sodium 137 135 - 145 mmol/L    Potassium 4.3 3.6 - 5.5 mmol/L    Chloride 103 96 - 112 mmol/L    Co2 20 20 - 33 mmol/L    Anion Gap 14.0 7.0 - 16.0    Glucose 135 (H) 65 - 99 mg/dL    Bun 13 8 - 22 mg/dL    Creatinine 1.01 0.50 - 1.40 mg/dL    Calcium 8.8 8.4 - 10.2 mg/dL    Correct Calcium 9.0 8.5 - 10.5 mg/dL    AST(SGOT) 14 12 - 45 U/L    ALT(SGPT) 21 2 - 50 U/L    Alkaline Phosphatase 116 (H) 30 - 99 U/L    Total Bilirubin 0.5 0.1 - 1.5 mg/dL    Albumin 3.8 3.2 - 4.9 g/dL    Total Protein 6.8 6.0 - 8.2 g/dL    Globulin 3.0 1.9 - 3.5 g/dL    A-G Ratio 1.3 g/dL   APTT   Result Value Ref Range    APTT 24.2 (L) 24.7 - 36.0 sec   PROTHROMBIN TIME (INR)   Result Value Ref Range    PT 12.8 12.0 - 14.6 sec    INR 0.92 0.87 - 1.13   Lactic Acid   Result Value Ref Range    Lactic Acid 2.4 (H) 0.5 - 2.0 mmol/L   ESTIMATED GFR   Result Value Ref Range    GFR (CKD-EPI) 65 >60 mL/min/1.73 m 2        Rhythm strip interpretation-sinus rhythm    RADIOLOGY  I have independently interpreted the diagnostic imaging associated with this visit and  am waiting the final reading from the radiologist.   My preliminary interpretation is as follows: CTA aorta demonstrates stent occlusion there does appear to be reconstitution of flow    Radiologist interpretation:   US-EXTREMITY ARTERY LOWER UNILAT W/ABIGAIL (COMBO) LEFT         US-ABIGAIL SINGLE LEVEL BILAT   Final Result      US-AORTA/ILIACS DUPLEX LIMITED    (Results Pending)   CT-CTA AORTA-RO WITH & W/O-POST PROCESS    (Results Pending)   CT-MAXILLOFACIAL WITH PLUS RECONS    (Results Pending)         Incision and Drainage Procedure Note    Indication: Abscess    Procedure: The patient was positioned appropriately.  Lidocaine with epinephrine was applied topically.  Next 2 cc of lidocaine with epinephrine was injected over area of maximal fluctuance.  Stab incision was made.  Large volume of frankly purulent material was released.    The patient tolerated the procedure well.    Complications: None      COURSE & MEDICAL DECISION MAKING    INITIAL ASSESSMENT, COURSE AND PLAN  Care Narrative: Patient presents with left lower extremity pain.  Known vascular disease.  Cannot palpate pulses.  There is discoloration of the left foot when compared to the right.  Normal motor and sensory.  Ordered repeat ultrasounds.  She has a dental abscess.  Ordered Unasyn and laboratory data.  Patient will need incision and drainage.  Ordered 1 L IV fluid for blood pressure.  No hypotension in ED.  Will hold on 30 cc/kg fluid bolus for fear of volume overload.    Laboratory data as noted above.  Incision and drainage was performed with good results.    Ultrasound of the left leg was completed.  Doppler waveforms are monophasic.  No Doppler signal from posterior tibial artery dorsalis pedis artery and first digit PPG flattened.  No specific occlusion was identified.  I ordered aorta CT scan with runoff.  Will also obtain maxillofacial CT.    I have concern that patient could be septic with hypotension from dental abscess or at least low blood  pressure is causing left leg ischemia.  She will need to be admitted to hospital.  I consulted Dr. Kumar.  Given concern for ischemic leg and significant illness advised admission to hospitalist service with antibiotics.  Recommended consult on-call oral surgery in AM for continuity of care.  There is no vascular surgery available at [facility].  Patient will need to be transferred.  I paged Dr. Gimenez to discuss.  He is in OR.    On my review CT scan of aorta shows stent occlusion.  I ordered heparin.  Patient to transfer to [facility] for further assessment and vascular surgery consultation.    I discussed case with Dr. Mendoza who accepts transfer to ER    DISPOSITION AND DISCUSSIONS    Discussion of management with other [facility] or appropriate source(s): Pharmacy for heparin        FINAL IMPRESSION  1.  Left leg ischemia  2.  Dental abscess  3.  Prehospital hypotension  4.  Incision and drainage of dental abscess    CRITICAL CARE  The very real possibilty of a deterioration of this patient's condition required the highest level of my preparedness for sudden, emergent intervention.  I provided critical care services, which included medication orders, frequent reevaluations of the patient's condition and response to treatment, ordering and reviewing test results, and discussing the case with various consultants.  The critical care time associated with the care of the patient was 45 minutes. Review chart for interventions. This time is exclusive of any other billable procedures.       This dictation was created using voice recognition software. The accuracy of the dictation is limited to the abilities of the software. I expect there may be some errors of grammar and possibly content. The nursing notes were reviewed and certain aspects of this information were incorporated into this note.    Electronically signed by: Clark Chamberlain M.D., 6/26/2024

## 2024-06-26 NOTE — ED NOTES
Medication history reviewed with pt. Med rec is complete.  Allergies reviewed, per pt    Pt reports that she started an antibiotic, not sure the name or strength.  Called Syd @ [phone] to verify antibiotic     Patient has had outpatient antibiotics in the last 30 days, pt started CLINDAMYCIN 300MG on 6/25/2024 for 7 day course.  Last dose was taken today at 0600.     Pt is not on any anticoagulants

## 2024-06-27 ENCOUNTER — PHARMACY VISIT (OUTPATIENT)
Dept: PHARMACY | Facility: MEDICAL CENTER | Age: 59
End: 2024-06-27
Payer: COMMERCIAL

## 2024-06-27 VITALS
WEIGHT: 207.89 LBS | HEART RATE: 71 BPM | BODY MASS INDEX: 34.64 KG/M2 | TEMPERATURE: 96.9 F | DIASTOLIC BLOOD PRESSURE: 66 MMHG | SYSTOLIC BLOOD PRESSURE: 106 MMHG | RESPIRATION RATE: 16 BRPM | HEIGHT: 65 IN | OXYGEN SATURATION: 93 %

## 2024-06-27 LAB
BACTERIA BLD CULT: NORMAL
BACTERIA BLD CULT: NORMAL
SIGNIFICANT IND 70042: NORMAL
SIGNIFICANT IND 70042: NORMAL
SITE SITE: NORMAL
SITE SITE: NORMAL
SOURCE SOURCE: NORMAL
SOURCE SOURCE: NORMAL

## 2024-06-27 PROCEDURE — 700111 HCHG RX REV CODE 636 W/ 250 OVERRIDE (IP): Mod: JZ | Performed by: STUDENT IN AN ORGANIZED HEALTH CARE EDUCATION/TRAINING PROGRAM

## 2024-06-27 PROCEDURE — 99239 HOSP IP/OBS DSCHRG MGMT >30: CPT | Performed by: INTERNAL MEDICINE

## 2024-06-27 PROCEDURE — 700102 HCHG RX REV CODE 250 W/ 637 OVERRIDE(OP): Performed by: STUDENT IN AN ORGANIZED HEALTH CARE EDUCATION/TRAINING PROGRAM

## 2024-06-27 PROCEDURE — A9270 NON-COVERED ITEM OR SERVICE: HCPCS | Performed by: STUDENT IN AN ORGANIZED HEALTH CARE EDUCATION/TRAINING PROGRAM

## 2024-06-27 PROCEDURE — 700105 HCHG RX REV CODE 258: Performed by: STUDENT IN AN ORGANIZED HEALTH CARE EDUCATION/TRAINING PROGRAM

## 2024-06-27 PROCEDURE — RXMED WILLOW AMBULATORY MEDICATION CHARGE: Performed by: INTERNAL MEDICINE

## 2024-06-27 RX ORDER — AMOXICILLIN AND CLAVULANATE POTASSIUM 875; 125 MG/1; MG/1
1 TABLET, FILM COATED ORAL 2 TIMES DAILY
Qty: 20 TABLET | Refills: 0 | Status: ACTIVE | OUTPATIENT
Start: 2024-06-27 | End: 2024-07-07

## 2024-06-27 RX ADMIN — PREGABALIN 300 MG: 150 CAPSULE ORAL at 06:16

## 2024-06-27 RX ADMIN — HYDROCODONE BITARTRATE AND ACETAMINOPHEN 1 TABLET: 5; 325 TABLET ORAL at 08:30

## 2024-06-27 RX ADMIN — NICOTINE 7 MG: 7 PATCH TRANSDERMAL at 06:15

## 2024-06-27 RX ADMIN — CLOPIDOGREL BISULFATE 75 MG: 75 TABLET ORAL at 06:16

## 2024-06-27 RX ADMIN — LISINOPRIL 30 MG: 10 TABLET ORAL at 06:15

## 2024-06-27 RX ADMIN — AMPICILLIN AND SULBACTAM 3 G: 1; 2 INJECTION, POWDER, FOR SOLUTION INTRAMUSCULAR; INTRAVENOUS at 06:17

## 2024-06-27 SDOH — ECONOMIC STABILITY: TRANSPORTATION INSECURITY
IN THE PAST 12 MONTHS, HAS LACK OF RELIABLE TRANSPORTATION KEPT YOU FROM MEDICAL APPOINTMENTS, MEETINGS, WORK OR FROM GETTING THINGS NEEDED FOR DAILY LIVING?: YES

## 2024-06-27 SDOH — ECONOMIC STABILITY: TRANSPORTATION INSECURITY
IN THE PAST 12 MONTHS, HAS THE LACK OF TRANSPORTATION KEPT YOU FROM MEDICAL APPOINTMENTS OR FROM GETTING MEDICATIONS?: YES

## 2024-06-27 ASSESSMENT — SOCIAL DETERMINANTS OF HEALTH (SDOH)
WITHIN THE LAST YEAR, HAVE TO BEEN RAPED OR FORCED TO HAVE ANY KIND OF SEXUAL ACTIVITY BY YOUR PARTNER OR EX-PARTNER?: NO
WITHIN THE PAST 12 MONTHS, THE FOOD YOU BOUGHT JUST DIDN'T LAST AND YOU DIDN'T HAVE MONEY TO GET MORE: SOMETIMES TRUE
WITHIN THE LAST YEAR, HAVE YOU BEEN HUMILIATED OR EMOTIONALLY ABUSED IN OTHER WAYS BY YOUR PARTNER OR EX-PARTNER?: NO
WITHIN THE LAST YEAR, HAVE YOU BEEN KICKED, HIT, SLAPPED, OR OTHERWISE PHYSICALLY HURT BY YOUR PARTNER OR EX-PARTNER?: NO
WITHIN THE PAST 12 MONTHS, YOU WORRIED THAT YOUR FOOD WOULD RUN OUT BEFORE YOU GOT THE MONEY TO BUY MORE: SOMETIMES TRUE
WITHIN THE LAST YEAR, HAVE YOU BEEN AFRAID OF YOUR PARTNER OR EX-PARTNER?: NO
IN THE PAST 12 MONTHS, HAS THE ELECTRIC, GAS, OIL, OR WATER COMPANY THREATENED TO SHUT OFF SERVICE IN YOUR HOME?: NO

## 2024-06-27 ASSESSMENT — COGNITIVE AND FUNCTIONAL STATUS - GENERAL
CLIMB 3 TO 5 STEPS WITH RAILING: A LITTLE
TURNING FROM BACK TO SIDE WHILE IN FLAT BAD: A LITTLE
DRESSING REGULAR LOWER BODY CLOTHING: A LITTLE
MOVING TO AND FROM BED TO CHAIR: A LITTLE
STANDING UP FROM CHAIR USING ARMS: A LITTLE
WALKING IN HOSPITAL ROOM: A LITTLE
SUGGESTED CMS G CODE MODIFIER MOBILITY: CK
DRESSING REGULAR UPPER BODY CLOTHING: A LITTLE
SUGGESTED CMS G CODE MODIFIER DAILY ACTIVITY: CJ
MOVING FROM LYING ON BACK TO SITTING ON SIDE OF FLAT BED: A LITTLE
MOBILITY SCORE: 18
DAILY ACTIVITIY SCORE: 22

## 2024-06-27 ASSESSMENT — PATIENT HEALTH QUESTIONNAIRE - PHQ9
1. LITTLE INTEREST OR PLEASURE IN DOING THINGS: NOT AT ALL
2. FEELING DOWN, DEPRESSED, IRRITABLE, OR HOPELESS: NOT AT ALL
SUM OF ALL RESPONSES TO PHQ9 QUESTIONS 1 AND 2: 0

## 2024-06-27 ASSESSMENT — LIFESTYLE VARIABLES
TOTAL SCORE: 0
AVERAGE NUMBER OF DAYS PER WEEK YOU HAVE A DRINK CONTAINING ALCOHOL: 2
EVER HAD A DRINK FIRST THING IN THE MORNING TO STEADY YOUR NERVES TO GET RID OF A HANGOVER: NO
HAVE PEOPLE ANNOYED YOU BY CRITICIZING YOUR DRINKING: NO
ALCOHOL_USE: YES
TOTAL SCORE: 0
EVER FELT BAD OR GUILTY ABOUT YOUR DRINKING: NO
HAVE YOU EVER FELT YOU SHOULD CUT DOWN ON YOUR DRINKING: NO
CONSUMPTION TOTAL: NEGATIVE
TOTAL SCORE: 0
ON A TYPICAL DAY WHEN YOU DRINK ALCOHOL HOW MANY DRINKS DO YOU HAVE: 1
HOW MANY TIMES IN THE PAST YEAR HAVE YOU HAD 5 OR MORE DRINKS IN A DAY: 0

## 2024-06-27 ASSESSMENT — PAIN DESCRIPTION - PAIN TYPE
TYPE: ACUTE PAIN

## 2024-06-27 NOTE — ASSESSMENT & PLAN NOTE
CT imaging:  There is a very small crescentic fluid collection along the buccal side of the right anterior mandible consistent with a small abscess with no fluid collection along the lingual side. There is overlying superficial fat stranding in the soft tissues with no soft tissue abscess.  Patient underwent I&D in the [facility] physician consulted Dr Kumar from [facility]. Recommended medical management with IV abx and for day team to consult on call oral surgeon for continuity of care.   Continue IV unasyn  Day team to re-consult [facility]

## 2024-06-27 NOTE — ED NOTES
"Med rec completed by Aktana tech at [facility] on 06/26/24, prior to transfer:    \"Medication history reviewed with pt. Med rec is complete.  Allergies reviewed, per pt     Pt reports that she started an antibiotic, not sure the name or strength.  Called [facility] @ [phone] to verify antibiotic      Patient has had outpatient antibiotics in the last 30 days, pt started CLINDAMYCIN 300MG on 6/25/2024 for 7 day course.  Last dose was taken today at 0600.      Pt is not on any anticoagulants     \"    "
Bedside report received from off going RN/tech: Kerri, assumed care of patient.  POC discussed with patient. Call light within reach, all needs addressed at this time.       Fall risk interventions in place: Patient's personal possessions are with in their safe reach, Place fall risk sign on patient's door, Keep floor surfaces clean and dry, and Accompanied to restroom (all applicable per [place] Fall risk assessment)   Continuous monitoring: Not Applicable   IVF/IV medications: Infusion per MAR (List Med(s)) NS  Oxygen: Room Air  Bedside sitter: Not Applicable   Isolation: Not Applicable    Pt ambulatory, refusing fluids at this time    
Bedside report received from off going RN: Gloria, assumed care of patient.  POC discussed with patient. Call light within reach, all needs addressed at this time.       Fall risk interventions in place: Patient's personal possessions are with in their safe reach, Place socks on patient, and Keep floor surfaces clean and dry (all applicable per [place] Fall risk assessment)   Continuous monitoring: Cardiac Leads, Pulse Ox, or Blood Pressure  IVF/IV medications: Not Applicable   Oxygen: Room Air  Bedside sitter: Not Applicable   Isolation: Not Applicable        
Bedside report to Kerri VILLANUEVA  
ERP at bedside for re-eval. Admitting at bedside.   
ERP at bedside, able to doppler dorsalis pedis pulse.   
Medicated per MAR. Denies needs at this time.   
Pt pending bed up stair.   
Pt transported off unit with transport staff for admission. Report given, belongings sent with patient  
Pt up to restroom, steady on feet.   
Report to RICH Benjamin  
No

## 2024-06-27 NOTE — ASSESSMENT & PLAN NOTE
Patient smokes 1/2 PPD.  Nicotine patch and/or gum offered. Patient wants nicotine patch.   I discussed cessation with patient including starting on nicotine patch and/or gum on discharge.  I also discussed medications to help with cessation with patient including Wellbutrin and Chantix, offered, but patient would just like nicotine patches .  Smoking cessation discussed with patient for 3 minutes.

## 2024-06-27 NOTE — PROGRESS NOTES
Patient left the floor for discharge lounge at this time via wheelchair with transport, IVs removed and home medications returned to patient

## 2024-06-27 NOTE — ED TRIAGE NOTES
"Chief Complaint   Patient presents with    Leg Pain     Left lower leg pain started this morning. Hx of arterial occlusions. Pt missed Plavix dose x multiple weeks. Pt denies pain at this time.   Foot is warm to the touch but no palpable pulse    Abscess     Mandibular abscess, right      Pt BIB EMS as transfer from [facility] for above complaint. Arterial US showed monophasic flow in left femoral/popliteal arteries. Pt received 5800 heparin bolus, Unasyn, and 2 L LR at sending facility. Pt transferred for high level of care and heparin infusion.     Prior facility consulted Dr. Hicks for mandibular abscess, no surgery tonight, ABX and will re-consult in the AM.    /70   Pulse 72   Temp 36.3 °C (97.4 °F) (Temporal)   Resp 16   Ht 1.651 m (5' 5\")   Wt 94.3 kg (207 lb 14.3 oz)   LMP 10/13/2016   SpO2 92%   BMI 34.60 kg/m²     "

## 2024-06-27 NOTE — ED NOTES
Transport secured to [facility].     Heparin gtt and load ordered. ERP OK with admin load dose now and for [facility] to initiate gtt. Anti Xa and coags drawn prior.

## 2024-06-27 NOTE — H&P
Hospital Medicine History & Physical Note    Date of Service  6/26/2024    Primary Care Physician  GABRIELA Adame.    Consultants  vascular surgery    Specialist Names: DR. Beltran     Code Status  Full Code    Chief Complaint  Chief Complaint   Patient presents with    Leg Pain     Left lower leg pain started this morning. Hx of arterial occlusions. Pt missed Plavix dose x multiple weeks. Pt denies pain at this time.   Foot is warm to the touch but no palpable pulse    Abscess     Mandibular abscess, right        History of Presenting Illness  Reba Nicole is a 59 y.o. female who presented 6/26/2024 with past medical history of peripheral arterial disease, hypertension, hypothyroidism patient consulted with hospital for higher level of care.  Patient initially presented to their emergency department due to concerns of an ischemic leg.  She was initially found to be hypotensive, it was noted that her left lower extremity was cool to touch. At the outside facility CTA was obtained showing occlusion of the left common iliac artery with stent present, likely recurrent.  There is reconstitution of common femoral artery.  There is three-vessel runoff to both ankles.  The case discussed with vascular surgery on-call, who believes that this is all chronic in nature.  No indication for heparin drip at this time.  Indication for surgical intervention.  Furthermore, during her ER course, the patient was complaining of right-sided facial pain and swelling.  Imaging was obtained.  A very small current symptomatic fluid collection along the buccal side of the right anterior mandible consistent with a small abscess.  Incision and drainage was performed at the outside facility with minimal output.  Case discussed with oral surgery, recommended treatment with IV antibiotics.  No surgical intervention at this time.      I discussed the plan of care with patient.    Review of Systems  Review of Systems    Constitutional:  Negative for chills and fever.   HENT:  Negative for congestion, ear discharge, ear pain, nosebleeds, sinus pain and tinnitus.         Right sided facial pain and swelling    Eyes:  Negative for double vision, pain and discharge.   Respiratory:  Negative for cough, hemoptysis, sputum production and shortness of breath.    Cardiovascular:  Negative for chest pain, palpitations and orthopnea.   Gastrointestinal:  Negative for abdominal pain, diarrhea, nausea and vomiting.   Genitourinary:  Negative for dysuria, frequency, hematuria and urgency.   Musculoskeletal:  Negative for back pain, joint pain and neck pain.   Skin:  Negative for itching and rash.   Neurological:  Positive for tingling. Negative for dizziness, tremors, sensory change, speech change, focal weakness, weakness and headaches.   Psychiatric/Behavioral:  Negative for hallucinations, substance abuse and suicidal ideas. The patient is not nervous/anxious and does not have insomnia.        Past Medical History   has a past medical history of Arthritis, Back pain, Falls, and Hypertension.    Surgical History   has a past surgical history that includes other abdominal surgery and iliac angioplasty with stent (Left, 5/17/2020).     Family History  family history is not on file.   Family history reviewed with patient. There is no family history that is pertinent to the chief complaint.     Social History   reports that she has been smoking cigarettes. She has never used smokeless tobacco. She reports current alcohol use. She reports that she does not use drugs.    Allergies  Allergies   Allergen Reactions    Aspirin Rash and Swelling     Generalized rash, swelling in hands and feet       Medications  Prior to Admission Medications   Prescriptions Last Dose Informant Patient Reported? Taking?   HYDROcodone-acetaminophen (NORCO) 7.5-325 MG tab 6/26/2024 at 0800 Patient, Historical Yes Yes   Sig: Take 0.5-1 Tablets by mouth 2 times a day as  needed for Severe Pain. Indications: Pain   clindamycin (CLEOCIN) 300 MG Cap 6/26/2024 at 0600 Historical, Patient Yes Yes   Sig: Take 300 mg by mouth 4 times a day. Pt started on 6/25/2024 for 7 day course   clopidogrel (PLAVIX) 75 MG Tab 6/26/2024 at 0600 Patient, Historical No Yes   Sig: Take 1 Tab by mouth every day.   cyclobenzaprine (FLEXERIL) 5 mg tablet PRN at PRN Patient, Historical Yes No   Sig: Take 5-10 mg by mouth 3 times a day as needed. Indications: Muscle Spasm   levothyroxine (SYNTHROID) 100 MCG Tab 6/24/2024 at PM Patient, Historical Yes Yes   Sig: Take 100 mcg by mouth every evening.   lisinopril (PRINIVIL) 30 MG tablet 6/26/2024 at 0600 Patient, Historical No Yes   Sig: Take 1 Tablet by mouth every day.   pregabalin (LYRICA) 300 MG capsule 6/26/2024 at 0600 Patient, Historical Yes Yes   Sig: Take 300 mg by mouth 2 times a day.   rosuvastatin (CRESTOR) 20 MG Tab 6/24/2024 at PM Patient, Historical Yes No   Sig: Take 20 mg by mouth every evening.      Facility-Administered Medications: None       Physical Exam  Temp:  [36.3 °C (97.3 °F)-36.3 °C (97.4 °F)] 36.3 °C (97.4 °F)  Pulse:  [67-83] 72  Resp:  [13-16] 16  BP: ()/(49-70) 131/70  SpO2:  [92 %-95 %] 92 %  Blood Pressure: 131/70   Temperature: 36.3 °C (97.4 °F)   Pulse: 72   Respiration: 16   Pulse Oximetry: 92 %       Physical Exam  Constitutional:       General: She is not in acute distress.     Appearance: Normal appearance. She is normal weight. She is not ill-appearing, toxic-appearing or diaphoretic.   HENT:      Head: Normocephalic and atraumatic.      Nose: Nose normal.      Mouth/Throat:      Mouth: Mucous membranes are moist.   Eyes:      Extraocular Movements: Extraocular movements intact.      Pupils: Pupils are equal, round, and reactive to light.   Cardiovascular:      Rate and Rhythm: Normal rate and regular rhythm.      Pulses: Normal pulses.      Heart sounds: Normal heart sounds. No murmur heard.     No friction rub. No  "gallop.   Pulmonary:      Effort: Pulmonary effort is normal. No respiratory distress.      Breath sounds: No stridor. No wheezing, rhonchi or rales.   Chest:      Chest wall: No tenderness.   Abdominal:      General: Abdomen is flat. There is no distension.      Palpations: Abdomen is soft. There is no mass.      Tenderness: There is no abdominal tenderness. There is no guarding or rebound.      Hernia: No hernia is present.   Musculoskeletal:         General: No swelling, tenderness, deformity or signs of injury.      Right lower leg: No edema.      Left lower leg: No edema.      Comments:      Skin:     General: Skin is warm and dry.      Capillary Refill: Capillary refill takes less than 2 seconds.      Coloration: Skin is not jaundiced or pale.      Findings: No bruising, erythema, lesion or rash.   Neurological:      General: No focal deficit present.      Mental Status: She is alert and oriented to person, place, and time. Mental status is at baseline.      Cranial Nerves: No cranial nerve deficit.      Sensory: No sensory deficit.      Motor: No weakness.      Coordination: Coordination normal.   Psychiatric:         Mood and Affect: Mood normal.         Behavior: Behavior normal.         Laboratory:  Recent Labs     06/26/24  1400   WBC 13.9*   RBC 4.62   HEMOGLOBIN 15.4   HEMATOCRIT 45.5   MCV 98.5*   MCH 33.3*   MCHC 33.8   RDW 46.5   PLATELETCT 229   MPV 10.8     Recent Labs     06/26/24  1400   SODIUM 137   POTASSIUM 4.3   CHLORIDE 103   CO2 20   GLUCOSE 135*   BUN 13   CREATININE 1.01   CALCIUM 8.8     Recent Labs     06/26/24  1400   ALTSGPT 21   ASTSGOT 14   ALKPHOSPHAT 116*   TBILIRUBIN 0.5   GLUCOSE 135*     Recent Labs     06/26/24  1400 06/26/24  1657   APTT 24.2* 25.5   INR 0.92 0.92     No results for input(s): \"NTPROBNP\" in the last 72 hours.      No results for input(s): \"TROPONINT\" in the last 72 hours.    Imaging:  No orders to display       X-Ray:  I have personally reviewed the images and " compared with prior images.  EKG:  I have personally reviewed the images and compared with prior images.    Assessment/Plan:  Justification for Admission Status  I anticipate this patient will require at least two midnights for appropriate medical management, necessitating inpatient admission because facial abscess     Patient will need a Med/Surg bed on MEDICAL service .  The need is secondary to facial abscess.    * Facial abscess- (present on admission)  Assessment & Plan  CT imaging:  There is a very small crescentic fluid collection along the buccal side of the right anterior mandible consistent with a small abscess with no fluid collection along the lingual side. There is overlying superficial fat stranding in the soft tissues with no soft tissue abscess.  Patient underwent I&D in the [facility] physician consulted [place] Kumar [place] [facility]. Recommended medical management with IV abx and for day team to consult on call oral surgeon for continuity of care.   Continue IV unasyn  Day team to re-consult OM      Occlusion of left iliac artery (HCC)- (present on admission)  Assessment & Plan  CT angio: Occlusion of LEFT common iliac artery with stent present, likely recurrent.  Reconstitution of the common femoral artery.  3 vessel runoff of both ankles.  Vascular Surgeon, Dr. Beltran consulted. No interventions at this time. No need for heparin gtt. This is chronic in nature.   Patient has doplerable flow and extremities are warm to touch.     Leukocytosis  Assessment & Plan  IV unasyn  Follow up cultures   Trend with daily labs.     Lactic acidosis  Assessment & Plan  Continue with IVF  Follow up repeat lactic acid levels     Tobacco use disorder- (present on admission)  Assessment & Plan  Patient smokes 1/2 PPD.  Nicotine patch and/or gum offered. Patient wants nicotine patch.   I discussed cessation with patient including starting on nicotine patch and/or gum on discharge.  I also  discussed medications to help with cessation with patient including Wellbutrin and Chantix, offered, but patient would just like nicotine patches .  Smoking cessation discussed with patient for 3 minutes.      Hypothyroid- (present on admission)  Assessment & Plan  Continue synthroid     Essential hypertension- (present on admission)  Assessment & Plan  Continue lisinopril         VTE prophylaxis: enoxaparin ppx

## 2024-06-27 NOTE — DISCHARGE PLANNING
Care Transition Team Assessment    Information Source  Orientation Level: Oriented X4  Information Given By: Patient  Informant's Name: Reba  Who is responsible for making decisions for patient? : Patient    Readmission Evaluation  Is this a readmission?: No    Elopement Risk  Legal Hold: No  Ambulatory or Self Mobile in Wheelchair: Yes  Disoriented: No  Psychiatric Symptoms: None  History of Wandering: No  Elopement this Admit: No  Vocalizing Wanting to Leave: No  Displays Behaviors, Body Language Wanting to Leave: No-Not at Risk for Elopement    Interdisciplinary Discharge Planning  Lives with - Patient's Self Care Capacity: Alone and Able to Care For Self, Unrelated Adult  Patient or legal guardian wants to designate a caregiver: No  Support Systems: Friends / Neighbors  Housing / Facility: 2 Story House    Discharge Preparedness  What is your plan after discharge?: Home with help  What are your discharge supports?: Other (comment)  Prior Functional Level: Independent with Activities of Daily Living, Independent with Medication Management  Difficulity with ADLs: None    Functional Assesment  Prior Functional Level: Independent with Activities of Daily Living, Independent with Medication Management    Finances  Financial Barriers to Discharge: No  Prescription Coverage: Yes    Vision / Hearing Impairment  Vision Impairment : No  Hearing Impairment : No              Domestic Abuse  Possible Abuse/Neglect Reported to:: Not Applicable    Psychological Assessment  History of Substance Abuse: None  History of Psychiatric Problems: No    Discharge Risks or Barriers  Discharge risks or barriers?: No    Anticipated Discharge Information  Discharge Disposition: Discharged to home/self care (01)  Discharge Address: See Face Sheet

## 2024-06-27 NOTE — ASSESSMENT & PLAN NOTE
CT angio: Occlusion of LEFT common iliac artery with stent present, likely recurrent.  Reconstitution of the common femoral artery.  3 vessel runoff of both ankles.  Vascular Surgeon, Dr. Beltran consulted. No interventions at this time. No need for heparin gtt. This is chronic in nature.   Patient has doplerable flow and extremities are warm to touch.

## 2024-06-27 NOTE — DISCHARGE INSTRUCTIONS
Abscess  Care After  An abscess (also called a boil or furuncle) is an infected area that contains a collection of pus. Signs and symptoms of an abscess include pain, tenderness, redness, or hardness, or you may feel a moveable soft area under your skin. An abscess can occur anywhere in the body. The infection may spread to surrounding tissues causing cellulitis. A cut (incision) by the surgeon was made over your abscess and the pus was drained out. Gauze may have been packed into the space to provide a drain that will allow the cavity to heal from the inside outwards. The boil may be painful for 5 to 7 days. Most people with a boil do not have high fevers. Your abscess, if seen early, may not have localized, and may not have been lanced. If not, another appointment may be required for this if it does not get better on its own or with medications.  HOME CARE INSTRUCTIONS   Only take over-the-counter or prescription medicines for pain, discomfort, or fever as directed by your caregiver.  When you bathe, soak and then remove gauze or iodoform packs at least daily or as directed by your caregiver. You may then wash the wound gently with mild soapy water. Repack with gauze or do as your caregiver directs.  SEEK IMMEDIATE MEDICAL CARE IF:   You develop increased pain, swelling, redness, drainage, or bleeding in the wound site.  You develop signs of generalized infection including muscle aches, chills, fever, or a general ill feeling.  An oral temperature above 102° F (38.9° C) develops, not controlled by medication.  See your caregiver for a recheck if you develop any of the symptoms described above. If medications (antibiotics) were prescribed, take them as directed.  Document Released: 07/06/2006 Document Revised: 03/11/2013 Document Reviewed: 03/02/2009  [employer]® Patient Information ©2014 [employer].

## 2024-06-27 NOTE — CARE PLAN
The patient is Stable - Low risk of patient condition declining or worsening    Shift Goals  Clinical Goals: Pt to receive IV abx per MAR to treat infection.  Patient Goals: Rest    Progress made toward(s) clinical / shift goals:    Pt receiving IV unasyn per MAR to treat infection. Pt declines pain and interventions. Pt able to ambulate from gurney to bed with a steady gait. Pt able to get some rest upon being transferred to this unit.    Problem: Knowledge Deficit - Standard  Goal: Patient and family/care givers will demonstrate understanding of plan of care, disease process/condition, diagnostic tests and medications  Outcome: Progressing     Problem: Mobility  Goal: Patient's capacity to carry out activities will improve  Outcome: Progressing       Patient is not progressing towards the following goals:

## 2024-06-27 NOTE — PROGRESS NOTES
4 Eyes Skin Assessment Completed by RICH Pope and RICH Price.    Head WDL  Ears WDL  Nose WDL  Mouth WDL  Neck WDL  Breast/Chest Redness under breasts  Shoulder Blades WDL  Spine WDL  (R) Arm/Elbow/Hand Dry  (L) Arm/Elbow/Hand Dry  Abdomen WDL  Groin WDL  Coccyx/Buttocks WDL  (R) Leg WDL  (L) Leg WDL  (R) Heel/Foot/Toe Redness, dry  (L) Heel/Foot/Toe Redness, dry                    Devices In Places None      Interventions In Place Pillows and Pressure Redistribution Mattress    Possible Skin Injury No    Pictures Uploaded Into Epic Yes  Wound Consult Placed Yes  RN Wound Prevention Protocol Ordered No

## 2024-06-27 NOTE — ED PROVIDER NOTES
"ED Provider Note    CHIEF COMPLAINT  Chief Complaint   Patient presents with    Leg Pain     Left lower leg pain started this morning. Hx of arterial occlusions. Pt missed Plavix dose x multiple weeks. Pt denies pain at this time.   Foot is warm to the touch but no palpable pulse    Abscess     Mandibular abscess, right        HPI  Reba Adrienne Nicole is a 59 y.o. female who presents in transfer from [facility] after found to be having a possible ischemic limb in the setting of hypotension thought to be due to an infected facial/dental issue.  Patient notes her leg does not hurt and feels normal but she notes that earlier today it felt cold and partially numb, especially toward the great toe.  She notes her mandible is the main source of her issues and notes that \"they cut me open but nothing came out.\"  She notes some mild tingling to the left great toe but otherwise her lower extremities \"feel fine\" and she has no pain.  EXTERNAL RECORDS REVIEWED  Reviewed ED visit at [facility] prior to arrival at this facility.  Patient had vascular ultrasound and CT aorta with runoff indicating a stenosis/occlusion in the left iliac region but with reconstitution.  Reviewed CT aorta with runoff today which demonstrates occlusion of the left common iliac artery, With stent, likely recurrent, and three-vessel runoff of both ankles.  Also reviewed maxillofacial CT done today with a small crescentic shaped fluid collection along the buccal mucosa of the right mandible  ROS  Constitutional: No fevers or chills  Skin: No rashes  HEENT: No sore throat, or runny nose  Neck: No neck pain  Chest: No pain or rashes  Pulm: No shortness of breath, cough, wheezing, stridor, or pain with inspiration/expiration  Gastrointestinal: No nausea, vomiting, diarrhea, constipation, bloating, melena, hematochezia or abdominal pain.  Genitourinary: No dysuria or hematuria  Musculoskeletal: No pain, swelling, or focal weakness  Neurologic: No " "sensory or focal motor changes to extremities. No confusion or disorientation.  Heme: No bleeding or bruising problems.   Immuno: No hx of recurrent infections        LIMITATION TO HISTORY   None  OUTSIDE HISTORIAN(S):  None        PAST FAM HISTORY  No family history on file.    PAST MEDICAL HISTORY   has a past medical history of Arthritis, Back pain, Falls, and Hypertension.    SOCIAL HISTORY  Social History     Tobacco Use    Smoking status: Every Day     Current packs/day: 0.50     Types: Cigarettes    Smokeless tobacco: Never   Vaping Use    Vaping status: Never Used   Substance and Sexual Activity    Alcohol use: Yes    Drug use: No    Sexual activity: Not on file       SURGICAL HISTORY   has a past surgical history that includes other abdominal surgery and iliac angioplasty with stent (Left, 5/17/2020).    CURRENT MEDICATIONS  Home Medications       Reviewed by Melia Faith (Pharmacy Tech) on 06/26/24 at 1837  Med List Status: Complete     Medication Last Dose Status   clindamycin (CLEOCIN) 300 MG Cap 6/26/2024 Active   clopidogrel (PLAVIX) 75 MG Tab 6/26/2024 Active   cyclobenzaprine (FLEXERIL) 5 mg tablet PRN Active   HYDROcodone-acetaminophen (NORCO) 7.5-325 MG tab 6/26/2024 Active   levothyroxine (SYNTHROID) 100 MCG Tab 6/24/2024 Active   lisinopril (PRINIVIL) 30 MG tablet 6/26/2024 Active   pregabalin (LYRICA) 300 MG capsule 6/26/2024 Active   rosuvastatin (CRESTOR) 20 MG Tab 6/24/2024 Active                     ALLERGIES  Allergies   Allergen Reactions    Aspirin Rash and Swelling     Generalized rash, swelling in hands and feet       PHYSICAL EXAM  VITAL SIGNS: /65   Pulse 75   Temp 36.3 °C (97.4 °F) (Temporal)   Resp 16   Ht 1.651 m (5' 5\")   Wt 94.3 kg (207 lb 14.3 oz)   LMP 10/13/2016   SpO2 91%   BMI 34.60 kg/m²    Gen: Alert in no apparent distress.  HEENT: No signs of trauma, Bilateral external ears normal, Nose normal. Conjunctiva normal, Non-icteric.   Neck:  No " tenderness, Supple, No masses  Lymphatic: No cervical lymphadenopathy noted.   Cardiovascular: Regular rate and rhythm, no murmurs.  Capillary refill less than 3 seconds to all extremities, 2+ distal pulses.  Thorax & Lungs: Normal breath sounds, No respiratory distress, No wheezing bilateral chest rise  Abdomen: Bowel sounds normal, Soft, No tenderness, No masses, No pulsatile masses. No Guarding or rebound  Skin: Warm, Dry, No erythema, No rash noted to exposed areas.   Back: No bony tenderness, No CVA tenderness.   Extremities: Patient has intact dorsalis pedis pulse on the right which appears 21+, unable to palpate posterior tibial however capillary refill is less than 3 seconds to the right lower extremity.  Capillary refill is less than 3 seconds to the left lower extremity as well however I cannot palpate pulses to the dorsalis pedis posterior tibial.  Dopplerable pulses were obtained to the dorsalis pedis but no dopplerable pulse was noted to the posterior tibial on the left.  Patient notes no symptoms except for some slight tingling to her great toe on the left.  She has no upper leg pain and no groin pain   Neurologic: Alert , no facial droop, grossly normal coordination and strength  Psychiatric: Affect pleasant    INITIAL IMPRESSION  Patient arrives for evaluation of what appears to be 2 separate issues however it was felt that the patient's decreased blood flow was related to a low flow state from an infection.  Currently, she has no symptoms in her lower extremity with the exception of some tingling in her great toe which she states is normal.  She is able to stand and ambulate and has no pain.  I am able to Doppler pulses on both lower extremities and I will need to discuss the case briefly with the vascular surgeon but I suspect he will note chronicity to the occlusive problem in her lower extremities and recommend outpatient follow-up.  Will plan on admitting the patient for her infectious reasons  alone.    ED observation? No    Reviewed all lab and imaging findings from [facility] earlier today.    ASSESSMENT, COURSE AND PLAN  Care Narrative:   6:20 PM  Discussed case with on-call vascular surgeon, Dr. Beltran, who noted that he felt the issue was a chronic wound and did not require any further heparinization or emergent vascular surgery consult.  He did suggest that the patient follow-up with her vascular surgeon as an outpatient should she have issues with claudication.    Case discussed with hospitalist who will evaluate the patient in the emergency department for admission and IV antibiotic administration for her odontogenic infection/abscess.          ADDITIONAL PROBLEMS MANAGED    I have discussed management of the patient with the following physicians and LUC's: Dr. Beltrna, vascular surgeon, Dr. Marion, hospitalist    Escalation of care considered, and ultimately not performed: None    Barriers to care at this time, including but not limited to: None.     Decision tools and Rx drugs considered including, but not limited to : Heparin considered but felt to be unnecessary by the vascular surgeon    Discussion of management with other QHP or appropriate source(s): None        FINAL IMPRESSION  1. Dental abscess    2. Facial cellulitis    3. 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

## 2024-06-27 NOTE — PROGRESS NOTES
Assumed care of patient at 0305. Received report from Sourav VILLANUEVA in ED. Patient A&Ox4, on RA, Reporting a pain level of 0/10. Call light within reach, belongings within reach, fall precautions in place, bed in lowest position. Patient does not have any other needs at this time.     POC was discussed with patient. All questions were answered. Patient verbalized understanding.

## 2024-06-27 NOTE — DISCHARGE SUMMARY
Discharge Summary    CHIEF COMPLAINT ON ADMISSION  Chief Complaint   Patient presents with    Leg Pain     Left lower leg pain started this morning. Hx of arterial occlusions. Pt missed Plavix dose x multiple weeks. Pt denies pain at this time.   Foot is warm to the touch but no palpable pulse    Abscess     Mandibular abscess, right        Reason for Admission  ems     Admission Date  6/26/2024    CODE STATUS  Full Code    HPI & HOSPITAL COURSE  Reba Nicole is a 59 y.o. female with past medical history of peripheral arterial disease, hypertension, hypothyroidism,  who presented 6/26/2024 leg pain which was cool to touch.  She was initially seen at an outside facility where CTA was obtained which showed occlusion of the left common iliac artery with stent present, likely recurrent, with consideration of common femoral artery, and three-vessel runoff to both ankles. The case was discussed with vascular surgery on-call, who believes that this is all chronic in nature with no indication for heparin drip or surgical intervention at this time.  She was continued on Plavix and Lipitor.  However, she complained of right-sided facial pain and swelling prompting imaging which showed very small crescentic fluid collection along the buccal side of the right anterior mandible crescentic with a small abscess with no fluid collection along the lingual side with overlying superficial fat stranding with no soft tissue abscess and slightly more prominent lucency of the right alveolar nerve canal, no definite bony destruction to suggest osteomyelitis. Incision and drainage was performed at the outside facility with minimal output.  Case was discussed with oral surgery, who recommended treatment with IV antibiotics with no indication for surgical intervention at this time.  Patient was started on Unasyn.  She initially had mild elevated lactate for which she was given IV fluids.    She clinically improved.  Lactate level has  normalized.  Electrolytes and renal function remain normal.  Her pain improved, with decrease in swelling of the right jaw.  She remained hemodynamically stable and afebrile.    I have personally seen and examined the patient on the day of discharge. With her clinical improvement, she was deemed ready to discharge from the hospital as she did not have any further hospitalization needs. Patient felt comfortable going home. The discharge plan was discussed with the patient, with which she was agreeable to.     Therefore, she is discharged in good and stable condition to home with close outpatient follow-up.    The patient recovered much more quickly than anticipated on admission.    Discharge Date  6/27/2024      FOLLOW UP ITEMS POST DISCHARGE  -She will complete 10 days course of oral Augmentin.  She will follow-up with outpatient dentist.  -Continue Plavix and Lipitor.  -Follow-up with PCP.  - counseled to seek immediate medical attention, or return to the ED for recurrent or worsening symptoms.      DISCHARGE DIAGNOSES  Principal Problem:    Facial abscess (POA: Yes)  Active Problems:    Occlusion of left iliac artery (HCC) (POA: Yes)    Essential hypertension (POA: Yes)    Hypothyroid (POA: Yes)    Tobacco use disorder (POA: Yes)    Lactic acidosis (POA: Yes)    Leukocytosis (POA: Yes)  Resolved Problems:    * No resolved hospital problems. *      FOLLOW UP  No future appointments.  No follow-up provider specified.    MEDICATIONS ON DISCHARGE     Medication List        START taking these medications        Instructions   amoxicillin-clavulanate 875-125 MG Tabs  Commonly known as: Augmentin   Take 1 Tablet by mouth 2 times a day for 10 days.  Dose: 1 Tablet            CONTINUE taking these medications        Instructions   clopidogrel 75 MG Tabs  Commonly known as: Plavix   Take 1 Tab by mouth every day.  Dose: 75 mg     cyclobenzaprine 5 mg tablet  Commonly known as: Flexeril   Take 5-10 mg by mouth 3 times a day  as needed. Indications: Muscle Spasm  Dose: 5-10 mg     HYDROcodone-acetaminophen 7.5-325 MG tab  Commonly known as: Norco   Take 0.5-1 Tablets by mouth 2 times a day as needed for Severe Pain. Indications: Pain  Dose: 0.5-1 Tablet     levothyroxine 100 MCG Tabs  Commonly known as: Synthroid   Take 100 mcg by mouth every evening.  Dose: 100 mcg     lisinopril 30 MG tablet  Commonly known as: Prinivil   Take 1 Tablet by mouth every day.  Dose: 30 mg     pregabalin 300 MG capsule  Commonly known as: Lyrica   Take 300 mg by mouth 2 times a day.  Dose: 300 mg     rosuvastatin 20 MG Tabs  Commonly known as: Crestor   Take 20 mg by mouth every evening.  Dose: 20 mg            STOP taking these medications      clindamycin 300 MG Caps  Commonly known as: Cleocin              Allergies  Allergies   Allergen Reactions    Aspirin Rash and Swelling     Generalized rash, swelling in hands and feet       DIET  Orders Placed This Encounter   Procedures    Diet Order Diet: Regular     Standing Status:   Standing     Number of Occurrences:   1     Order Specific Question:   Diet:     Answer:   Regular [1]       ACTIVITY  As tolerated.  Weight bearing as tolerated    CONSULTATIONS  Vascular surgery  Oral surgery    PROCEDURES  none    LABORATORY  Lab Results   Component Value Date    SODIUM 137 06/26/2024    POTASSIUM 4.3 06/26/2024    CHLORIDE 103 06/26/2024    CO2 20 06/26/2024    GLUCOSE 135 (H) 06/26/2024    BUN 13 06/26/2024    CREATININE 1.01 06/26/2024        Lab Results   Component Value Date    WBC 13.9 (H) 06/26/2024    HEMOGLOBIN 15.4 06/26/2024    HEMATOCRIT 45.5 06/26/2024    PLATELETCT 229 06/26/2024        Total time of the discharge process = 40 minutes.

## 2024-06-27 NOTE — PROGRESS NOTES
Patient to be discharged today - patient aware and agreeable to plan. D/c instructions reviewed with patient - ?'s/concerns answered. No piv, waiting on meds to beds.

## 2024-06-28 PROCEDURE — 93922 UPR/L XTREMITY ART 2 LEVELS: CPT | Mod: 26 | Performed by: INTERNAL MEDICINE

## 2024-07-16 ENCOUNTER — APPOINTMENT (OUTPATIENT)
Dept: RADIOLOGY | Facility: MEDICAL CENTER | Age: 59
End: 2024-07-16
Attending: EMERGENCY MEDICINE
Payer: MEDICAID

## 2024-07-16 ENCOUNTER — HOSPITAL ENCOUNTER (EMERGENCY)
Facility: MEDICAL CENTER | Age: 59
End: 2024-07-16
Attending: EMERGENCY MEDICINE
Payer: MEDICAID

## 2024-07-16 VITALS
SYSTOLIC BLOOD PRESSURE: 124 MMHG | HEIGHT: 65 IN | RESPIRATION RATE: 16 BRPM | DIASTOLIC BLOOD PRESSURE: 68 MMHG | HEART RATE: 76 BPM | BODY MASS INDEX: 34.99 KG/M2 | TEMPERATURE: 98 F | OXYGEN SATURATION: 96 % | WEIGHT: 210 LBS

## 2024-07-16 DIAGNOSIS — M79.605 LEFT LEG PAIN: ICD-10-CM

## 2024-07-16 DIAGNOSIS — I99.9 VASCULAR DISEASE: ICD-10-CM

## 2024-07-16 LAB
ALBUMIN SERPL BCP-MCNC: 3.8 G/DL (ref 3.2–4.9)
ALBUMIN/GLOB SERPL: 1.7 G/DL
ALP SERPL-CCNC: 134 U/L (ref 30–99)
ALT SERPL-CCNC: 38 U/L (ref 2–50)
ANION GAP SERPL CALC-SCNC: 13 MMOL/L (ref 7–16)
APTT PPP: 22.6 SEC (ref 24.7–36)
AST SERPL-CCNC: 19 U/L (ref 12–45)
BASOPHILS # BLD AUTO: 0.7 % (ref 0–1.8)
BASOPHILS # BLD: 0.05 K/UL (ref 0–0.12)
BILIRUB SERPL-MCNC: 0.2 MG/DL (ref 0.1–1.5)
BUN SERPL-MCNC: 12 MG/DL (ref 8–22)
CALCIUM ALBUM COR SERPL-MCNC: 9.4 MG/DL (ref 8.5–10.5)
CALCIUM SERPL-MCNC: 9.2 MG/DL (ref 8.5–10.5)
CHLORIDE SERPL-SCNC: 108 MMOL/L (ref 96–112)
CO2 SERPL-SCNC: 20 MMOL/L (ref 20–33)
CREAT SERPL-MCNC: 0.52 MG/DL (ref 0.5–1.4)
EOSINOPHIL # BLD AUTO: 0.21 K/UL (ref 0–0.51)
EOSINOPHIL NFR BLD: 3.1 % (ref 0–6.9)
ERYTHROCYTE [DISTWIDTH] IN BLOOD BY AUTOMATED COUNT: 51.8 FL (ref 35.9–50)
GFR SERPLBLD CREATININE-BSD FMLA CKD-EPI: 108 ML/MIN/1.73 M 2
GLOBULIN SER CALC-MCNC: 2.3 G/DL (ref 1.9–3.5)
GLUCOSE SERPL-MCNC: 127 MG/DL (ref 65–99)
HCT VFR BLD AUTO: 42.2 % (ref 37–47)
HGB BLD-MCNC: 14 G/DL (ref 12–16)
IMM GRANULOCYTES # BLD AUTO: 0.02 K/UL (ref 0–0.11)
IMM GRANULOCYTES NFR BLD AUTO: 0.3 % (ref 0–0.9)
INR PPP: 0.88 (ref 0.87–1.13)
LYMPHOCYTES # BLD AUTO: 2.18 K/UL (ref 1–4.8)
LYMPHOCYTES NFR BLD: 32.4 % (ref 22–41)
MCH RBC QN AUTO: 32.9 PG (ref 27–33)
MCHC RBC AUTO-ENTMCNC: 33.2 G/DL (ref 32.2–35.5)
MCV RBC AUTO: 99.3 FL (ref 81.4–97.8)
MONOCYTES # BLD AUTO: 0.48 K/UL (ref 0–0.85)
MONOCYTES NFR BLD AUTO: 7.1 % (ref 0–13.4)
NEUTROPHILS # BLD AUTO: 3.78 K/UL (ref 1.82–7.42)
NEUTROPHILS NFR BLD: 56.4 % (ref 44–72)
NRBC # BLD AUTO: 0 K/UL
NRBC BLD-RTO: 0 /100 WBC (ref 0–0.2)
PLATELET # BLD AUTO: 259 K/UL (ref 164–446)
PMV BLD AUTO: 10.8 FL (ref 9–12.9)
POTASSIUM SERPL-SCNC: 3.8 MMOL/L (ref 3.6–5.5)
PROT SERPL-MCNC: 6.1 G/DL (ref 6–8.2)
PROTHROMBIN TIME: 12 SEC (ref 12–14.6)
RBC # BLD AUTO: 4.25 M/UL (ref 4.2–5.4)
SODIUM SERPL-SCNC: 141 MMOL/L (ref 135–145)
WBC # BLD AUTO: 6.7 K/UL (ref 4.8–10.8)

## 2024-07-16 PROCEDURE — 85025 COMPLETE CBC W/AUTO DIFF WBC: CPT

## 2024-07-16 PROCEDURE — 700117 HCHG RX CONTRAST REV CODE 255: Mod: UD | Performed by: EMERGENCY MEDICINE

## 2024-07-16 PROCEDURE — 99284 EMERGENCY DEPT VISIT MOD MDM: CPT

## 2024-07-16 PROCEDURE — 36415 COLL VENOUS BLD VENIPUNCTURE: CPT

## 2024-07-16 PROCEDURE — 85730 THROMBOPLASTIN TIME PARTIAL: CPT

## 2024-07-16 PROCEDURE — 85610 PROTHROMBIN TIME: CPT

## 2024-07-16 PROCEDURE — 73706 CT ANGIO LWR EXTR W/O&W/DYE: CPT | Mod: LT

## 2024-07-16 PROCEDURE — 80053 COMPREHEN METABOLIC PANEL: CPT

## 2024-07-16 RX ADMIN — IOHEXOL 100 ML: 350 INJECTION, SOLUTION INTRAVENOUS at 18:15

## 2024-07-16 ASSESSMENT — FIBROSIS 4 INDEX: FIB4 SCORE: 0.76

## 2024-08-29 ENCOUNTER — TELEPHONE (OUTPATIENT)
Dept: VASCULAR SURGERY | Facility: MEDICAL CENTER | Age: 59
End: 2024-08-29

## 2024-08-29 ENCOUNTER — APPOINTMENT (OUTPATIENT)
Dept: ADMISSIONS | Facility: MEDICAL CENTER | Age: 59
End: 2024-08-29
Attending: SURGERY
Payer: MEDICAID

## 2024-08-29 ENCOUNTER — OFFICE VISIT (OUTPATIENT)
Dept: VASCULAR SURGERY | Facility: MEDICAL CENTER | Age: 59
End: 2024-08-29
Payer: MEDICAID

## 2024-08-29 VITALS
OXYGEN SATURATION: 96 % | TEMPERATURE: 98.4 F | SYSTOLIC BLOOD PRESSURE: 160 MMHG | DIASTOLIC BLOOD PRESSURE: 88 MMHG | HEIGHT: 65 IN | WEIGHT: 210 LBS | HEART RATE: 80 BPM | BODY MASS INDEX: 34.99 KG/M2

## 2024-08-29 DIAGNOSIS — E78.5 DYSLIPIDEMIA: ICD-10-CM

## 2024-08-29 DIAGNOSIS — I74.5 OCCLUSION OF LEFT ILIAC ARTERY (HCC): ICD-10-CM

## 2024-08-29 DIAGNOSIS — I10 PRIMARY HYPERTENSION: ICD-10-CM

## 2024-08-29 DIAGNOSIS — Z72.0 TOBACCO USE: ICD-10-CM

## 2024-08-29 ASSESSMENT — FIBROSIS 4 INDEX: FIB4 SCORE: 0.68

## 2024-08-29 NOTE — PROGRESS NOTES
VASCULAR SURGERY SERVICE  CONSULT NOTE      Date: 8/29/2024    Referring Provider: Gene Mcguire MD    Consulting Physician: Julio César Beltran MD - [facility]     -------------------------------------------------------------------------------------------------    Reason for consultation:  Left leg claudication.    HPI:  This is a 59 y.o. female who back in 2020 underwent left iliac artery stenting by Dr. Gimenez.  Patient was doing well until about 2 months ago when she developed recurrent severe left leg claudication as well as numbness of the left first and fifth toes.  CTA was performed and showed occlusion of the left iliac arterial system with reconstitution of flow below the occlusion.  Patient is referred to vascular service.  Patient is a longtime smoker, now down to half a pack a day.    Past Medical History:   Diagnosis Date    Arthritis     Back pain     Falls     Hypertension        Past Surgical History:   Procedure Laterality Date    ILIAC ANGIOPLASTY WITH STENT Left 5/17/2020    Procedure: ANGIOPLASTY, ARTERY, ILIAC, WITH STENT INSERTION;  Surgeon: Cuco Gimenez M.D.;  Location: SURGERY [facility];  Service: Vascular    OTHER ABDOMINAL SURGERY      gallbladder removed       Current Outpatient Medications   Medication Sig Dispense Refill    lisinopril (PRINIVIL) 30 MG tablet Take 1 Tablet by mouth every day. 30 Tablet 0    cyclobenzaprine (FLEXERIL) 5 mg tablet Take 5-10 mg by mouth 3 times a day as needed. Indications: Muscle Spasm      levothyroxine (SYNTHROID) 100 MCG Tab Take 100 mcg by mouth every evening.      HYDROcodone-acetaminophen (NORCO) 7.5-325 MG tab Take 0.5-1 Tablets by mouth 2 times a day as needed for Severe Pain. Indications: Pain      rosuvastatin (CRESTOR) 20 MG Tab Take 20 mg by mouth every evening.      pregabalin (LYRICA) 300 MG capsule Take 300 mg by mouth 2 times a day.      clopidogrel (PLAVIX) 75 MG Tab Take 1 Tab by mouth every day. 90 Tab 11     No current  facility-administered medications for this visit.       Social History     Socioeconomic History    Marital status:      Spouse name: Not on file    Number of children: Not on file    Years of education: Not on file    Highest education level: Not on file   Occupational History    Not on file   Tobacco Use    Smoking status: Every Day     Current packs/day: 0.50     Types: Cigarettes    Smokeless tobacco: Never   Vaping Use    Vaping status: Never Used   Substance and Sexual Activity    Alcohol use: Yes    Drug use: No    Sexual activity: Not on file   Other Topics Concern    Not on file   Social History Narrative    Not on file     Social Determinants of Health     Financial Resource Strain: Low Risk  (5/21/2020)    Overall Financial Resource Strain (CARDIA)     Difficulty of Paying Living Expenses: Not very hard   Food Insecurity: Food Insecurity Present (6/27/2024)    Hunger Vital Sign     Worried About Running Out of Food in the Last Year: Sometimes true     Ran Out of Food in the Last Year: Sometimes true   Transportation Needs: Unmet Transportation Needs (6/27/2024)    PRAPARE - Transportation     Lack of Transportation (Medical): Yes     Lack of Transportation (Non-Medical): Yes   Physical Activity: Not on file   Stress: Not on file   Social Connections: Not on file   Intimate Partner Violence: Not At Risk (6/27/2024)    Humiliation, Afraid, Rape, and Kick questionnaire     Fear of Current or Ex-Partner: No     Emotionally Abused: No     Physically Abused: No     Sexually Abused: No   Housing Stability: Low Risk  (6/27/2024)    Housing Stability Vital Sign     Unable to Pay for Housing in the Last Year: No     Number of Places Lived in the Last Year: 1     Unstable Housing in the Last Year: No       No family history on file.    Allergies:  Aspirin    Review of Systems:    Constitutional: Negative for fever, chills, weight loss,   HENT:   Negative for hearing loss or tinnitus    Eyes:    Negative for  "blurred vision, double vision, or loss of vision  Respiratory:  Negative for cough, hemoptysis, or wheezing    Cardiac:  Negative for chest pain or palpitations or orthopnea  Vascular:  Positive for left leg claudication.  Gastrointestinal: Negative for nausea, vomiting, or abdominal pain     Negative for hematochezia or melena   Genitourinary: Negative for dysuria, frequency, or hematuria   Musculoskeletal: Positive for chronic back pain  Skin:   Negative for itching or rash  Neurological:  Negative for dizziness, headaches, or tremors     Negative for speech disturbance     Positive for numbness of the left first and fifth toes.  Endo/Heme:  Negative for easy bruising or bleeding  Psychiatric:  Negative for depression, suicidal ideas, or hallucinations    Physical Exam:  BP (!) 160/88 (BP Location: Left arm, Patient Position: Sitting, BP Cuff Size: Adult)   Pulse 80   Temp 36.9 °C (98.4 °F) (Temporal)   Ht 1.651 m (5' 5\")   Wt 95.3 kg (210 lb)   SpO2 96%     Constitutional: Alert, oriented, no acute distress  HEENT:  Normocephalic and atraumatic, EOMI  Neck:   Supple, no JVD, no bruits.  Cardiovascular: Regular rate and rhythm  Pulmonary:  Good air entry bilaterally  Abdominal:  Soft, non-tender, non-distended     Aortic impulse not widened  Musculoskeletal: No edema, no tenderness  Neurological:  CN II-XII grossly intact, no focal deficits  Skin:   Skin is warm and dry. No rash noted.  Psychiatric:  Normal mood and affect.  Upper extremities: Palpable bilateral radial pulses with multiphasic flow.  Lower extremities: Palpable right common femoral pulses.  Left common femoral pulses are nonpalpable.  Multiphasic flow signals are obtained over the right pedal arteries.  Monophasic Doppler flow signals obtained over the left dorsalis pedis artery.  There is rubor of the left first and fifth toes.    Labs:  Reviewed.    Radiology:  Reviewed.    Assessment:  -Left iliac occlusion with severe left leg " claudication.  -Tobacco use.  -Hypertension.  -Dyslipidemia.    Plan:  I had a long discussion with patient.  Study results were reviewed with her.  Patient would like to have intervention done for her left leg.  I discussed with her the option of undergoing angiogram with possible endovascular intervention if feasible and appropriate.  If endovascular intervention is not feasible, patient will need to undergo a right to left femoral-femoral bypass.  Risks of the procedures were discussed which include, but not limited to, bleeding, infection, seroma formation, seroma leak, and perioperative anesthetic complications.  I also told patient that any procedure done will close down at some point and will closed down faster if she continues to smoke.  Patient indicated understanding and would like to proceed with the procedures, fully understanding all risks.  All questions were answered.  At her request, the procedures will be performed on September 11, 2024, pending operating room availability.      Julio César Beltran MD  [facility]   [place] preferred or call my office [phone]  __________________________________________________________________  Patient:Reba Kaspero   MRN:[id number]   [facility]:[id number]

## 2024-08-29 NOTE — TELEPHONE ENCOUNTER
I called patient and scheduled procedure with Dr. Beltran for 9/11 @ 7:30 am./ Patient is to check in @ 5:30 am in the [facility] 1st floor.     Patient instructed nothing to eat or drink 8 hours prior and she will need a  once discharged.    Patient ok to continue to take Plavix per Dr. Beltran.

## 2024-09-04 ENCOUNTER — PRE-ADMISSION TESTING (OUTPATIENT)
Dept: ADMISSIONS | Facility: MEDICAL CENTER | Age: 59
End: 2024-09-04
Attending: SURGERY
Payer: MEDICAID

## 2024-09-04 ASSESSMENT — LIFESTYLE VARIABLES
SKIP TO QUESTIONS 9-10: 1
AUDIT-C TOTAL SCORE: 3
HOW OFTEN DO YOU HAVE A DRINK CONTAINING ALCOHOL: 2-3 TIMES A WEEK
HOW OFTEN DO YOU HAVE SIX OR MORE DRINKS ON ONE OCCASION: NEVER
HOW MANY STANDARD DRINKS CONTAINING ALCOHOL DO YOU HAVE ON A TYPICAL DAY: 1 OR 2

## 2024-09-06 ENCOUNTER — PRE-ADMISSION TESTING (OUTPATIENT)
Dept: ADMISSIONS | Facility: MEDICAL CENTER | Age: 59
End: 2024-09-06
Attending: SURGERY
Payer: MEDICAID

## 2024-09-06 DIAGNOSIS — Z01.812 PRE-OPERATIVE LABORATORY EXAMINATION: ICD-10-CM

## 2024-09-06 DIAGNOSIS — Z01.810 PRE-OPERATIVE CARDIOVASCULAR EXAMINATION: ICD-10-CM

## 2024-09-06 LAB
ABO GROUP BLD: NORMAL
BLD GP AB SCN SERPL QL: NORMAL
EKG IMPRESSION: NORMAL
RH BLD: NORMAL

## 2024-09-06 PROCEDURE — 93010 ELECTROCARDIOGRAM REPORT: CPT | Performed by: INTERNAL MEDICINE

## 2024-09-06 PROCEDURE — 86850 RBC ANTIBODY SCREEN: CPT

## 2024-09-06 PROCEDURE — 86901 BLOOD TYPING SEROLOGIC RH(D): CPT

## 2024-09-06 PROCEDURE — 86900 BLOOD TYPING SEROLOGIC ABO: CPT

## 2024-09-06 PROCEDURE — 36415 COLL VENOUS BLD VENIPUNCTURE: CPT

## 2024-09-06 PROCEDURE — 93005 ELECTROCARDIOGRAM TRACING: CPT

## 2024-09-09 ENCOUNTER — TELEPHONE (OUTPATIENT)
Dept: VASCULAR SURGERY | Facility: MEDICAL CENTER | Age: 59
End: 2024-09-09
Payer: MEDICAID

## 2024-09-09 NOTE — TELEPHONE ENCOUNTER
I called patient and confirmed procedure with Dr. Beltran for 9/11 @ 7:30 with a check in time of 5:30 am in the [address] floor.     Patient has a concern about her currently taking abx Cephalexin 500 mg tab QID for 28 days that was prescribed by her PCP Radha Arias for an ingrown hair. Patient stated that she has been on the abx for 7 days now.     Message sent to Dr. Beltran.     Per Dr. Beltran, abx should not interfere with procedure on 9/11. Patient notified.

## 2024-09-11 ENCOUNTER — HOSPITAL ENCOUNTER (OUTPATIENT)
Facility: MEDICAL CENTER | Age: 59
End: 2024-09-11
Attending: SURGERY | Admitting: SURGERY
Payer: MEDICAID

## 2024-09-11 VITALS
BODY MASS INDEX: 35.78 KG/M2 | HEART RATE: 81 BPM | RESPIRATION RATE: 16 BRPM | SYSTOLIC BLOOD PRESSURE: 157 MMHG | WEIGHT: 214.73 LBS | DIASTOLIC BLOOD PRESSURE: 91 MMHG | TEMPERATURE: 97 F | HEIGHT: 65 IN | OXYGEN SATURATION: 97 %

## 2024-09-11 RX ORDER — ACETAMINOPHEN 500 MG
1000 TABLET ORAL ONCE
Status: DISCONTINUED | OUTPATIENT
Start: 2024-09-11 | End: 2024-09-11 | Stop reason: HOSPADM

## 2024-09-11 RX ORDER — SODIUM CHLORIDE, SODIUM LACTATE, POTASSIUM CHLORIDE, CALCIUM CHLORIDE 600; 310; 30; 20 MG/100ML; MG/100ML; MG/100ML; MG/100ML
INJECTION, SOLUTION INTRAVENOUS CONTINUOUS
Status: DISCONTINUED | OUTPATIENT
Start: 2024-09-11 | End: 2024-09-11 | Stop reason: HOSPADM

## 2024-09-11 ASSESSMENT — FIBROSIS 4 INDEX: FIB4 SCORE: 0.68

## 2024-09-11 NOTE — PROGRESS NOTES
Case was canceled due to patient having wound in the left groin, hence risk of graft infection.  Patient was instructed to contact my office to have procedure rescheduled once left wound heals.

## 2024-09-11 NOTE — OR NURSING
Pt has an open wound on her left groin, 2cm x 1.5 cm with other small scratches/abrasions with mod redness and swelling. Dr. Beltran cancelled procedure. Pt to call dr. Beltran's office to reschedule.

## 2024-09-12 ENCOUNTER — APPOINTMENT (OUTPATIENT)
Dept: RADIOLOGY | Facility: MEDICAL CENTER | Age: 59
DRG: 603 | End: 2024-09-12
Attending: EMERGENCY MEDICINE
Payer: MEDICAID

## 2024-09-12 ENCOUNTER — HOSPITAL ENCOUNTER (INPATIENT)
Facility: MEDICAL CENTER | Age: 59
LOS: 2 days | DRG: 603 | End: 2024-09-14
Attending: EMERGENCY MEDICINE | Admitting: HOSPITALIST
Payer: MEDICAID

## 2024-09-12 ENCOUNTER — TELEPHONE (OUTPATIENT)
Dept: VASCULAR SURGERY | Facility: MEDICAL CENTER | Age: 59
End: 2024-09-12
Payer: MEDICAID

## 2024-09-12 DIAGNOSIS — L03.314 CELLULITIS OF LEFT GROIN: ICD-10-CM

## 2024-09-12 DIAGNOSIS — L03.311 ABDOMINAL WALL CELLULITIS: ICD-10-CM

## 2024-09-12 DIAGNOSIS — L03.90 WOUND CELLULITIS: ICD-10-CM

## 2024-09-12 PROBLEM — F17.200 TOBACCO DEPENDENCE: Status: ACTIVE | Noted: 2024-09-12

## 2024-09-12 LAB
ALBUMIN SERPL BCP-MCNC: 4 G/DL (ref 3.2–4.9)
ALBUMIN/GLOB SERPL: 1.4 G/DL
ALP SERPL-CCNC: 116 U/L (ref 30–99)
ALT SERPL-CCNC: 47 U/L (ref 2–50)
ANION GAP SERPL CALC-SCNC: 15 MMOL/L (ref 7–16)
AST SERPL-CCNC: 41 U/L (ref 12–45)
BASOPHILS # BLD AUTO: 0.4 % (ref 0–1.8)
BASOPHILS # BLD: 0.03 K/UL (ref 0–0.12)
BILIRUB SERPL-MCNC: 0.4 MG/DL (ref 0.1–1.5)
BUN SERPL-MCNC: 11 MG/DL (ref 8–22)
CALCIUM ALBUM COR SERPL-MCNC: 9.8 MG/DL (ref 8.5–10.5)
CALCIUM SERPL-MCNC: 9.8 MG/DL (ref 8.4–10.2)
CHLORIDE SERPL-SCNC: 103 MMOL/L (ref 96–112)
CO2 SERPL-SCNC: 24 MMOL/L (ref 20–33)
CREAT SERPL-MCNC: 0.7 MG/DL (ref 0.5–1.4)
CRP SERPL HS-MCNC: 1.74 MG/DL (ref 0–0.75)
EOSINOPHIL # BLD AUTO: 0.09 K/UL (ref 0–0.51)
EOSINOPHIL NFR BLD: 1.3 % (ref 0–6.9)
ERYTHROCYTE [DISTWIDTH] IN BLOOD BY AUTOMATED COUNT: 48.4 FL (ref 35.9–50)
ERYTHROCYTE [SEDIMENTATION RATE] IN BLOOD BY WESTERGREN METHOD: 16 MM/HOUR (ref 0–25)
GFR SERPLBLD CREATININE-BSD FMLA CKD-EPI: 100 ML/MIN/1.73 M 2
GLOBULIN SER CALC-MCNC: 2.9 G/DL (ref 1.9–3.5)
GLUCOSE SERPL-MCNC: 102 MG/DL (ref 65–99)
GRAM STN SPEC: NORMAL
HCT VFR BLD AUTO: 47.7 % (ref 37–47)
HGB BLD-MCNC: 16.1 G/DL (ref 12–16)
IMM GRANULOCYTES # BLD AUTO: 0.02 K/UL (ref 0–0.11)
IMM GRANULOCYTES NFR BLD AUTO: 0.3 % (ref 0–0.9)
LYMPHOCYTES # BLD AUTO: 2.37 K/UL (ref 1–4.8)
LYMPHOCYTES NFR BLD: 33.8 % (ref 22–41)
MCH RBC QN AUTO: 33.8 PG (ref 27–33)
MCHC RBC AUTO-ENTMCNC: 33.8 G/DL (ref 32.2–35.5)
MCV RBC AUTO: 100 FL (ref 81.4–97.8)
MONOCYTES # BLD AUTO: 0.53 K/UL (ref 0–0.85)
MONOCYTES NFR BLD AUTO: 7.6 % (ref 0–13.4)
NEUTROPHILS # BLD AUTO: 3.97 K/UL (ref 1.82–7.42)
NEUTROPHILS NFR BLD: 56.6 % (ref 44–72)
NRBC # BLD AUTO: 0 K/UL
NRBC BLD-RTO: 0 /100 WBC (ref 0–0.2)
PLATELET # BLD AUTO: 250 K/UL (ref 164–446)
PMV BLD AUTO: 10.9 FL (ref 9–12.9)
POTASSIUM SERPL-SCNC: 3.8 MMOL/L (ref 3.6–5.5)
PROT SERPL-MCNC: 6.9 G/DL (ref 6–8.2)
RBC # BLD AUTO: 4.77 M/UL (ref 4.2–5.4)
SCCMEC + MECA PNL NOSE NAA+PROBE: NEGATIVE
SIGNIFICANT IND 70042: NORMAL
SITE SITE: NORMAL
SODIUM SERPL-SCNC: 142 MMOL/L (ref 135–145)
SOURCE SOURCE: NORMAL
WBC # BLD AUTO: 7 K/UL (ref 4.8–10.8)

## 2024-09-12 PROCEDURE — 86140 C-REACTIVE PROTEIN: CPT

## 2024-09-12 PROCEDURE — 700111 HCHG RX REV CODE 636 W/ 250 OVERRIDE (IP): Mod: JZ | Performed by: HOSPITALIST

## 2024-09-12 PROCEDURE — 700105 HCHG RX REV CODE 258: Performed by: HOSPITALIST

## 2024-09-12 PROCEDURE — 700101 HCHG RX REV CODE 250: Performed by: EMERGENCY MEDICINE

## 2024-09-12 PROCEDURE — 87641 MR-STAPH DNA AMP PROBE: CPT

## 2024-09-12 PROCEDURE — 99406 BEHAV CHNG SMOKING 3-10 MIN: CPT | Performed by: HOSPITALIST

## 2024-09-12 PROCEDURE — 80053 COMPREHEN METABOLIC PANEL: CPT

## 2024-09-12 PROCEDURE — 96367 TX/PROPH/DG ADDL SEQ IV INF: CPT

## 2024-09-12 PROCEDURE — 85025 COMPLETE CBC W/AUTO DIFF WBC: CPT

## 2024-09-12 PROCEDURE — 99285 EMERGENCY DEPT VISIT HI MDM: CPT

## 2024-09-12 PROCEDURE — 94760 N-INVAS EAR/PLS OXIMETRY 1: CPT

## 2024-09-12 PROCEDURE — 99223 1ST HOSP IP/OBS HIGH 75: CPT | Mod: 25 | Performed by: HOSPITALIST

## 2024-09-12 PROCEDURE — 770001 HCHG ROOM/CARE - MED/SURG/GYN PRIV*

## 2024-09-12 PROCEDURE — 36415 COLL VENOUS BLD VENIPUNCTURE: CPT

## 2024-09-12 PROCEDURE — 700117 HCHG RX CONTRAST REV CODE 255: Performed by: EMERGENCY MEDICINE

## 2024-09-12 PROCEDURE — 96365 THER/PROPH/DIAG IV INF INIT: CPT

## 2024-09-12 PROCEDURE — 87070 CULTURE OTHR SPECIMN AEROBIC: CPT

## 2024-09-12 PROCEDURE — 96375 TX/PRO/DX INJ NEW DRUG ADDON: CPT

## 2024-09-12 PROCEDURE — 700111 HCHG RX REV CODE 636 W/ 250 OVERRIDE (IP): Performed by: EMERGENCY MEDICINE

## 2024-09-12 PROCEDURE — 96366 THER/PROPH/DIAG IV INF ADDON: CPT

## 2024-09-12 PROCEDURE — 96372 THER/PROPH/DIAG INJ SC/IM: CPT

## 2024-09-12 PROCEDURE — 85652 RBC SED RATE AUTOMATED: CPT

## 2024-09-12 PROCEDURE — 87077 CULTURE AEROBIC IDENTIFY: CPT

## 2024-09-12 PROCEDURE — 700105 HCHG RX REV CODE 258: Performed by: EMERGENCY MEDICINE

## 2024-09-12 PROCEDURE — 74177 CT ABD & PELVIS W/CONTRAST: CPT

## 2024-09-12 PROCEDURE — 96376 TX/PRO/DX INJ SAME DRUG ADON: CPT

## 2024-09-12 PROCEDURE — 700102 HCHG RX REV CODE 250 W/ 637 OVERRIDE(OP): Performed by: HOSPITALIST

## 2024-09-12 PROCEDURE — A9270 NON-COVERED ITEM OR SERVICE: HCPCS | Performed by: HOSPITALIST

## 2024-09-12 PROCEDURE — 700102 HCHG RX REV CODE 250 W/ 637 OVERRIDE(OP): Performed by: EMERGENCY MEDICINE

## 2024-09-12 PROCEDURE — A9270 NON-COVERED ITEM OR SERVICE: HCPCS | Performed by: EMERGENCY MEDICINE

## 2024-09-12 PROCEDURE — 87205 SMEAR GRAM STAIN: CPT

## 2024-09-12 PROCEDURE — 87106 FUNGI IDENTIFICATION YEAST: CPT | Mod: 91

## 2024-09-12 RX ORDER — ROSUVASTATIN CALCIUM 10 MG/1
20 TABLET, COATED ORAL EVERY EVENING
Status: DISCONTINUED | OUTPATIENT
Start: 2024-09-12 | End: 2024-09-14 | Stop reason: HOSPADM

## 2024-09-12 RX ORDER — OXYCODONE HYDROCHLORIDE 5 MG/1
5 TABLET ORAL
Status: DISCONTINUED | OUTPATIENT
Start: 2024-09-12 | End: 2024-09-14 | Stop reason: HOSPADM

## 2024-09-12 RX ORDER — HYDROCHLOROTHIAZIDE 25 MG/1
25 TABLET ORAL EVERY MORNING
Status: DISCONTINUED | OUTPATIENT
Start: 2024-09-13 | End: 2024-09-14 | Stop reason: HOSPADM

## 2024-09-12 RX ORDER — ONDANSETRON 4 MG/1
4 TABLET, ORALLY DISINTEGRATING ORAL EVERY 4 HOURS PRN
Status: DISCONTINUED | OUTPATIENT
Start: 2024-09-12 | End: 2024-09-14 | Stop reason: HOSPADM

## 2024-09-12 RX ORDER — FLUCONAZOLE 200 MG/1
200 TABLET ORAL ONCE
Status: COMPLETED | OUTPATIENT
Start: 2024-09-12 | End: 2024-09-12

## 2024-09-12 RX ORDER — CLOPIDOGREL BISULFATE 75 MG/1
75 TABLET ORAL DAILY
Status: DISCONTINUED | OUTPATIENT
Start: 2024-09-13 | End: 2024-09-14 | Stop reason: HOSPADM

## 2024-09-12 RX ORDER — ONDANSETRON 2 MG/ML
4 INJECTION INTRAMUSCULAR; INTRAVENOUS ONCE
Status: COMPLETED | OUTPATIENT
Start: 2024-09-12 | End: 2024-09-12

## 2024-09-12 RX ORDER — CEPHALEXIN 500 MG/1
500 CAPSULE ORAL 4 TIMES DAILY
Status: ON HOLD | COMMUNITY
Start: 2024-09-03 | End: 2024-09-14

## 2024-09-12 RX ORDER — PREGABALIN 75 MG/1
300 CAPSULE ORAL 2 TIMES DAILY
Status: DISCONTINUED | OUTPATIENT
Start: 2024-09-12 | End: 2024-09-14 | Stop reason: HOSPADM

## 2024-09-12 RX ORDER — LEVOTHYROXINE SODIUM 112 UG/1
112 TABLET ORAL EVERY EVENING
Status: DISCONTINUED | OUTPATIENT
Start: 2024-09-12 | End: 2024-09-14 | Stop reason: HOSPADM

## 2024-09-12 RX ORDER — CYCLOBENZAPRINE HCL 10 MG
5-10 TABLET ORAL 3 TIMES DAILY PRN
Status: DISCONTINUED | OUTPATIENT
Start: 2024-09-12 | End: 2024-09-14 | Stop reason: HOSPADM

## 2024-09-12 RX ORDER — B-COMPLEX WITH VITAMIN C
1 TABLET ORAL DAILY
Status: DISCONTINUED | OUTPATIENT
Start: 2024-09-13 | End: 2024-09-14 | Stop reason: HOSPADM

## 2024-09-12 RX ORDER — DULOXETIN HYDROCHLORIDE 30 MG/1
60 CAPSULE, DELAYED RELEASE ORAL EVERY MORNING
Status: DISCONTINUED | OUTPATIENT
Start: 2024-09-13 | End: 2024-09-14 | Stop reason: HOSPADM

## 2024-09-12 RX ORDER — PROMETHAZINE HYDROCHLORIDE 25 MG/1
12.5-25 SUPPOSITORY RECTAL EVERY 4 HOURS PRN
Status: DISCONTINUED | OUTPATIENT
Start: 2024-09-12 | End: 2024-09-14 | Stop reason: HOSPADM

## 2024-09-12 RX ORDER — MORPHINE SULFATE 4 MG/ML
4 INJECTION INTRAVENOUS ONCE
Status: COMPLETED | OUTPATIENT
Start: 2024-09-12 | End: 2024-09-12

## 2024-09-12 RX ORDER — ENOXAPARIN SODIUM 100 MG/ML
40 INJECTION SUBCUTANEOUS DAILY
Status: DISCONTINUED | OUTPATIENT
Start: 2024-09-12 | End: 2024-09-14 | Stop reason: HOSPADM

## 2024-09-12 RX ORDER — POLYETHYLENE GLYCOL 3350 17 G/17G
1 POWDER, FOR SOLUTION ORAL
Status: DISCONTINUED | OUTPATIENT
Start: 2024-09-12 | End: 2024-09-14 | Stop reason: HOSPADM

## 2024-09-12 RX ORDER — AMOXICILLIN 250 MG
2 CAPSULE ORAL 2 TIMES DAILY
Status: DISCONTINUED | OUTPATIENT
Start: 2024-09-12 | End: 2024-09-14 | Stop reason: HOSPADM

## 2024-09-12 RX ORDER — NICOTINE 21 MG/24HR
21 PATCH, TRANSDERMAL 24 HOURS TRANSDERMAL
Status: DISCONTINUED | OUTPATIENT
Start: 2024-09-13 | End: 2024-09-14 | Stop reason: HOSPADM

## 2024-09-12 RX ORDER — ASCORBIC ACID 500 MG
500 TABLET ORAL 2 TIMES DAILY
Status: DISCONTINUED | OUTPATIENT
Start: 2024-09-12 | End: 2024-09-14 | Stop reason: HOSPADM

## 2024-09-12 RX ORDER — HYDROMORPHONE HYDROCHLORIDE 1 MG/ML
0.25 INJECTION, SOLUTION INTRAMUSCULAR; INTRAVENOUS; SUBCUTANEOUS
Status: DISCONTINUED | OUTPATIENT
Start: 2024-09-12 | End: 2024-09-14 | Stop reason: HOSPADM

## 2024-09-12 RX ORDER — M-VIT,TX,IRON,MINS/CALC/FOLIC 27MG-0.4MG
1 TABLET ORAL DAILY
Status: DISCONTINUED | OUTPATIENT
Start: 2024-09-12 | End: 2024-09-14 | Stop reason: HOSPADM

## 2024-09-12 RX ORDER — LISINOPRIL 20 MG/1
30 TABLET ORAL DAILY
Status: DISCONTINUED | OUTPATIENT
Start: 2024-09-13 | End: 2024-09-14 | Stop reason: HOSPADM

## 2024-09-12 RX ORDER — PROMETHAZINE HYDROCHLORIDE 25 MG/1
12.5-25 TABLET ORAL EVERY 4 HOURS PRN
Status: DISCONTINUED | OUTPATIENT
Start: 2024-09-12 | End: 2024-09-14 | Stop reason: HOSPADM

## 2024-09-12 RX ORDER — HYDROCHLOROTHIAZIDE 25 MG/1
25 TABLET ORAL EVERY MORNING
COMMUNITY

## 2024-09-12 RX ORDER — DULOXETIN HYDROCHLORIDE 60 MG/1
60 CAPSULE, DELAYED RELEASE ORAL EVERY MORNING
COMMUNITY

## 2024-09-12 RX ORDER — ONDANSETRON 2 MG/ML
4 INJECTION INTRAMUSCULAR; INTRAVENOUS EVERY 4 HOURS PRN
Status: DISCONTINUED | OUTPATIENT
Start: 2024-09-12 | End: 2024-09-14 | Stop reason: HOSPADM

## 2024-09-12 RX ORDER — PROCHLORPERAZINE EDISYLATE 5 MG/ML
5-10 INJECTION INTRAMUSCULAR; INTRAVENOUS EVERY 4 HOURS PRN
Status: DISCONTINUED | OUTPATIENT
Start: 2024-09-12 | End: 2024-09-14 | Stop reason: HOSPADM

## 2024-09-12 RX ORDER — ACETAMINOPHEN 325 MG/1
650 TABLET ORAL EVERY 6 HOURS PRN
Status: DISCONTINUED | OUTPATIENT
Start: 2024-09-12 | End: 2024-09-14 | Stop reason: HOSPADM

## 2024-09-12 RX ORDER — OXYCODONE HYDROCHLORIDE 5 MG/1
2.5 TABLET ORAL
Status: DISCONTINUED | OUTPATIENT
Start: 2024-09-12 | End: 2024-09-14 | Stop reason: HOSPADM

## 2024-09-12 RX ADMIN — ENOXAPARIN SODIUM 40 MG: 100 INJECTION SUBCUTANEOUS at 18:46

## 2024-09-12 RX ADMIN — ONDANSETRON 4 MG: 2 INJECTION INTRAMUSCULAR; INTRAVENOUS at 13:27

## 2024-09-12 RX ADMIN — IOHEXOL 80 ML: 350 INJECTION, SOLUTION INTRAVENOUS at 14:23

## 2024-09-12 RX ADMIN — VANCOMYCIN HYDROCHLORIDE 2500 MG: 5 INJECTION, POWDER, LYOPHILIZED, FOR SOLUTION INTRAVENOUS at 14:16

## 2024-09-12 RX ADMIN — Medication 1 TABLET: at 18:32

## 2024-09-12 RX ADMIN — ROSUVASTATIN CALCIUM 20 MG: 10 TABLET, FILM COATED ORAL at 18:32

## 2024-09-12 RX ADMIN — OXYCODONE HYDROCHLORIDE AND ACETAMINOPHEN 500 MG: 500 TABLET ORAL at 18:32

## 2024-09-12 RX ADMIN — MORPHINE SULFATE 4 MG: 4 INJECTION, SOLUTION INTRAMUSCULAR; INTRAVENOUS at 13:27

## 2024-09-12 RX ADMIN — LEVOTHYROXINE SODIUM 112 MCG: 0.11 TABLET ORAL at 18:33

## 2024-09-12 RX ADMIN — PREGABALIN 300 MG: 75 CAPSULE ORAL at 20:55

## 2024-09-12 RX ADMIN — OXYCODONE HYDROCHLORIDE 5 MG: 5 TABLET ORAL at 22:03

## 2024-09-12 RX ADMIN — AMPICILLIN AND SULBACTAM 3 G: 1; 2 INJECTION, POWDER, FOR SOLUTION INTRAMUSCULAR; INTRAVENOUS at 18:46

## 2024-09-12 RX ADMIN — FLUCONAZOLE 200 MG: 200 TABLET ORAL at 13:27

## 2024-09-12 RX ADMIN — MORPHINE SULFATE 4 MG: 4 INJECTION, SOLUTION INTRAMUSCULAR; INTRAVENOUS at 14:28

## 2024-09-12 RX ADMIN — OXYCODONE HYDROCHLORIDE 5 MG: 5 TABLET ORAL at 18:56

## 2024-09-12 RX ADMIN — AMPICILLIN AND SULBACTAM 3 G: 1; 2 INJECTION, POWDER, FOR SOLUTION INTRAMUSCULAR; INTRAVENOUS at 13:27

## 2024-09-12 ASSESSMENT — ENCOUNTER SYMPTOMS
EYE DISCHARGE: 0
COUGH: 0
SHORTNESS OF BREATH: 0
ABDOMINAL PAIN: 0
FEVER: 0
FLANK PAIN: 0
FOCAL WEAKNESS: 0
CHILLS: 0
NERVOUS/ANXIOUS: 0
VOMITING: 0
BRUISES/BLEEDS EASILY: 0
MYALGIAS: 0
EYE REDNESS: 0
STRIDOR: 0

## 2024-09-12 ASSESSMENT — LIFESTYLE VARIABLES
EVER HAD A DRINK FIRST THING IN THE MORNING TO STEADY YOUR NERVES TO GET RID OF A HANGOVER: NO
TOTAL SCORE: 0
CONSUMPTION TOTAL: NEGATIVE
EVER FELT BAD OR GUILTY ABOUT YOUR DRINKING: NO
HAVE YOU EVER FELT YOU SHOULD CUT DOWN ON YOUR DRINKING: NO
TOTAL SCORE: 0
HOW MANY TIMES IN THE PAST YEAR HAVE YOU HAD 5 OR MORE DRINKS IN A DAY: 0
AVERAGE NUMBER OF DAYS PER WEEK YOU HAVE A DRINK CONTAINING ALCOHOL: 1
ON A TYPICAL DAY WHEN YOU DRINK ALCOHOL HOW MANY DRINKS DO YOU HAVE: 1
ALCOHOL_USE: NO
HAVE PEOPLE ANNOYED YOU BY CRITICIZING YOUR DRINKING: NO
TOTAL SCORE: 0

## 2024-09-12 ASSESSMENT — COGNITIVE AND FUNCTIONAL STATUS - GENERAL
SUGGESTED CMS G CODE MODIFIER DAILY ACTIVITY: CH
DAILY ACTIVITIY SCORE: 24
SUGGESTED CMS G CODE MODIFIER MOBILITY: CH
MOBILITY SCORE: 24

## 2024-09-12 ASSESSMENT — SOCIAL DETERMINANTS OF HEALTH (SDOH)
WITHIN THE LAST YEAR, HAVE YOU BEEN HUMILIATED OR EMOTIONALLY ABUSED IN OTHER WAYS BY YOUR PARTNER OR EX-PARTNER?: NO
WITHIN THE LAST YEAR, HAVE TO BEEN RAPED OR FORCED TO HAVE ANY KIND OF SEXUAL ACTIVITY BY YOUR PARTNER OR EX-PARTNER?: NO
WITHIN THE PAST 12 MONTHS, THE FOOD YOU BOUGHT JUST DIDN'T LAST AND YOU DIDN'T HAVE MONEY TO GET MORE: SOMETIMES TRUE
WITHIN THE LAST YEAR, HAVE YOU BEEN KICKED, HIT, SLAPPED, OR OTHERWISE PHYSICALLY HURT BY YOUR PARTNER OR EX-PARTNER?: NO
IN THE PAST 12 MONTHS, HAS THE ELECTRIC, GAS, OIL, OR WATER COMPANY THREATENED TO SHUT OFF SERVICE IN YOUR HOME?: NO
WITHIN THE LAST YEAR, HAVE YOU BEEN AFRAID OF YOUR PARTNER OR EX-PARTNER?: NO
WITHIN THE PAST 12 MONTHS, YOU WORRIED THAT YOUR FOOD WOULD RUN OUT BEFORE YOU GOT THE MONEY TO BUY MORE: SOMETIMES TRUE

## 2024-09-12 ASSESSMENT — FIBROSIS 4 INDEX
FIB4 SCORE: 1.36
FIB4 SCORE: 0.68

## 2024-09-12 ASSESSMENT — PAIN DESCRIPTION - PAIN TYPE: TYPE: ACUTE PAIN

## 2024-09-12 NOTE — ED NOTES
Pharmacy Medication Reconciliation    ~Med rec updated and complete per patient at bedside with Rx bottles. Reviewed Rx bottles with patient and returned at bedside   ~Allergies have been verified and updated  ~Pt home [facility]: [facility]      ~Patient reports that she completed a 7 day course of Keflex that was started on 9/3/2024      ~Anticoagulants (rivaroxaban, apixaban, edoxaban, dabigatran, warfarin, enoxaparin) taken in the last 14 days? No

## 2024-09-12 NOTE — ASSESSMENT & PLAN NOTE
Failed outpatient therapy.  Continue with Unasyn and vancomycin, follow on cultures and sensitivities  Wound care pending  I will start multivitamin, vitamin C to promote wound healing.  Suspect is also related to vascular insufficiency for which she was following with Dr. Beltran

## 2024-09-12 NOTE — ASSESSMENT & PLAN NOTE
I spent 4 minutes on Tobacco cessation education and counseling.   I Discussed the benefits of quitting smoking and risks of continued smoking including cardiovascular disease, cancer and COPD.   Discussed the option of nicotine patch, and gum

## 2024-09-12 NOTE — ED PROVIDER NOTES
ED Provider Note    CHIEF COMPLAINT  Chief Complaint   Patient presents with    Wound Check     For three weeks has a wound in her left groin   Pt has completed two rounds of abx with no relief        EXTERNAL RECORDS REVIEWED  Inpatient Notes patient was seen for a peripheral vascular disease and admitted to the hospital 6/26/2024 to 6/27/2024.  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 female who presents complaining of a nonhealing wound she has had in her left lower abdomen and groin area.  She has been on 2 different antibiotics but she does not know what they were called but and nothing is helping her symptoms.  She denies any fevers or chills.  She states that she has having a lot of pain and there is drainage.  She is not diabetic.  She states she does smoke tobacco.  She has been taking her Plavix and Lipitor as directed.  She states she was supposed to have bypass surgery to her left flank for decreased blood flow by Dr. Beltran but this was canceled because of the wound infection.  She has not noticed any fevers or chills nausea vomiting or diarrhea.    PAST MEDICAL HISTORY   has a past medical history of Arthritis, Back pain, Blood clotting disorder (HCC), Dental disorder, Disorder of thyroid, Falls, and  "Hypertension.    SURGICAL HISTORY   has a past surgical history that includes other abdominal surgery and iliac angioplasty with stent (Left, 5/17/2020).    FAMILY HISTORY  History reviewed. No pertinent family history.    SOCIAL HISTORY  Social History     Tobacco Use    Smoking status: Every Day     Current packs/day: 0.50     Average packs/day: 0.5 packs/day for 44.7 years (22.3 ttl pk-yrs)     Types: Cigarettes     Start date: 1980    Smokeless tobacco: Never   Vaping Use    Vaping status: Some Days    Substances: Nicotine    Devices: Disposable   Substance and Sexual Activity    Alcohol use: Yes    Drug use: No    Sexual activity: Not on file       CURRENT MEDICATIONS  Home Medications       Reviewed by Melia Rosales (Pharmacy Tech) on 09/12/24 at 1445  Med List Status: Complete     Medication Last Dose Status   cephALEXin (KEFLEX) 500 MG Cap 9/12/2024 Active   clopidogrel (PLAVIX) 75 MG Tab 9/12/2024 Active   cyclobenzaprine (FLEXERIL) 5 mg tablet PRN Active   DULoxetine (CYMBALTA) 60 MG Cap DR Particles delayed-release capsule 9/12/2024 Active   hydroCHLOROthiazide 25 MG Tab 9/12/2024 Active   HYDROcodone-acetaminophen (NORCO) 7.5-325 MG tab 9/11/2024 Active   levothyroxine (SYNTHROID) 112 MCG Tab 9/11/2024 Active   lisinopril (PRINIVIL) 30 MG tablet 9/12/2024 Active   pregabalin (LYRICA) 300 MG capsule 9/12/2024 Active   rosuvastatin (CRESTOR) 20 MG Tab RAN OUT Active                  Audit from Redirected Encounters    **Home medications have not yet been reviewed for this encounter**         ALLERGIES  Allergies   Allergen Reactions    Aspirin Rash and Swelling     Generalized rash, swelling in hands and feet       PHYSICAL EXAM  VITAL SIGNS: /74   Pulse 92   Temp 37 °C (98.6 °F) (Temporal)   Resp 20   Ht 1.651 m (5' 5\")   Wt 97.5 kg (214 lb 15.2 oz)   LMP 10/13/2016   SpO2 95%   BMI 35.77 kg/m²      Constitutional: Well developed, Well nourished, uncomfortable, Non-toxic appearance. "   HEENT: Normocephalic, Atraumatic,  external ears normal, pharynx pink,  Mucous  Membranes moist, No rhinorrhea or mucosal edema  Eyes: PERRL, EOMI, Conjunctiva normal, No discharge.   Neck: Normal range of motion, No tenderness, Supple, No stridor.   Lymphatic: No lymphadenopathy    Cardiovascular: Regular Rate and Rhythm, No murmurs,  rubs, or gallops.   Thorax & Lungs: Lungs clear to auscultation bilaterally, No respiratory distress, No wheezes, rhales or rhonchi, No chest wall tenderness.   Abdomen: Bowel sounds normal, Soft, non tender, non distended,  No pulsatile masses., no rebound guarding or peritoneal signs.  Patient has a wound in her left lower abdomen and on her left thigh area with purulent drainage a central ulceration and surrounding erythema of her pannus and left thigh.  The area is tender to palpation there is no fluctuance.  Skin: Warm, Dry, ositive left inguinal and left lower abdominal erythema as above,   Back:  No CVA tenderness,  No spinal tenderness, bony crepitance step offs or instability.   Extremities: Equal, intact distal pulses, No cyanosis, clubbing or edema,  No tenderness.   Musculoskeletal: Good range of motion in all major joints. No tenderness to palpation or major deformities noted.   Neurologic: Alert & oriented No focal deficits noted.  Psychiatric: Affect normal, Judgment normal, Mood normal.      EKG/LABS  Results for orders placed or performed during the hospital encounter of 09/12/24   CBC WITH DIFFERENTIAL   Result Value Ref Range    WBC 7.0 4.8 - 10.8 K/uL    RBC 4.77 4.20 - 5.40 M/uL    Hemoglobin 16.1 (H) 12.0 - 16.0 g/dL    Hematocrit 47.7 (H) 37.0 - 47.0 %    .0 (H) 81.4 - 97.8 fL    MCH 33.8 (H) 27.0 - 33.0 pg    MCHC 33.8 32.2 - 35.5 g/dL    RDW 48.4 35.9 - 50.0 fL    Platelet Count 250 164 - 446 K/uL    MPV 10.9 9.0 - 12.9 fL    Neutrophils-Polys 56.60 44.00 - 72.00 %    Lymphocytes 33.80 22.00 - 41.00 %    Monocytes 7.60 0.00 - 13.40 %    Eosinophils  1.30 0.00 - 6.90 %    Basophils 0.40 0.00 - 1.80 %    Immature Granulocytes 0.30 0.00 - 0.90 %    Nucleated RBC 0.00 0.00 - 0.20 /100 WBC    Neutrophils (Absolute) 3.97 1.82 - 7.42 K/uL    Lymphs (Absolute) 2.37 1.00 - 4.80 K/uL    Monos (Absolute) 0.53 0.00 - 0.85 K/uL    Eos (Absolute) 0.09 0.00 - 0.51 K/uL    Baso (Absolute) 0.03 0.00 - 0.12 K/uL    Immature Granulocytes (abs) 0.02 0.00 - 0.11 K/uL    NRBC (Absolute) 0.00 K/uL   Comp Metabolic Panel   Result Value Ref Range    Sodium 142 135 - 145 mmol/L    Potassium 3.8 3.6 - 5.5 mmol/L    Chloride 103 96 - 112 mmol/L    Co2 24 20 - 33 mmol/L    Anion Gap 15.0 7.0 - 16.0    Glucose 102 (H) 65 - 99 mg/dL    Bun 11 8 - 22 mg/dL    Creatinine 0.70 0.50 - 1.40 mg/dL    Calcium 9.8 8.4 - 10.2 mg/dL    Correct Calcium 9.8 8.5 - 10.5 mg/dL    AST(SGOT) 41 12 - 45 U/L    ALT(SGPT) 47 2 - 50 U/L    Alkaline Phosphatase 116 (H) 30 - 99 U/L    Total Bilirubin 0.4 0.1 - 1.5 mg/dL    Albumin 4.0 3.2 - 4.9 g/dL    Total Protein 6.9 6.0 - 8.2 g/dL    Globulin 2.9 1.9 - 3.5 g/dL    A-G Ratio 1.4 g/dL   Sed Rate   Result Value Ref Range    Sed Rate Westergren 16 0 - 25 mm/hour   CRP QUANTITIVE (NON-CARDIAC)   Result Value Ref Range    Stat C-Reactive Protein 1.74 (H) 0.00 - 0.75 mg/dL   ESTIMATED GFR   Result Value Ref Range    GFR (CKD-EPI) 100 >60 mL/min/1.73 m 2       I have independently interpreted this EKG    RADIOLOGY/PROCEDURES   I have independently interpreted the diagnostic imaging associated with this visit and am waiting the final reading from the radiologist.   My preliminary interpretation is as follows: CT abdomen pelvis no tunneling wounds or abscess    Radiologist interpretation:  CT-ABDOMEN-PELVIS WITH   Final Result         1. Hepatomegaly with fatty infiltration of the liver.   2. Status post cholecystectomy.   3. Atherosclerosis.   4. Fibroid uterus.          COURSE & MEDICAL DECISION MAKING    ASSESSMENT, COURSE AND PLAN  Care Narrative: Reba Deleon  Corey is a 59 y.o. female who presents complaining of a nonhealing wound she has had in her left lower abdomen and groin area.  She has been on 2 different antibiotics but she does not know what they were called but and nothing is helping her symptoms.  She denies any fevers or chills.  She states that she has having a lot of pain and there is drainage.  She is not diabetic.  She states she does smoke tobacco.  She has been taking her Plavix and Lipitor as directed.  She states she was supposed to have bypass surgery to her left flank for decreased blood flow by Dr. Beltran but this was canceled because of the wound infection.  She has not noticed any fevers or chills nausea vomiting or diarrhea.  On physical exam she has an elevated BMI her abdomen is soft she has a left lower inguinal lower abdominal wound with some ulceration and purulent drainage there is surrounding erythema of her pannus and left thigh.  Distally she has warm feet but pulses are hard to Doppler she does have good range of motion and sensation of her toes              ADDITIONAL PROBLEMS MANAGED      DISPOSITION AND DISCUSSIONS    An IV was started negative the patient morphine for pain I discussed her case with pharmacy and we decided to give her a dose of Diflucan and vancomycin and Unasyn to help start treating the infection.  She has failed 2 courses of antibiotics outpatient and her symptoms are worsening.  Cultures were ordered.    Patient's white count is normal at 7 hemoglobin 16.1 she has a normal differential platelet count is normal at 250.  Her glucose is elevated at 102 her electrolytes are normal kidney function normal liver function tests are normal.  CRP is elevated at 1.74.  CT abdomen pelvis does not show any abscess or tunneling or gas soft tissue.    I will admit her to the hospitalist for IV antibiotics and possible infectious disease consultation.    I have discussed management of the patient with the following  physicians and LUC's: Hospitalist     Discussion of management with other QHP or appropriate source(s): Pharmacy regarding antibiotic choice      Escalation of care considered, and ultimately not performed:none    Barriers to care at this time, including but not limited to: Patient has peripheral vascular disease that could contribute to her poor wound healing.     Decision tools and prescription drugs considered including, but not limited to: Antibiotics vancomycin and Unasyn and Diflucan and Pain Medications morphine .    Patient will be admitted in guarded condition.  FINAL DIAGNOSIS  1. Abdominal wall cellulitis    2. Cellulitis of left groin         Electronically signed by: Luly Torres M.D., 9/12/2024 12:58 PM

## 2024-09-12 NOTE — TELEPHONE ENCOUNTER
I called patient and scheduled her for OV with Dr. Beltran. Per Dr. Beltran, pt to come in prior to check out her infected groin prior to scheduling procedure.     Patient stated that her PCP suggested her to go to the ER for the infected groin. Patient stated that she is on her way to the ER.     Patient scheduled for 9/24 OV w/Dr. Beltran.     Patient understands and thanked me for the call.

## 2024-09-12 NOTE — ASSESSMENT & PLAN NOTE
Was scheduled to have surgery with Dr. Beltran earlier this week, but this was canceled due to the infection  Continue rosuvastatin.  Continue Plavix.  Vascular surgery follow-up post discharge

## 2024-09-12 NOTE — ED TRIAGE NOTES
"Chief Complaint   Patient presents with    Wound Check     For three weeks has a wound in her left groin   Pt has completed two rounds of abx with no relief      /74   Pulse 92   Temp 37 °C (98.6 °F) (Temporal)   Resp 20   Ht 1.651 m (5' 5\")   Wt 97.5 kg (214 lb 15.2 oz)   LMP 10/13/2016   SpO2 95%   BMI 35.77 kg/m²     "

## 2024-09-12 NOTE — H&P
Hospital Medicine History & Physical Note    Date of Service  9/12/2024    Primary Care Physician  JAMIE Adame    Consultants  None      Code Status  Full Code    Chief Complaint  Chief Complaint   Patient presents with    Wound Check     For three weeks has a wound in her left groin   Pt has completed two rounds of abx with no relief      History of Presenting Illness  Reba Nicole is a 59 y.o. female with a past medical history of primary hypertension, hyperlipidemia and peripheral vascular disease who presented 9/12/2024 with cellulitis failing outpatient therapy.  The patient has been having nonhealing wounds surrounded by cellulitis of the left lower abdomen and the left groin area.  She has been on two different oral antibiotics in the outpatient setting.  She has about perform vascular bypass tomorrow with Dr. Beltran but the procedure was canceled due to active infection.  The patient was advised to go to the hospital by her vascular surgeon.      I discussed the plan of care with emergency physician, and the patient    Review of Systems  Review of Systems   Constitutional:  Negative for chills and fever.   Eyes:  Negative for discharge and redness.   Respiratory:  Negative for cough, shortness of breath and stridor.    Cardiovascular:  Negative for chest pain and leg swelling.   Gastrointestinal:  Negative for abdominal pain and vomiting.   Genitourinary:  Negative for flank pain.   Musculoskeletal:  Negative for myalgias.        Nonhealing wound surrounded by cellulitis, left groin   Skin:         Nonhealing wound surrounded by redness and swelling, left groin   Neurological:  Negative for focal weakness.   Endo/Heme/Allergies:  Does not bruise/bleed easily.   Psychiatric/Behavioral:  The patient is not nervous/anxious.      Past Medical History   has a past medical history of Arthritis, Back pain, Blood clotting disorder (HCC), Dental disorder, Disorder of thyroid, Falls, and  Hypertension.    Surgical History   has a past surgical history that includes other abdominal surgery and iliac angioplasty with stent (Left, 5/17/2020).     Family History  family history is not on file.      Social History   reports that she has been smoking cigarettes. She started smoking about 44 years ago. She has a 22.3 pack-year smoking history. She has never used smokeless tobacco. She reports current alcohol use. She reports that she does not use drugs.    Allergies  Allergies   Allergen Reactions    Aspirin Rash and Swelling     Generalized rash, swelling in hands and feet     Medications  Prior to Admission Medications   Prescriptions Last Dose Informant Patient Reported? Taking?   DULoxetine (CYMBALTA) 60 MG Cap DR Particles delayed-release capsule 9/12/2024 at 0530 Rx Bottle (For Med Information) Yes Yes   Sig: Take 60 mg by mouth every morning.   HYDROcodone-acetaminophen (NORCO) 7.5-325 MG tab 9/11/2024 at AFTERNOON Patient Yes Yes   Sig: Take 1.5 Tablets by mouth 1 time a day as needed for Severe Pain.     MEDICATION INSTRUCTIONS FOR SURGERY/PROCEDURE SCHEDULED FOR 09-11-24.   OK TO CONTINUE TAKING PRIOR TO SURGERY AND DAY OF SURGERY  Indications: Pain   cephALEXin (KEFLEX) 500 MG Cap 9/12/2024 at COMPLETE Rx Bottle (For Med Information) Yes Yes   Sig: Take 500 mg by mouth 4 times a day. 7 days   clopidogrel (PLAVIX) 75 MG Tab 9/12/2024 at 0530 Rx Bottle (For Med Information) No Yes   Sig: Take 1 Tab by mouth every day.   cyclobenzaprine (FLEXERIL) 5 mg tablet PRN at PRN Rx Bottle (For Med Information) Yes No   Sig: Take 5-10 mg by mouth 3 times a day as needed for Moderate Pain.      Indications: Muscle Spasm   hydroCHLOROthiazide 25 MG Tab 9/12/2024 at 0530 Rx Bottle (For Med Information) Yes Yes   Sig: Take 25 mg by mouth every morning.   levothyroxine (SYNTHROID) 112 MCG Tab 9/11/2024 at PM Rx Bottle (For Med Information) Yes Yes   Sig: Take 112 mcg by mouth every evening.     MEDICATION  INSTRUCTIONS FOR SURGERY/PROCEDURE SCHEDULED FOR 09-11-24.   OK TO CONTINUE TAKING PRIOR TO SURGERY AND DAY OF SURGERY   lisinopril (PRINIVIL) 30 MG tablet 9/12/2024 at 0530 Rx Bottle (For Med Information) No Yes   Sig: Take 1 Tablet by mouth every day.   pregabalin (LYRICA) 300 MG capsule 9/12/2024 at 0530 Rx Bottle (For Med Information) Yes Yes   Sig: Take 300 mg by mouth 2 times a day.     rosuvastatin (CRESTOR) 20 MG Tab RAN OUT at UNK Patient Yes No   Sig: Take 20 mg by mouth every evening.         Facility-Administered Medications: None     Physical Exam  Temp:  [37 °C (98.6 °F)] 37 °C (98.6 °F)  Pulse:  [75-92] 75  Resp:  [20] 20  BP: (105-111)/(54-74) 111/54  SpO2:  [87 %-95 %] 95 %  Blood Pressure: 111/54   Temperature: 37 °C (98.6 °F)   Pulse: 75   Respiration: 20   Pulse Oximetry: 95 %     Physical Exam  Constitutional:       General: She is not in acute distress.  HENT:      Head: Normocephalic and atraumatic.      Right Ear: External ear normal.      Left Ear: External ear normal.      Nose: No congestion or rhinorrhea.      Mouth/Throat:      Mouth: Mucous membranes are moist.      Pharynx: No oropharyngeal exudate or posterior oropharyngeal erythema.   Eyes:      General: No scleral icterus.        Right eye: No discharge.         Left eye: No discharge.      Conjunctiva/sclera: Conjunctivae normal.      Pupils: Pupils are equal, round, and reactive to light.   Cardiovascular:      Rate and Rhythm: Normal rate and regular rhythm.      Heart sounds:      No friction rub. No gallop.   Pulmonary:      Effort: Pulmonary effort is normal.   Abdominal:      General: Abdomen is flat. There is no distension.      Tenderness: There is no guarding.   Musculoskeletal:         General: No swelling.      Cervical back: Neck supple. No rigidity. No muscular tenderness.      Right lower leg: No edema.      Left lower leg: No edema.      Comments: Nonhealing wound surrounded by cellulitis, left groin    Skin:      "Capillary Refill: Capillary refill takes 2 to 3 seconds.      Coloration: Skin is not jaundiced or pale.      Findings: No bruising or erythema.   Neurological:      Mental Status: She is alert and oriented to person, place, and time.   Psychiatric:         Mood and Affect: Mood normal.         Judgment: Judgment normal.             Laboratory:  Recent Labs     09/12/24  1319   WBC 7.0   RBC 4.77   HEMOGLOBIN 16.1*   HEMATOCRIT 47.7*   .0*   MCH 33.8*   MCHC 33.8   RDW 48.4   PLATELETCT 250   MPV 10.9     Recent Labs     09/12/24  1319   SODIUM 142   POTASSIUM 3.8   CHLORIDE 103   CO2 24   GLUCOSE 102*   BUN 11   CREATININE 0.70   CALCIUM 9.8     Recent Labs     09/12/24  1319   ALTSGPT 47   ASTSGOT 41   ALKPHOSPHAT 116*   TBILIRUBIN 0.4   GLUCOSE 102*         No results for input(s): \"NTPROBNP\" in the last 72 hours.      No results for input(s): \"TROPONINT\" in the last 72 hours.    Imaging:  CT-ABDOMEN-PELVIS WITH   Final Result         1. Hepatomegaly with fatty infiltration of the liver.   2. Status post cholecystectomy.   3. Atherosclerosis.   4. Fibroid uterus.        Assessment/Plan:  Justification for Admission Status  I anticipate this patient will require at least two midnights for appropriate medical management, necessitating inpatient admission because cellulitis failed outpatient therapy with oral antibiotics.    Patient will need a Med/Surg bed on MEDICAL service     * Wound cellulitis of the left groin- (present on admission)  Assessment & Plan  Failed outpatient therapy.  I will start Unasyn and vancomycin, follow on cultures and sensitivities  Wound care  I will start multivitamin, vitamin C to promote wound healing.  Counseled to stop smoking    Tobacco dependence- (present on admission)  Assessment & Plan  I spent 4 minutes on Tobacco cessation education and counseling.   I Discussed the benefits of quitting smoking and risks of continued smoking including cardiovascular disease, cancer and " COPD.   Discussed the option of nicotine patch, and gum     Essential hypertension- (present on admission)  Assessment & Plan  I will start lisinopril and hydrochlorothiazide with all parameters    Occlusion of left iliac artery (HCC)- (present on admission)  Assessment & Plan  Was scheduled to have surgery with Dr. Beltran SEP 13, but this was canceled due to the infection  I will start rosuvastatin.  Resume home Plavix.  Counseled to stop smoking    Hypothyroid- (present on admission)  Assessment & Plan  I will start levothyroxine      VTE prophylaxis: SCDs/TEDs and enoxaparin ppx

## 2024-09-12 NOTE — PROGRESS NOTES
"Pharmacy Vancomycin Kinetics Note for 2024     59 y.o. female on Vancomycin day # 1     Vancomycin Indication (AUC Dosing): Uncomplicated infection    Provider specified end date: 24    Active Antibiotics (From admission, onward)      Ordered     Ordering Provider       Thu Sep 12, 2024  3:35 PM    24 1535  ampicillin/sulbactam (Unasyn) 3 g in  mL IVPB  EVERY 6 HOURS         Lynnette Matias M.D.    24 1535  MD Alert...Vancomycin per Pharmacy  PHARMACY TO DOSE        Question:  Indication(s) for vancomycin?  Answer:  Skin and soft tissue infection    Lynnette Matias M.D.       Thu Sep 12, 2024  1:15 PM    24 1315  vancomycin 2500 mg/500mL NS IVPB premix  (vancomycin (VANCOCIN) IV (LD + Maintenance))  ONCE         Luly Torres M.D.            Dosing Weight: 97.5 kg (214 lb 15.2 oz)      Admission History: Admitted on 2024 for Cellulitis [L03.90]  Pertinent history: Patient presents with nonhealing wound of left lower abdomen/ groin. Patient has failed outpatient antibiotics. Of note, wound is producing drainage. Empirically given one dose of fluconazole and started on vancomycin and Unasyn.    Allergies:     Aspirin     Pertinent cultures to date:     Results       Procedure Component Value Units Date/Time    CULTURE WOUND W/ GRAM STAIN [[id number]] Collected: 24 1319    Order Status: Sent Specimen: Wound from Abdominal Updated: 24 1335            Labs:     Estimated Creatinine Clearance: 102.5 mL/min (by C-G formula based on SCr of 0.7 mg/dL).  Recent Labs     24  1319   WBC 7.0   NEUTSPOLYS 56.60     Recent Labs     24  1319   BUN 11   CREATININE 0.70   ALBUMIN 4.0     No intake or output data in the 24 hours ending 24 1548   /54   Pulse 75   Temp 37 °C (98.6 °F) (Temporal)   Resp 20   Ht 1.651 m (5' 5\")   Wt 97.5 kg (214 lb 15.2 oz)   SpO2 95%  Temp (24hrs), Av °C (98.6 °F), Min:37 °C (98.6 °F), Max:37 °C (98.6 °F)      List " concerns for Vancomycin clearance:     Obesity;Receipt of contrast dye    Pharmacokinetics:     AUC kinetics:   Ke (hr ^-1): 0.0874 hr^-1  Half life: 7.93 hr  Clearance: 5.045  Estimated TDD: 2522.5  Estimated Dose: 1041  Estimated interval: 9.9      A/P:     -  Vancomycin dose: Loading dose: 2500 mg once      Maintenance dose: 1250 mg q12 hours    -  Next vancomycin level(s): once at steady state (4-5 doses)    -  Predicted vancomycin AUC from initial AUC test calculator: 496 mg·hr/L      -  Comments: Concerns for accumulation listed above. Empirically started for nonhealing wound, along with Unasyn. Wound culture and MRSA nares ordered. Pharmacy will continue to follow.       Leila Romero, KeiD

## 2024-09-13 LAB
ALBUMIN SERPL BCP-MCNC: 3.6 G/DL (ref 3.2–4.9)
ALBUMIN/GLOB SERPL: 1.4 G/DL
ALP SERPL-CCNC: 104 U/L (ref 30–99)
ALT SERPL-CCNC: 42 U/L (ref 2–50)
ANION GAP SERPL CALC-SCNC: 12 MMOL/L (ref 7–16)
AST SERPL-CCNC: 30 U/L (ref 12–45)
BILIRUB SERPL-MCNC: 0.4 MG/DL (ref 0.1–1.5)
BUN SERPL-MCNC: 12 MG/DL (ref 8–22)
CALCIUM ALBUM COR SERPL-MCNC: 9.1 MG/DL (ref 8.5–10.5)
CALCIUM SERPL-MCNC: 8.8 MG/DL (ref 8.4–10.2)
CHLORIDE SERPL-SCNC: 102 MMOL/L (ref 96–112)
CO2 SERPL-SCNC: 26 MMOL/L (ref 20–33)
CREAT SERPL-MCNC: 0.73 MG/DL (ref 0.5–1.4)
ERYTHROCYTE [DISTWIDTH] IN BLOOD BY AUTOMATED COUNT: 49.6 FL (ref 35.9–50)
GFR SERPLBLD CREATININE-BSD FMLA CKD-EPI: 95 ML/MIN/1.73 M 2
GLOBULIN SER CALC-MCNC: 2.5 G/DL (ref 1.9–3.5)
GLUCOSE SERPL-MCNC: 116 MG/DL (ref 65–99)
HCT VFR BLD AUTO: 43.8 % (ref 37–47)
HGB BLD-MCNC: 14.4 G/DL (ref 12–16)
MAGNESIUM SERPL-MCNC: 1.8 MG/DL (ref 1.5–2.5)
MCH RBC QN AUTO: 33.6 PG (ref 27–33)
MCHC RBC AUTO-ENTMCNC: 32.9 G/DL (ref 32.2–35.5)
MCV RBC AUTO: 102.3 FL (ref 81.4–97.8)
PLATELET # BLD AUTO: 218 K/UL (ref 164–446)
PMV BLD AUTO: 11 FL (ref 9–12.9)
POTASSIUM SERPL-SCNC: 4 MMOL/L (ref 3.6–5.5)
PROT SERPL-MCNC: 6.1 G/DL (ref 6–8.2)
RBC # BLD AUTO: 4.28 M/UL (ref 4.2–5.4)
SODIUM SERPL-SCNC: 140 MMOL/L (ref 135–145)
WBC # BLD AUTO: 7 K/UL (ref 4.8–10.8)

## 2024-09-13 PROCEDURE — 94760 N-INVAS EAR/PLS OXIMETRY 1: CPT

## 2024-09-13 PROCEDURE — A9270 NON-COVERED ITEM OR SERVICE: HCPCS | Performed by: HOSPITALIST

## 2024-09-13 PROCEDURE — 770001 HCHG ROOM/CARE - MED/SURG/GYN PRIV*

## 2024-09-13 PROCEDURE — 700101 HCHG RX REV CODE 250: Performed by: HOSPITALIST

## 2024-09-13 PROCEDURE — 700105 HCHG RX REV CODE 258: Performed by: HOSPITALIST

## 2024-09-13 PROCEDURE — 36415 COLL VENOUS BLD VENIPUNCTURE: CPT

## 2024-09-13 PROCEDURE — 83735 ASSAY OF MAGNESIUM: CPT

## 2024-09-13 PROCEDURE — 85027 COMPLETE CBC AUTOMATED: CPT

## 2024-09-13 PROCEDURE — 700111 HCHG RX REV CODE 636 W/ 250 OVERRIDE (IP): Mod: JZ | Performed by: HOSPITALIST

## 2024-09-13 PROCEDURE — 700102 HCHG RX REV CODE 250 W/ 637 OVERRIDE(OP): Performed by: HOSPITALIST

## 2024-09-13 PROCEDURE — 80053 COMPREHEN METABOLIC PANEL: CPT

## 2024-09-13 PROCEDURE — 99232 SBSQ HOSP IP/OBS MODERATE 35: CPT | Performed by: INTERNAL MEDICINE

## 2024-09-13 RX ADMIN — HYDROMORPHONE HYDROCHLORIDE 0.25 MG: 1 INJECTION, SOLUTION INTRAMUSCULAR; INTRAVENOUS; SUBCUTANEOUS at 10:31

## 2024-09-13 RX ADMIN — OXYCODONE HYDROCHLORIDE 5 MG: 5 TABLET ORAL at 18:03

## 2024-09-13 RX ADMIN — OXYCODONE HYDROCHLORIDE AND ACETAMINOPHEN 500 MG: 500 TABLET ORAL at 05:44

## 2024-09-13 RX ADMIN — PREGABALIN 300 MG: 75 CAPSULE ORAL at 05:44

## 2024-09-13 RX ADMIN — OXYCODONE HYDROCHLORIDE 5 MG: 5 TABLET ORAL at 02:42

## 2024-09-13 RX ADMIN — AMPICILLIN AND SULBACTAM 3 G: 1; 2 INJECTION, POWDER, FOR SOLUTION INTRAMUSCULAR; INTRAVENOUS at 12:06

## 2024-09-13 RX ADMIN — OXYCODONE HYDROCHLORIDE 5 MG: 5 TABLET ORAL at 12:04

## 2024-09-13 RX ADMIN — LEVOTHYROXINE SODIUM 112 MCG: 0.11 TABLET ORAL at 17:18

## 2024-09-13 RX ADMIN — ROSUVASTATIN CALCIUM 20 MG: 10 TABLET, FILM COATED ORAL at 17:18

## 2024-09-13 RX ADMIN — PREGABALIN 300 MG: 75 CAPSULE ORAL at 17:19

## 2024-09-13 RX ADMIN — SENNOSIDES AND DOCUSATE SODIUM 2 TABLET: 50; 8.6 TABLET ORAL at 17:18

## 2024-09-13 RX ADMIN — VANCOMYCIN HYDROCHLORIDE 1250 MG: 5 INJECTION, POWDER, LYOPHILIZED, FOR SOLUTION INTRAVENOUS at 14:30

## 2024-09-13 RX ADMIN — OXYCODONE HYDROCHLORIDE 5 MG: 5 TABLET ORAL at 05:44

## 2024-09-13 RX ADMIN — OXYCODONE HYDROCHLORIDE 5 MG: 5 TABLET ORAL at 21:01

## 2024-09-13 RX ADMIN — AMPICILLIN AND SULBACTAM 3 G: 1; 2 INJECTION, POWDER, FOR SOLUTION INTRAMUSCULAR; INTRAVENOUS at 05:42

## 2024-09-13 RX ADMIN — ENOXAPARIN SODIUM 40 MG: 100 INJECTION SUBCUTANEOUS at 17:22

## 2024-09-13 RX ADMIN — OXYCODONE HYDROCHLORIDE 5 MG: 5 TABLET ORAL at 15:02

## 2024-09-13 RX ADMIN — DULOXETINE HYDROCHLORIDE 60 MG: 30 CAPSULE, DELAYED RELEASE ORAL at 05:44

## 2024-09-13 RX ADMIN — OXYCODONE HYDROCHLORIDE 5 MG: 5 TABLET ORAL at 09:03

## 2024-09-13 RX ADMIN — AMPICILLIN AND SULBACTAM 3 G: 1; 2 INJECTION, POWDER, FOR SOLUTION INTRAMUSCULAR; INTRAVENOUS at 00:09

## 2024-09-13 RX ADMIN — AMPICILLIN AND SULBACTAM 3 G: 1; 2 INJECTION, POWDER, FOR SOLUTION INTRAMUSCULAR; INTRAVENOUS at 17:26

## 2024-09-13 RX ADMIN — SENNOSIDES AND DOCUSATE SODIUM 2 TABLET: 50; 8.6 TABLET ORAL at 05:44

## 2024-09-13 RX ADMIN — Medication 1 TABLET: at 05:44

## 2024-09-13 RX ADMIN — CLOPIDOGREL BISULFATE 75 MG: 75 TABLET ORAL at 05:44

## 2024-09-13 RX ADMIN — OXYCODONE HYDROCHLORIDE AND ACETAMINOPHEN 500 MG: 500 TABLET ORAL at 17:18

## 2024-09-13 RX ADMIN — NICOTINE TRANSDERMAL SYSTEM 21 MG: 21 PATCH, EXTENDED RELEASE TRANSDERMAL at 05:38

## 2024-09-13 RX ADMIN — VANCOMYCIN HYDROCHLORIDE 1250 MG: 5 INJECTION, POWDER, LYOPHILIZED, FOR SOLUTION INTRAVENOUS at 02:03

## 2024-09-13 ASSESSMENT — ENCOUNTER SYMPTOMS
SENSORY CHANGE: 0
SHORTNESS OF BREATH: 0
NERVOUS/ANXIOUS: 0
HEADACHES: 0
PHOTOPHOBIA: 0
FEVER: 0
CONSTIPATION: 0
HEARTBURN: 0
DEPRESSION: 0
WEAKNESS: 0
DIZZINESS: 0
MYALGIAS: 1
VOMITING: 0
CHILLS: 0
DIARRHEA: 0
SPEECH CHANGE: 0
ABDOMINAL PAIN: 0
INSOMNIA: 0
COUGH: 0
CLAUDICATION: 0
BLURRED VISION: 0

## 2024-09-13 ASSESSMENT — PAIN DESCRIPTION - PAIN TYPE
TYPE: ACUTE PAIN

## 2024-09-13 NOTE — CARE PLAN
The patient is Watcher - Medium risk of patient condition declining or worsening         Progress made toward(s) clinical / shift goals:    Problem: Pain - Standard  Goal: Alleviation of pain or a reduction in pain to the patient’s comfort goal  Outcome: Progressing     Problem: Knowledge Deficit - Standard  Goal: Patient and family/care givers will demonstrate understanding of plan of care, disease process/condition, diagnostic tests and medications  Outcome: Progressing     Problem: Infection - Standard  Goal: Patient will remain free from infection  Outcome: Progressing       Patient is not progressing towards the following goals:

## 2024-09-13 NOTE — CARE PLAN
Problem: Pain - Standard  Goal: Alleviation of pain or a reduction in pain to the patient’s comfort goal  Outcome: Progressing     Problem: Infection - Standard  Goal: Patient will remain free from infection  Outcome: Progressing     Problem: Knowledge Deficit - Standard  Goal: Patient and family/care givers will demonstrate understanding of plan of care, disease process/condition, diagnostic tests and medications  Outcome: Progressing   The patient is Stable - Low risk of patient condition declining or worsening    Shift Goals  Clinical Goals: wound care, pain control, iv abx, wean oxygen as tolerated    Progress made toward(s) clinical / shift goals:  updated pt on plan of care, continue iv abx, wound care and pain control. Pt reported 10/10 pain in abdomen/groin area. Medicated with oxycodone per MAR    Patient is not progressing towards the following goals:

## 2024-09-13 NOTE — PROGRESS NOTES
Hospital Medicine Daily Progress Note    Date of Service  9/13/2024    Chief Complaint  Reba Nicole is a 59 y.o. female admitted 9/12/2024 with left groin pain from nonhealing wounds.    Hospital Course  Patient is a 59-year-old female with history of hypertension hyperlipidemia peripheral vascular disease who came with cellulitis of the left groin.  She has been having nonhealing wounds surrounded by cellulitis of the abdomen and left groin area.  She has been on 2 different courses of antibiotics without improvement.  She was supposed to have vascular surgery with Dr. Beltran secondary to left leg arterial insufficiency but was canceled secondary to the infection.  On my evaluation the infection parameters look better than what was described.  She is not warm to touch there is minimal erythema.  Continue with Vanco and Unasyn.  I have also recommended Interdry be placed to keep the 2 wounds separate from 1 another so there is less pain.  Pending wound care evaluation.  I doubt she will have good wound healing in this area because of her vascular insufficiency until she has intervention with Dr. Betlran.    Interval Problem Update  9/13 in good spirits but requesting that her diet be changed to regular.  This was done.  I have requested Interdry be placed in the groin area to keep the 2 wounds separate from each other.  I am not sure how well her wounds will heal until she has a intervention with Dr. Beltran for her arterial insufficiency.  There is no overt sign of infection at this time and if she continues to show improvement then follow-up with Dr. Beltran sooner rather than later would be recommended.    I have discussed this patient's plan of care and discharge plan at IDT rounds today with Case Management, Nursing, Nursing leadership, and other members of the IDT team.    Consultants/Specialty  none    Code Status  Full Code    Disposition  The patient is not medically cleared for discharge to home or a  post-acute facility.  Anticipate discharge to: home with close outpatient follow-up    I have placed the appropriate orders for post-discharge needs.    Review of Systems  Review of Systems   Constitutional:  Negative for chills and fever.   HENT:  Negative for congestion.    Eyes:  Negative for blurred vision and photophobia.   Respiratory:  Negative for cough and shortness of breath.    Cardiovascular:  Negative for chest pain, claudication and leg swelling.   Gastrointestinal:  Negative for abdominal pain, constipation, diarrhea, heartburn and vomiting.   Genitourinary:  Negative for dysuria and hematuria.   Musculoskeletal:  Positive for myalgias (Left groin). Negative for joint pain.   Skin:  Negative for itching and rash.   Neurological:  Negative for dizziness, sensory change, speech change, weakness and headaches.   Psychiatric/Behavioral:  Negative for depression. The patient is not nervous/anxious and does not have insomnia.         Physical Exam  Temp:  [36.3 °C (97.4 °F)-36.6 °C (97.8 °F)] 36.6 °C (97.8 °F)  Pulse:  [64-77] 64  Resp:  [18] 18  BP: (101-113)/(40-68) 112/42  SpO2:  [87 %-95 %] 92 %    Physical Exam  Vitals and nursing note reviewed.   Constitutional:       General: She is not in acute distress.     Appearance: Normal appearance. She is not ill-appearing.   HENT:      Head: Normocephalic and atraumatic.      Nose: Nose normal.   Cardiovascular:      Rate and Rhythm: Normal rate and regular rhythm.      Heart sounds: Normal heart sounds. No murmur heard.  Pulmonary:      Effort: Pulmonary effort is normal.      Breath sounds: Normal breath sounds.   Abdominal:      General: Bowel sounds are normal. There is no distension.      Palpations: Abdomen is soft.   Musculoskeletal:         General: No swelling or tenderness.      Cervical back: Neck supple.   Skin:     General: Skin is warm and dry.      Comments: Patient has two 1 cm lesions that are pressed together when her pannus is folded on  her groin.  There is no significant discharge and there is no obvious signs of infection at this time.     Neurological:      General: No focal deficit present.      Mental Status: She is alert and oriented to person, place, and time.   Psychiatric:         Mood and Affect: Mood normal.         Fluids    Intake/Output Summary (Last 24 hours) at 9/13/2024 1315  Last data filed at 9/13/2024 0903  Gross per 24 hour   Intake 260 ml   Output --   Net 260 ml        Laboratory  Recent Labs     09/12/24  1319 09/13/24  0155   WBC 7.0 7.0   RBC 4.77 4.28   HEMOGLOBIN 16.1* 14.4   HEMATOCRIT 47.7* 43.8   .0* 102.3*   MCH 33.8* 33.6*   MCHC 33.8 32.9   RDW 48.4 49.6   PLATELETCT 250 218   MPV 10.9 11.0     Recent Labs     09/12/24  1319 09/13/24  0155   SODIUM 142 140   POTASSIUM 3.8 4.0   CHLORIDE 103 102   CO2 24 26   GLUCOSE 102* 116*   BUN 11 12   CREATININE 0.70 0.73   CALCIUM 9.8 8.8                   Imaging  CT-ABDOMEN-PELVIS WITH   Final Result         1. Hepatomegaly with fatty infiltration of the liver.   2. Status post cholecystectomy.   3. Atherosclerosis.   4. Fibroid uterus.           Assessment/Plan  * Wound cellulitis of the left groin- (present on admission)  Assessment & Plan  Failed outpatient therapy.  Continue with Unasyn and vancomycin, follow on cultures and sensitivities  Wound care pending  I will start multivitamin, vitamin C to promote wound healing.  Suspect is also related to vascular insufficiency for which she was following with Dr. Beltran    Tobacco dependence- (present on admission)  Assessment & Plan  I spent 4 minutes on Tobacco cessation education and counseling.   I Discussed the benefits of quitting smoking and risks of continued smoking including cardiovascular disease, cancer and COPD.   Discussed the option of nicotine patch, and gum     Hypothyroid- (present on admission)  Assessment & Plan  Home dose levothyroxine    Essential hypertension- (present on admission)  Assessment &  Plan  Home dose lisinopril and hydrochlorothiazide with all parameters    Occlusion of left iliac artery (HCC)- (present on admission)  Assessment & Plan  Was scheduled to have surgery with Dr. Beltran earlier this week, but this was canceled due to the infection  Continue rosuvastatin.  Continue Plavix.  Vascular surgery follow-up post discharge         VTE prophylaxis:   SCDs/TEDs      I have performed a physical exam and reviewed and updated ROS and Plan today (9/13/2024). In review of yesterday's note (9/12/2024), there are no changes except as documented above.

## 2024-09-13 NOTE — HOSPITAL COURSE
Patient is a 59-year-old female with history of hypertension hyperlipidemia peripheral vascular disease who came with cellulitis of the left groin.  She has been having nonhealing wounds surrounded by cellulitis of the abdomen and left groin area.  She has been on 2 different courses of antibiotics without improvement.  She was supposed to have vascular surgery with Dr. Beltran secondary to left leg arterial insufficiency but was canceled secondary to the infection.  On my evaluation the infection parameters look better than what was described.  She is not warm to touch there is minimal erythema.  Continue with Vanco and Unasyn.  I have also recommended Interdry be placed to keep the 2 wounds separate from 1 another so there is less pain.  Pending wound care evaluation.  I doubt she will have good wound healing in this area because of her vascular insufficiency until she has intervention with Dr. Beltran.

## 2024-09-13 NOTE — PROGRESS NOTES
4 Eyes Skin Assessment Completed by RICH Hall and RICH Gandhi.    Head WDL  Ears WDL  Nose WDL  Mouth WDL  Neck WDL  Breast/Chest WDL  Shoulder Blades WDL  Spine WDL  (R) Arm/Elbow/Hand Redness and Blanching  (L) Arm/Elbow/Hand WDL  Abdomen WDL  Groin Redness, Blanching, and Excoriation open wound on left groin    Scrotum/Coccyx/Buttocks WDL  (R) Leg WDL  (L) Leg WDL  (R) Heel/Foot/Toe WDL  (L) Heel/Foot/PinkyToe and lateral foot Redness, Blanching, and Ulcer(s)          Devices In Places Blood Pressure Cuff and Pulse Ox      Interventions In Place N/A    Possible Skin Injury Yes    Pictures Uploaded Into Epic Yes  Wound Consult Placed Yes  RN Wound Prevention Protocol Ordered No

## 2024-09-14 VITALS
RESPIRATION RATE: 18 BRPM | DIASTOLIC BLOOD PRESSURE: 74 MMHG | WEIGHT: 210.1 LBS | SYSTOLIC BLOOD PRESSURE: 148 MMHG | OXYGEN SATURATION: 95 % | BODY MASS INDEX: 35 KG/M2 | HEART RATE: 60 BPM | HEIGHT: 65 IN | TEMPERATURE: 98.1 F

## 2024-09-14 LAB
ALBUMIN SERPL BCP-MCNC: 2.6 G/DL (ref 3.2–4.9)
ANION GAP SERPL CALC-SCNC: 10 MMOL/L (ref 7–16)
ANION GAP SERPL CALC-SCNC: 27 MMOL/L (ref 7–16)
BACTERIA WND AEROBE CULT: NORMAL
BUN SERPL-MCNC: 8 MG/DL (ref 8–22)
BUN SERPL-MCNC: 8 MG/DL (ref 8–22)
CALCIUM SERPL-MCNC: 6.4 MG/DL (ref 8.4–10.2)
CALCIUM SERPL-MCNC: 9.2 MG/DL (ref 8.4–10.2)
CHLORIDE SERPL-SCNC: 103 MMOL/L (ref 96–112)
CHLORIDE SERPL-SCNC: 107 MMOL/L (ref 96–112)
CO2 SERPL-SCNC: 21 MMOL/L (ref 20–33)
CO2 SERPL-SCNC: 26 MMOL/L (ref 20–33)
CREAT SERPL-MCNC: 0.7 MG/DL (ref 0.5–1.4)
CREAT SERPL-MCNC: 0.83 MG/DL (ref 0.5–1.4)
ERYTHROCYTE [DISTWIDTH] IN BLOOD BY AUTOMATED COUNT: 47.2 FL (ref 35.9–50)
GFR SERPLBLD CREATININE-BSD FMLA CKD-EPI: 100 ML/MIN/1.73 M 2
GFR SERPLBLD CREATININE-BSD FMLA CKD-EPI: 82 ML/MIN/1.73 M 2
GLUCOSE SERPL-MCNC: 121 MG/DL (ref 65–99)
GLUCOSE SERPL-MCNC: 96 MG/DL (ref 65–99)
GRAM STN SPEC: NORMAL
HCT VFR BLD AUTO: 35.1 % (ref 37–47)
HGB BLD-MCNC: 11.6 G/DL (ref 12–16)
MCH RBC QN AUTO: 33.9 PG (ref 27–33)
MCHC RBC AUTO-ENTMCNC: 33 G/DL (ref 32.2–35.5)
MCV RBC AUTO: 102.6 FL (ref 81.4–97.8)
PLATELET # BLD AUTO: 174 K/UL (ref 164–446)
PMV BLD AUTO: 11.1 FL (ref 9–12.9)
POTASSIUM SERPL-SCNC: 3.1 MMOL/L (ref 3.6–5.5)
POTASSIUM SERPL-SCNC: 4.4 MMOL/L (ref 3.6–5.5)
RBC # BLD AUTO: 3.42 M/UL (ref 4.2–5.4)
SIGNIFICANT IND 70042: NORMAL
SITE SITE: NORMAL
SODIUM SERPL-SCNC: 139 MMOL/L (ref 135–145)
SODIUM SERPL-SCNC: 155 MMOL/L (ref 135–145)
SOURCE SOURCE: NORMAL
WBC # BLD AUTO: 4.9 K/UL (ref 4.8–10.8)

## 2024-09-14 PROCEDURE — 82040 ASSAY OF SERUM ALBUMIN: CPT

## 2024-09-14 PROCEDURE — 700102 HCHG RX REV CODE 250 W/ 637 OVERRIDE(OP): Performed by: HOSPITALIST

## 2024-09-14 PROCEDURE — 85027 COMPLETE CBC AUTOMATED: CPT

## 2024-09-14 PROCEDURE — A9270 NON-COVERED ITEM OR SERVICE: HCPCS | Performed by: HOSPITALIST

## 2024-09-14 PROCEDURE — 700111 HCHG RX REV CODE 636 W/ 250 OVERRIDE (IP): Performed by: HOSPITALIST

## 2024-09-14 PROCEDURE — 700105 HCHG RX REV CODE 258: Performed by: HOSPITALIST

## 2024-09-14 PROCEDURE — 80048 BASIC METABOLIC PNL TOTAL CA: CPT

## 2024-09-14 PROCEDURE — 99239 HOSP IP/OBS DSCHRG MGMT >30: CPT | Performed by: INTERNAL MEDICINE

## 2024-09-14 PROCEDURE — 36415 COLL VENOUS BLD VENIPUNCTURE: CPT

## 2024-09-14 PROCEDURE — 700101 HCHG RX REV CODE 250: Performed by: HOSPITALIST

## 2024-09-14 RX ADMIN — CLOPIDOGREL BISULFATE 75 MG: 75 TABLET ORAL at 05:15

## 2024-09-14 RX ADMIN — VANCOMYCIN HYDROCHLORIDE 1250 MG: 5 INJECTION, POWDER, LYOPHILIZED, FOR SOLUTION INTRAVENOUS at 01:59

## 2024-09-14 RX ADMIN — AMPICILLIN AND SULBACTAM 3 G: 1; 2 INJECTION, POWDER, FOR SOLUTION INTRAMUSCULAR; INTRAVENOUS at 05:25

## 2024-09-14 RX ADMIN — Medication 1 TABLET: at 05:15

## 2024-09-14 RX ADMIN — AMPICILLIN AND SULBACTAM 3 G: 1; 2 INJECTION, POWDER, FOR SOLUTION INTRAMUSCULAR; INTRAVENOUS at 12:11

## 2024-09-14 RX ADMIN — DULOXETINE HYDROCHLORIDE 60 MG: 30 CAPSULE, DELAYED RELEASE ORAL at 05:15

## 2024-09-14 RX ADMIN — SENNOSIDES AND DOCUSATE SODIUM 2 TABLET: 50; 8.6 TABLET ORAL at 05:16

## 2024-09-14 RX ADMIN — PREGABALIN 300 MG: 75 CAPSULE ORAL at 05:16

## 2024-09-14 RX ADMIN — AMPICILLIN AND SULBACTAM 3 G: 1; 2 INJECTION, POWDER, FOR SOLUTION INTRAMUSCULAR; INTRAVENOUS at 00:16

## 2024-09-14 RX ADMIN — LISINOPRIL 30 MG: 20 TABLET ORAL at 05:15

## 2024-09-14 RX ADMIN — OXYCODONE HYDROCHLORIDE 5 MG: 5 TABLET ORAL at 07:25

## 2024-09-14 RX ADMIN — HYDROCHLOROTHIAZIDE 25 MG: 25 TABLET ORAL at 05:15

## 2024-09-14 RX ADMIN — NICOTINE TRANSDERMAL SYSTEM 21 MG: 21 PATCH, EXTENDED RELEASE TRANSDERMAL at 05:14

## 2024-09-14 RX ADMIN — Medication 1 TABLET: at 05:16

## 2024-09-14 RX ADMIN — OXYCODONE HYDROCHLORIDE AND ACETAMINOPHEN 500 MG: 500 TABLET ORAL at 05:16

## 2024-09-14 ASSESSMENT — PAIN DESCRIPTION - PAIN TYPE
TYPE: ACUTE PAIN

## 2024-09-14 ASSESSMENT — FIBROSIS 4 INDEX: FIB4 SCORE: 1.52

## 2024-09-14 NOTE — DISCHARGE SUMMARY
Discharge Summary    CHIEF COMPLAINT ON ADMISSION  Chief Complaint   Patient presents with    Wound Check     For three weeks has a wound in her left groin   Pt has completed two rounds of abx with no relief        Reason for Admission  Wound Check     Admission Date  9/12/2024    CODE STATUS  Full Code    HPI & HOSPITAL COURSE  This is Patient is a 59-year-old female with history of hypertension hyperlipidemia peripheral vascular disease who came with cellulitis of the left groin.  She has been having nonhealing wounds surrounded by cellulitis of the abdomen and left groin area.  She has been on 2 different courses of antibiotics without improvement.  She was supposed to have vascular surgery with Dr. Beltran secondary to left leg arterial insufficiency but was canceled secondary to the infection.  On my evaluation the infection parameters look better than what was described.  She is not warm to touch there is minimal erythema.  Continue with Vanco and Unasyn.  I have also recommended Interdry be placed to keep the 2 wounds separate from 1 another so there is less pain.  Pending wound care evaluation.  I doubt she will have good wound healing in this area because of her vascular insufficiency until she has intervention with Dr. Beltran.    For patient's groin wound, she was treated with IV Unasyn for 3 days and vancomycin.  Her MRSA nares PCR was negative.  Abdominal wound Gram stain was showing no organisms only WBCs.  Patient's had no leukocytosis.  As patient's wound was superficial but we do not have a sample which is causing her infection, I prescribed Augmentin for 11 days which would complete a total of 14 days.  She will need full treatment in order to have any potential vascular surgery.  It is likely her wound is having a difficult time healing due to her left iliac artery occlusion.  Patient is to follow-up with Dr. Beltran for potential surgical intervention.    Therefore, she is discharged in good and  "stable condition to home with close outpatient follow-up.    The patient met 2-midnight criteria for an inpatient stay at the time of discharge.    Discharge Date  9/14/2024    FOLLOW UP ITEMS POST DISCHARGE  With primary care provider and Dr. Fortune with vascular surgery    DISCHARGE DIAGNOSES  Principal Problem:    Wound cellulitis of the left groin (POA: Yes)  Active Problems:    Occlusion of left iliac artery (HCC) (POA: Yes)    Essential hypertension (POA: Yes)    Hypothyroid (POA: Yes)    Tobacco dependence (POA: Yes)  Resolved Problems:    * No resolved hospital problems. *      FOLLOW UP  Future Appointments   Date Time Provider Department Center   9/24/2024  2:45 PM Julio César Beltran M.D. [facility] None     [facility]  [address]  [facility] [address]  [phone]    Schedule an appointment as soon as possible for a visit on 9/16/2024  Please follow-up for a \"posthospital visit\"  -Please repeat labs including CBC and BMP  - Please follow-up for your left groin/inguinal wound.  Please consider home health.  I have provided a referral to wound care clinic if needed.  -Please discuss antibiotics, you have been discharged with Augmentin as IV Unasyn was sufficient treatment in the hospital.    [facility]  [address]  [facility] [address]  [phone]  Call  Please call if you are not able to get home health.  You will need evaluation on your wound at the [facility] wound care clinic.  Referral was given on discharge.    Julio César Beltran M.D.  [address] [id number] [phone]    Go to  Please go to your next appointment for your left iliac artery occlusion surgery.      MEDICATIONS ON DISCHARGE     Medication List        START taking these medications        Instructions   amoxicillin-clavulanate 875-125 MG Tabs  Commonly known as: Augmentin   Take 1 Tablet by mouth 2 times a day for 11 days.  Dose: 1 Tablet            CONTINUE taking these medications "        Instructions   clopidogrel 75 MG Tabs  Commonly known as: Plavix   Take 1 Tab by mouth every day.  Dose: 75 mg     cyclobenzaprine 5 mg tablet  Commonly known as: Flexeril   Take 5-10 mg by mouth 3 times a day as needed for Moderate Pain.      Indications: Muscle Spasm  Dose: 5-10 mg     DULoxetine 60 MG Cpep delayed-release capsule  Commonly known as: Cymbalta   Take 60 mg by mouth every morning.  Dose: 60 mg     hydroCHLOROthiazide 25 MG Tabs   Take 25 mg by mouth every morning.  Dose: 25 mg     HYDROcodone-acetaminophen 7.5-325 MG tab  Commonly known as: Norco   Take 1.5 Tablets by mouth 1 time a day as needed for Severe Pain.     MEDICATION INSTRUCTIONS FOR SURGERY/PROCEDURE SCHEDULED FOR 09-11-24.   OK TO CONTINUE TAKING PRIOR TO SURGERY AND DAY OF SURGERY  Indications: Pain  Dose: 1.5 Tablet     levothyroxine 112 MCG Tabs  Commonly known as: Synthroid   Take 112 mcg by mouth every evening.     MEDICATION INSTRUCTIONS FOR SURGERY/PROCEDURE SCHEDULED FOR 09-11-24.   OK TO CONTINUE TAKING PRIOR TO SURGERY AND DAY OF SURGERY  Dose: 112 mcg     lisinopril 30 MG tablet  Commonly known as: Prinivil   Take 1 Tablet by mouth every day.  Dose: 30 mg     pregabalin 300 MG capsule  Commonly known as: Lyrica   Take 300 mg by mouth 2 times a day.  Dose: 300 mg     rosuvastatin 20 MG Tabs  Commonly known as: Crestor   Take 20 mg by mouth every evening.   Dose: 20 mg            STOP taking these medications      cephALEXin 500 MG Caps  Commonly known as: Keflex              Allergies  Allergies   Allergen Reactions    Aspirin Rash and Swelling     Generalized rash, swelling in hands and feet       DIET  Orders Placed This Encounter   Procedures    Diet Order Diet: Regular     Standing Status:   Standing     Number of Occurrences:   1     Order Specific Question:   Diet:     Answer:   Regular [1]       ACTIVITY  As tolerated.  Weight bearing as tolerated    CONSULTATIONS  None    PROCEDURES  None    LABORATORY  Lab  Results   Component Value Date    SODIUM 139 09/14/2024    POTASSIUM 4.4 09/14/2024    CHLORIDE 103 09/14/2024    CO2 26 09/14/2024    GLUCOSE 121 (H) 09/14/2024    BUN 8 09/14/2024    CREATININE 0.70 09/14/2024        Lab Results   Component Value Date    WBC 4.9 09/14/2024    HEMOGLOBIN 11.6 (L) 09/14/2024    HEMATOCRIT 35.1 (L) 09/14/2024    PLATELETCT 174 09/14/2024      CT-ABDOMEN-PELVIS WITH   Final Result         1. Hepatomegaly with fatty infiltration of the liver.   2. Status post cholecystectomy.   3. Atherosclerosis.   4. Fibroid uterus.          Total time of the discharge process exceeds 35 minutes.

## 2024-09-14 NOTE — CARE PLAN
The patient is Stable - Low risk of patient condition declining or worsening    Shift Goals  Clinical Goals: Pain control, IV abx, patient remains free from falls  Patient Goals: Pain control, sleep  Family Goals: АЛЕКСАНДР    Progress made toward(s) clinical / shift goals:    Patient remains free from falls this shift. Patient receiving IV abx per MAR. Pain controlled by medication per MAR, rest, repositioning and ice packs.    Patient is not progressing towards the following goals:

## 2024-09-14 NOTE — PROGRESS NOTES
Bedside report received by day RICH Lewis and assumed care of patient. Resting in bed, no complaints of pain. A&O x4 on RA. Educated on fall risk, all fall precautions in place. Call light within reach, bed locked and in lowest position, denied other needs at this time. Hourly rounding in progress.

## 2024-09-16 ENCOUNTER — TELEPHONE (OUTPATIENT)
Dept: HEALTH INFORMATION MANAGEMENT | Facility: OTHER | Age: 59
End: 2024-09-16
Payer: MEDICAID

## 2024-09-24 ENCOUNTER — OFFICE VISIT (OUTPATIENT)
Dept: VASCULAR SURGERY | Facility: MEDICAL CENTER | Age: 59
End: 2024-09-24
Payer: MEDICAID

## 2024-09-24 VITALS
WEIGHT: 210 LBS | BODY MASS INDEX: 34.99 KG/M2 | DIASTOLIC BLOOD PRESSURE: 72 MMHG | HEART RATE: 107 BPM | HEIGHT: 65 IN | OXYGEN SATURATION: 96 % | TEMPERATURE: 96.9 F | SYSTOLIC BLOOD PRESSURE: 150 MMHG

## 2024-09-24 DIAGNOSIS — S31.104A NON-HEALING OPEN WOUND OF LEFT GROIN, INITIAL ENCOUNTER: ICD-10-CM

## 2024-09-24 PROCEDURE — 3077F SYST BP >= 140 MM HG: CPT | Performed by: SURGERY

## 2024-09-24 PROCEDURE — 3078F DIAST BP <80 MM HG: CPT | Performed by: SURGERY

## 2024-09-24 PROCEDURE — 99212 OFFICE O/P EST SF 10 MIN: CPT | Performed by: SURGERY

## 2024-09-24 ASSESSMENT — FIBROSIS 4 INDEX: FIB4 SCORE: 1.52

## 2024-09-24 NOTE — PROGRESS NOTES
"                 VASCULAR SURGERY                    Postop Note  _________________________________    9/24/2024    Ms. Nicole comes to the clinic today for left groin wound check.  She was scheduled to undergo angiogram with possible endovascular intervention and right to left femoral-femoral bypass on September 11, 2024; however, the procedure could not be performed since the patient developed an infected left groin wound.  She has been on antibiotics.    On follow-up today, she denies fever, chills, nausea, vomiting.    Vitals  BP (!) 150/72 (BP Location: Left arm, Patient Position: Sitting, BP Cuff Size: Adult)   Pulse (!) 107   Temp 36.1 °C (96.9 °F) (Temporal)   Ht 1.651 m (5' 5\")   Wt 95.3 kg (210 lb)   LMP 10/13/2016   SpO2 96%   BMI 34.95 kg/m²     Exam  General: Pleasant overweight female in nonapparent distress.  Left groin: 1 cm superficial ulcer with no surrounding erythema, drainage, or fluctuation.      DiagnosisAssessment: Left groin wound.      Plan:    I had a long discussion with patient.  Again, I told patient that it would be best to wait until the wound completely healed to avoid problem with graft infection.  Patient indicated understanding.  She told me that she would have to delay the surgery for at least a few more weeks anyway since her father just recently passed away.  I gave patient supplies and instructed her to paint the left groin wound with Betadine and cover with dry gauze once a day.  I asked patient to follow-up with me in 2 to 3 weeks for wound check.  Patient knows to come to the emergency room if her groin wound gets worse in the meantime or if she developed fever, chills, nausea, or vomiting.  I counseled patient to stop smoking as well.    The patient demonstrates a clear understanding of our discussion and agrees.         Julio César Beltran M.D.  [facility]  [phone]  [address], [facility] [address]  "

## 2024-10-17 ENCOUNTER — APPOINTMENT (OUTPATIENT)
Dept: RADIOLOGY | Facility: MEDICAL CENTER | Age: 59
End: 2024-10-17
Attending: EMERGENCY MEDICINE
Payer: MEDICAID

## 2024-10-17 ENCOUNTER — HOSPITAL ENCOUNTER (EMERGENCY)
Facility: MEDICAL CENTER | Age: 59
End: 2024-10-17
Attending: EMERGENCY MEDICINE
Payer: MEDICAID

## 2024-10-17 VITALS
SYSTOLIC BLOOD PRESSURE: 149 MMHG | TEMPERATURE: 97.6 F | WEIGHT: 216.71 LBS | HEART RATE: 68 BPM | OXYGEN SATURATION: 96 % | DIASTOLIC BLOOD PRESSURE: 60 MMHG | RESPIRATION RATE: 16 BRPM | BODY MASS INDEX: 36.11 KG/M2 | HEIGHT: 65 IN

## 2024-10-17 DIAGNOSIS — L03.90 CELLULITIS, UNSPECIFIED CELLULITIS SITE: ICD-10-CM

## 2024-10-17 LAB
ALBUMIN SERPL BCP-MCNC: 4.4 G/DL (ref 3.2–4.9)
ALBUMIN/GLOB SERPL: 1.5 G/DL
ALP SERPL-CCNC: 122 U/L (ref 30–99)
ALT SERPL-CCNC: 44 U/L (ref 2–50)
ANION GAP SERPL CALC-SCNC: 14 MMOL/L (ref 7–16)
AST SERPL-CCNC: 27 U/L (ref 12–45)
BASOPHILS # BLD AUTO: 0.7 % (ref 0–1.8)
BASOPHILS # BLD: 0.05 K/UL (ref 0–0.12)
BILIRUB SERPL-MCNC: 0.4 MG/DL (ref 0.1–1.5)
BUN SERPL-MCNC: 10 MG/DL (ref 8–22)
CALCIUM ALBUM COR SERPL-MCNC: 9.4 MG/DL (ref 8.5–10.5)
CALCIUM SERPL-MCNC: 9.7 MG/DL (ref 8.4–10.2)
CHLORIDE SERPL-SCNC: 102 MMOL/L (ref 96–112)
CO2 SERPL-SCNC: 23 MMOL/L (ref 20–33)
CREAT SERPL-MCNC: 0.73 MG/DL (ref 0.5–1.4)
EOSINOPHIL # BLD AUTO: 0.14 K/UL (ref 0–0.51)
EOSINOPHIL NFR BLD: 1.8 % (ref 0–6.9)
ERYTHROCYTE [DISTWIDTH] IN BLOOD BY AUTOMATED COUNT: 45.8 FL (ref 35.9–50)
GFR SERPLBLD CREATININE-BSD FMLA CKD-EPI: 95 ML/MIN/1.73 M 2
GLOBULIN SER CALC-MCNC: 2.9 G/DL (ref 1.9–3.5)
GLUCOSE SERPL-MCNC: 104 MG/DL (ref 65–99)
HCT VFR BLD AUTO: 46.9 % (ref 37–47)
HGB BLD-MCNC: 15.6 G/DL (ref 12–16)
IMM GRANULOCYTES # BLD AUTO: 0.02 K/UL (ref 0–0.11)
IMM GRANULOCYTES NFR BLD AUTO: 0.3 % (ref 0–0.9)
LACTATE SERPL-SCNC: 1.2 MMOL/L (ref 0.5–2)
LYMPHOCYTES # BLD AUTO: 2.59 K/UL (ref 1–4.8)
LYMPHOCYTES NFR BLD: 34.2 % (ref 22–41)
MCH RBC QN AUTO: 33.2 PG (ref 27–33)
MCHC RBC AUTO-ENTMCNC: 33.3 G/DL (ref 32.2–35.5)
MCV RBC AUTO: 99.8 FL (ref 81.4–97.8)
MONOCYTES # BLD AUTO: 0.51 K/UL (ref 0–0.85)
MONOCYTES NFR BLD AUTO: 6.7 % (ref 0–13.4)
NEUTROPHILS # BLD AUTO: 4.27 K/UL (ref 1.82–7.42)
NEUTROPHILS NFR BLD: 56.3 % (ref 44–72)
NRBC # BLD AUTO: 0 K/UL
NRBC BLD-RTO: 0 /100 WBC (ref 0–0.2)
PLATELET # BLD AUTO: 250 K/UL (ref 164–446)
PMV BLD AUTO: 11.1 FL (ref 9–12.9)
POTASSIUM SERPL-SCNC: 3.8 MMOL/L (ref 3.6–5.5)
PROT SERPL-MCNC: 7.3 G/DL (ref 6–8.2)
RBC # BLD AUTO: 4.7 M/UL (ref 4.2–5.4)
SODIUM SERPL-SCNC: 139 MMOL/L (ref 135–145)
WBC # BLD AUTO: 7.6 K/UL (ref 4.8–10.8)

## 2024-10-17 PROCEDURE — 96374 THER/PROPH/DIAG INJ IV PUSH: CPT

## 2024-10-17 PROCEDURE — 700117 HCHG RX CONTRAST REV CODE 255: Performed by: EMERGENCY MEDICINE

## 2024-10-17 PROCEDURE — 99284 EMERGENCY DEPT VISIT MOD MDM: CPT

## 2024-10-17 PROCEDURE — 700101 HCHG RX REV CODE 250: Performed by: EMERGENCY MEDICINE

## 2024-10-17 PROCEDURE — 36415 COLL VENOUS BLD VENIPUNCTURE: CPT

## 2024-10-17 PROCEDURE — 74177 CT ABD & PELVIS W/CONTRAST: CPT

## 2024-10-17 PROCEDURE — 700111 HCHG RX REV CODE 636 W/ 250 OVERRIDE (IP): Mod: JZ | Performed by: EMERGENCY MEDICINE

## 2024-10-17 PROCEDURE — 80053 COMPREHEN METABOLIC PANEL: CPT

## 2024-10-17 PROCEDURE — 85025 COMPLETE CBC W/AUTO DIFF WBC: CPT

## 2024-10-17 PROCEDURE — 83605 ASSAY OF LACTIC ACID: CPT

## 2024-10-17 RX ORDER — HYDROXYZINE HYDROCHLORIDE 25 MG/1
25 TABLET, FILM COATED ORAL 3 TIMES DAILY PRN
COMMUNITY
Start: 2024-10-04

## 2024-10-17 RX ORDER — CLONIDINE HYDROCHLORIDE 0.1 MG/1
0.1 TABLET ORAL 3 TIMES DAILY PRN
COMMUNITY

## 2024-10-17 RX ORDER — LIDOCAINE HYDROCHLORIDE 20 MG/ML
JELLY TOPICAL ONCE
Status: COMPLETED | OUTPATIENT
Start: 2024-10-17 | End: 2024-10-17

## 2024-10-17 RX ADMIN — FENTANYL CITRATE 50 MCG: 50 INJECTION, SOLUTION INTRAMUSCULAR; INTRAVENOUS at 12:07

## 2024-10-17 RX ADMIN — LIDOCAINE HYDROCHLORIDE 6 ML: 20 JELLY TOPICAL at 12:04

## 2024-10-17 RX ADMIN — IOHEXOL 100 ML: 350 INJECTION, SOLUTION INTRAVENOUS at 12:56

## 2024-10-17 ASSESSMENT — PAIN DESCRIPTION - PAIN TYPE
TYPE: ACUTE PAIN
TYPE: ACUTE PAIN

## 2024-10-17 ASSESSMENT — FIBROSIS 4 INDEX: FIB4 SCORE: 1.52

## 2024-10-24 ENCOUNTER — APPOINTMENT (OUTPATIENT)
Dept: VASCULAR SURGERY | Facility: MEDICAL CENTER | Age: 59
End: 2024-10-24
Payer: MEDICAID

## 2024-10-24 ENCOUNTER — HOSPITAL ENCOUNTER (EMERGENCY)
Facility: MEDICAL CENTER | Age: 59
End: 2024-10-24
Attending: EMERGENCY MEDICINE
Payer: MEDICAID

## 2024-10-24 VITALS
HEART RATE: 81 BPM | SYSTOLIC BLOOD PRESSURE: 133 MMHG | OXYGEN SATURATION: 94 % | TEMPERATURE: 97.2 F | RESPIRATION RATE: 18 BRPM | DIASTOLIC BLOOD PRESSURE: 78 MMHG

## 2024-10-24 DIAGNOSIS — Z51.89 ENCOUNTER FOR WOUND RE-CHECK: ICD-10-CM

## 2024-10-24 DIAGNOSIS — L03.90 WOUND CELLULITIS: ICD-10-CM

## 2024-10-24 LAB
ALBUMIN SERPL BCP-MCNC: 4 G/DL (ref 3.2–4.9)
ALBUMIN/GLOB SERPL: 1.4 G/DL
ALP SERPL-CCNC: 111 U/L (ref 30–99)
ALT SERPL-CCNC: 35 U/L (ref 2–50)
ANION GAP SERPL CALC-SCNC: 14 MMOL/L (ref 7–16)
AST SERPL-CCNC: 27 U/L (ref 12–45)
BASOPHILS # BLD AUTO: 0.6 % (ref 0–1.8)
BASOPHILS # BLD: 0.05 K/UL (ref 0–0.12)
BILIRUB SERPL-MCNC: 0.4 MG/DL (ref 0.1–1.5)
BUN SERPL-MCNC: 10 MG/DL (ref 8–22)
CALCIUM ALBUM COR SERPL-MCNC: 9.3 MG/DL (ref 8.5–10.5)
CALCIUM SERPL-MCNC: 9.3 MG/DL (ref 8.4–10.2)
CHLORIDE SERPL-SCNC: 103 MMOL/L (ref 96–112)
CO2 SERPL-SCNC: 21 MMOL/L (ref 20–33)
CREAT SERPL-MCNC: 0.48 MG/DL (ref 0.5–1.4)
EOSINOPHIL # BLD AUTO: 0.18 K/UL (ref 0–0.51)
EOSINOPHIL NFR BLD: 2.2 % (ref 0–6.9)
ERYTHROCYTE [DISTWIDTH] IN BLOOD BY AUTOMATED COUNT: 47.2 FL (ref 35.9–50)
GFR SERPLBLD CREATININE-BSD FMLA CKD-EPI: 110 ML/MIN/1.73 M 2
GLOBULIN SER CALC-MCNC: 2.8 G/DL (ref 1.9–3.5)
GLUCOSE SERPL-MCNC: 111 MG/DL (ref 65–99)
GRAM STN SPEC: NORMAL
HCT VFR BLD AUTO: 43.5 % (ref 37–47)
HGB BLD-MCNC: 14.7 G/DL (ref 12–16)
IMM GRANULOCYTES # BLD AUTO: 0.03 K/UL (ref 0–0.11)
IMM GRANULOCYTES NFR BLD AUTO: 0.4 % (ref 0–0.9)
LACTATE SERPL-SCNC: 1.9 MMOL/L (ref 0.5–2)
LYMPHOCYTES # BLD AUTO: 2.64 K/UL (ref 1–4.8)
LYMPHOCYTES NFR BLD: 32.6 % (ref 22–41)
MCH RBC QN AUTO: 33.8 PG (ref 27–33)
MCHC RBC AUTO-ENTMCNC: 33.8 G/DL (ref 32.2–35.5)
MCV RBC AUTO: 100 FL (ref 81.4–97.8)
MONOCYTES # BLD AUTO: 0.54 K/UL (ref 0–0.85)
MONOCYTES NFR BLD AUTO: 6.7 % (ref 0–13.4)
NEUTROPHILS # BLD AUTO: 4.67 K/UL (ref 1.82–7.42)
NEUTROPHILS NFR BLD: 57.5 % (ref 44–72)
NRBC # BLD AUTO: 0 K/UL
NRBC BLD-RTO: 0 /100 WBC (ref 0–0.2)
PLATELET # BLD AUTO: 266 K/UL (ref 164–446)
PMV BLD AUTO: 10.7 FL (ref 9–12.9)
POTASSIUM SERPL-SCNC: 4.5 MMOL/L (ref 3.6–5.5)
PROT SERPL-MCNC: 6.8 G/DL (ref 6–8.2)
RBC # BLD AUTO: 4.35 M/UL (ref 4.2–5.4)
SIGNIFICANT IND 70042: NORMAL
SITE SITE: NORMAL
SODIUM SERPL-SCNC: 138 MMOL/L (ref 135–145)
SOURCE SOURCE: NORMAL
WBC # BLD AUTO: 8.1 K/UL (ref 4.8–10.8)

## 2024-10-24 PROCEDURE — 87205 SMEAR GRAM STAIN: CPT

## 2024-10-24 PROCEDURE — 36415 COLL VENOUS BLD VENIPUNCTURE: CPT

## 2024-10-24 PROCEDURE — 83605 ASSAY OF LACTIC ACID: CPT

## 2024-10-24 PROCEDURE — 85025 COMPLETE CBC W/AUTO DIFF WBC: CPT

## 2024-10-24 PROCEDURE — 700102 HCHG RX REV CODE 250 W/ 637 OVERRIDE(OP): Performed by: EMERGENCY MEDICINE

## 2024-10-24 PROCEDURE — 87077 CULTURE AEROBIC IDENTIFY: CPT

## 2024-10-24 PROCEDURE — 99284 EMERGENCY DEPT VISIT MOD MDM: CPT

## 2024-10-24 PROCEDURE — 87040 BLOOD CULTURE FOR BACTERIA: CPT

## 2024-10-24 PROCEDURE — A9270 NON-COVERED ITEM OR SERVICE: HCPCS | Performed by: EMERGENCY MEDICINE

## 2024-10-24 PROCEDURE — 87070 CULTURE OTHR SPECIMN AEROBIC: CPT

## 2024-10-24 PROCEDURE — 80053 COMPREHEN METABOLIC PANEL: CPT

## 2024-10-24 RX ORDER — HYDROCODONE BITARTRATE AND ACETAMINOPHEN 5; 325 MG/1; MG/1
1 TABLET ORAL ONCE
Status: COMPLETED | OUTPATIENT
Start: 2024-10-24 | End: 2024-10-24

## 2024-10-24 RX ADMIN — HYDROCODONE BITARTRATE AND ACETAMINOPHEN 1 TABLET: 5; 325 TABLET ORAL at 11:36

## 2024-10-24 ASSESSMENT — PAIN DESCRIPTION - PAIN TYPE: TYPE: ACUTE PAIN

## 2024-10-26 LAB
BACTERIA WND AEROBE CULT: NORMAL
GRAM STN SPEC: NORMAL
SIGNIFICANT IND 70042: NORMAL
SITE SITE: NORMAL
SOURCE SOURCE: NORMAL

## 2024-10-29 ENCOUNTER — OFFICE VISIT (OUTPATIENT)
Dept: WOUND CARE | Facility: MEDICAL CENTER | Age: 59
End: 2024-10-29
Attending: EMERGENCY MEDICINE
Payer: MEDICAID

## 2024-10-29 ENCOUNTER — HOSPITAL ENCOUNTER (OUTPATIENT)
Facility: MEDICAL CENTER | Age: 59
End: 2024-10-29
Attending: NURSE PRACTITIONER
Payer: MEDICAID

## 2024-10-29 VITALS
TEMPERATURE: 97.3 F | DIASTOLIC BLOOD PRESSURE: 80 MMHG | OXYGEN SATURATION: 93 % | RESPIRATION RATE: 18 BRPM | HEART RATE: 88 BPM | SYSTOLIC BLOOD PRESSURE: 140 MMHG

## 2024-10-29 DIAGNOSIS — L08.9 WOUND INFECTION: ICD-10-CM

## 2024-10-29 DIAGNOSIS — I73.9 PAD (PERIPHERAL ARTERY DISEASE) (HCC): ICD-10-CM

## 2024-10-29 DIAGNOSIS — L98.492 SKIN ULCER OF LEFT GROIN WITH FAT LAYER EXPOSED (HCC): ICD-10-CM

## 2024-10-29 DIAGNOSIS — L97.523 TOE ULCER, LEFT, WITH NECROSIS OF MUSCLE (HCC): ICD-10-CM

## 2024-10-29 DIAGNOSIS — F17.200 NICOTINE USE DISORDER: ICD-10-CM

## 2024-10-29 DIAGNOSIS — T14.8XXA WOUND INFECTION: ICD-10-CM

## 2024-10-29 LAB
BACTERIA BLD CULT: NORMAL
SIGNIFICANT IND 70042: NORMAL
SITE SITE: NORMAL
SOURCE SOURCE: NORMAL

## 2024-10-29 PROCEDURE — 3077F SYST BP >= 140 MM HG: CPT | Performed by: NURSE PRACTITIONER

## 2024-10-29 PROCEDURE — 11042 DBRDMT SUBQ TIS 1ST 20SQCM/<: CPT

## 2024-10-29 PROCEDURE — 87205 SMEAR GRAM STAIN: CPT

## 2024-10-29 PROCEDURE — 99406 BEHAV CHNG SMOKING 3-10 MIN: CPT | Mod: 25 | Performed by: NURSE PRACTITIONER

## 2024-10-29 PROCEDURE — 99214 OFFICE O/P EST MOD 30 MIN: CPT | Mod: 25 | Performed by: NURSE PRACTITIONER

## 2024-10-29 PROCEDURE — 99214 OFFICE O/P EST MOD 30 MIN: CPT

## 2024-10-29 PROCEDURE — 87070 CULTURE OTHR SPECIMN AEROBIC: CPT

## 2024-10-29 PROCEDURE — 11042 DBRDMT SUBQ TIS 1ST 20SQCM/<: CPT | Performed by: NURSE PRACTITIONER

## 2024-10-29 PROCEDURE — 3079F DIAST BP 80-89 MM HG: CPT | Performed by: NURSE PRACTITIONER

## 2024-10-29 RX ORDER — HYDROCODONE BITARTRATE AND ACETAMINOPHEN 7.5; 325 MG/1; MG/1
1 TABLET ORAL 2 TIMES DAILY PRN
COMMUNITY
Start: 2024-10-23

## 2024-10-29 RX ORDER — CYCLOBENZAPRINE HCL 5 MG
TABLET ORAL
COMMUNITY
Start: 2024-10-18

## 2024-10-29 RX ORDER — SULFAMETHOXAZOLE AND TRIMETHOPRIM 800; 160 MG/1; MG/1
1 TABLET ORAL 2 TIMES DAILY
Qty: 20 TABLET | Refills: 0 | Status: SHIPPED | OUTPATIENT
Start: 2024-10-29 | End: 2024-11-08

## 2024-10-29 NOTE — PROGRESS NOTES
Advanced Wound Care   [facility]   [address]   [address]   [place] [address]   [phone] Fax: [phone]      Duration of Supply Order: 90 Days  Dispense as written.    Wound 10/29/24 Full Thickness Wound Left lateral abdomen/groin (Active)   Wound Image    10/29/24 1500   Site Assessment Red;Pink;Yellow;Painful 10/29/24 1500   Periwound Assessment Scar tissue;Blanchable erythema 10/29/24 1500   Margins Unattached edges;Undefined edges 10/29/24 1500   Closure Secondary intention 10/29/24 1500   Drainage Amount Moderate 10/29/24 1500   Drainage Description Serosanguineous 10/29/24 1500   Treatments Topical Lidocaine;Cleansed;Provider debridement;Site care 10/29/24 1500   Wound Cleansing Hypochlorus Acid 10/29/24 1500   Periwound Protectant Barrier Paste 10/29/24 1500   Dressing Status Old drainage 10/29/24 1500   Dressing Changed New 10/29/24 1500   Dressing Cleansing/Solutions Not Applicable 10/29/24 1500   Dressing Options Honey Gel;Hydrofiber;Offloading Dressing - Sacral 10/29/24 1500   Dressing Change/Treatment Frequency Every 72 hrs, and As Needed 10/29/24 1500   Wound Team Following Weekly 10/29/24 1500   Non-staged Wound Description Full thickness 10/29/24 1500   Wound Length (cm) 1 cm 10/29/24 1500   Wound Width (cm) 7.3 cm 10/29/24 1500   Wound Surface Area (cm^2) 7.3 cm^2 10/29/24 1500   Post-Procedure Length (cm) 1.1 cm 10/29/24 1500   Post-Procedure Width (cm) 7.3 cm 10/29/24 1500   Post-Procedure Depth (cm) 0.4 cm 10/29/24 1500   Post-Procedure Surface Area (cm^2) 8.03 cm^2 10/29/24 1500   Post-Procedure Volume (cm^3) 3.212 cm^3 10/29/24 1500   Tunneling (cm) 0 cm 10/29/24 1500   Undermining (cm) 0 cm 10/29/24 1500   Wound Odor None 10/29/24 1500   Pulses Left;DP;PT;Not palpable;Doppler 10/29/24 1500   Exposed Structures None 10/29/24 1500       Wound 10/29/24 Full Thickness Wound Left anterior thigh/groin (Active)   Wound Image   10/29/24 6255    Site Assessment Red;Pink;Yellow;Painful 10/29/24 1500   Periwound Assessment Scar tissue 10/29/24 1500   Margins Attached edges 10/29/24 1500   Closure Secondary intention 10/29/24 1500   Drainage Amount Moderate 10/29/24 1500   Drainage Description Serosanguineous 10/29/24 1500   Treatments Topical Lidocaine;Cleansed;Provider debridement;Site care 10/29/24 1500   Wound Cleansing Hypochlorus Acid 10/29/24 1500   Periwound Protectant Barrier Paste 10/29/24 1500   Dressing Status Old drainage 10/29/24 1500   Dressing Changed New 10/29/24 1500   Dressing Cleansing/Solutions Not Applicable 10/29/24 1500   Dressing Options Honey Gel;Hydrofiber;Offloading Dressing - Sacral 10/29/24 1500   Dressing Change/Treatment Frequency Every 72 hrs, and As Needed 10/29/24 1500   Wound Length (cm) 2 cm 10/29/24 1500   Wound Width (cm) 2 cm 10/29/24 1500   Wound Depth (cm) 0.1 cm 10/29/24 1500   Wound Surface Area (cm^2) 4 cm^2 10/29/24 1500   Wound Volume (cm^3) 0.4 cm^3 10/29/24 1500   Tunneling (cm) 0 cm 10/29/24 1500   Undermining (cm) 0 cm 10/29/24 1500   Wound Odor None 10/29/24 1500   Pulses Left;DP;PT;Doppler;Not palpable 10/29/24 1500   Exposed Structures None 10/29/24 1500       Wound 10/29/24 Full Thickness Wound Left 4th plantar toe (Active)   Wound Image    10/29/24 1500   Site Assessment Yellow;White;Red 10/29/24 1500   Periwound Assessment Maceration;Warm;Blanchable erythema 10/29/24 1500   Margins Unattached edges 10/29/24 1500   Closure Secondary intention 10/29/24 1500   Drainage Amount Moderate 10/29/24 1500   Drainage Description Serosanguineous 10/29/24 1500   Treatments Topical Lidocaine;Cleansed;Site care 10/29/24 1500   Wound Cleansing Normal Saline Irrigation 10/29/24 1500   Periwound Protectant No-sting Skin Prep 10/29/24 1500   Dressing Status Old drainage 10/29/24 1500   Dressing Changed New 10/29/24 1500   Dressing Cleansing/Solutions Not Applicable 10/29/24 1500   Dressing Options Honey  Gel;Hydrofiber;Hypafix Tape 10/29/24 1500   Dressing Change/Treatment Frequency Every 72 hrs, and As Needed 10/29/24 1500   Wound Team Following Weekly 10/29/24 1500   Non-staged Wound Description Full thickness 10/29/24 1500   Wound Length (cm) 0.3 cm 10/29/24 1500   Wound Width (cm) 0.6 cm 10/29/24 1500   Wound Depth (cm) 0.4 cm 10/29/24 1500   Wound Surface Area (cm^2) 0.18 cm^2 10/29/24 1500   Wound Volume (cm^3) 0.072 cm^3 10/29/24 1500   Tunneling (cm) 0 cm 10/29/24 1500   Undermining (cm) 0 cm 10/29/24 1500   Wound Odor None 10/29/24 1500   Pulses Left;DP;PT;Doppler;Not palpable 10/29/24 1500   Exposed Structures Tendon 10/29/24 1500     Offloading sacral dressing is secondary cover dressing only due to drainage and area where wound is. Not used for offloading.     PROCEDURE: Excisional debridement by provider using curette to remove <2 cm2 nonviable tissue from left lateral abdomen/groin wound bed. 2% topical Lidocaine applied prior to debridement with ~5 minute dwell time. Patient tolerated well; denied pain.

## 2024-10-29 NOTE — PROGRESS NOTES
Provider Encounter- Lower Extremity Ulcer      HISTORY OF PRESENT ILLNESS  Wound History:    START OF CARE IN CLINIC: 10/29/2024    REFERRING PROVIDER: Henok Alcantara MD     WOUND ETIOLOGY: Arterial   LOCATION: Left groin, left lower abdomen     Left plantar fourth toe: Arterial, trauma     HISTORY: Patient developed recurrent rest pain to left leg as well as numbness to her left first and fifth toes around June 2024.  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  She followed up with Dr. Beltran on 8/29/2024 and plan was to undergo angiogram with intervention.  If endovascular intervention not possible, patient to undergo right to left femorofemoral bypass.  Procedure was scheduled for 9/11/2024, unfortunately patient developed an ulcer to her left groin and left lower abdomen.  Case was canceled.  She followed back up with Dr. Beltran on 9/24/2024 to assess left groin ulcers since she had been on antibiotics.  She was applying mupirocin ointment which was stinging.  Surgery was delayed until ulcer completely healed to avoid graft infection.  She was instructed to apply Betadine and cover with dry gauze daily.  She had placed a sheet in between her pannus to keep the area dry.  She went to the ED on 10/17/2024 secondary to pain and worsening of ulceration.  She was given a second course of antibiotics.  She returned to ED on 10/24/2024 as of wound and pain continued to worsen.  She was referred to [facility] wound care clinic for further treatment care.    Additionally patient also developed an ulcer to her left fourth toe after patient felt a lump that was causing her pain in between her third and fourth toes.  She  self drained with a clean needle on 10/28/2024.  Patient did not notice any purulence only scant bleeding.  Today she presents with an ulcer with exposed FDL of the fourth toe.    Pertinent Medical History: Hypertension, tobacco abuse, PVD,  hyperlipidemia   ETIOLOGY HISTORY: Diagnosed: 2020. Vascular Surgeon: [facility]. Previous vascular procedures left iliac artery stenting by Dr Gimenez. Compression: None.         TOBACCO USE: Half pack per day    Patient's problem list, allergies, and current medications reviewed and updated in Epic    Interval History:  10/29/2024: Initial wound evaluation, initial provider Clinic visit with Meg GONSALES, FNP-BC, CWON, CFBEVERLY.  Pt denies fevers, chills, nausea, vomiting.  Chronic nonhealing ulcer left groin and thigh.  New ulcer to left plantar fourth toe.  Severe arterial disease primarily to left side.  Ulcers worsening per patient.  Has follow-up with Dr. Beltran 11/5/24.  Concerned she will not be able to make the appointment secondary to transportation issues.  Encourage patient to attend appointment versus going to ER if her condition significantly worsens.  Wound culture collected of left fourth toe, tendon exposed.  Started on Bactrim DS as she just finished a course of Augmentin yesterday.        REVIEW OF SYSTEMS:   Review of Systems   Constitutional:  Negative for chills and fever.   Eyes:         Denies visual impairment   Respiratory:  Negative for cough and shortness of breath.    Cardiovascular:  Positive for claudication (Left leg). Negative for chest pain, palpitations and leg swelling.   Gastrointestinal:  Negative for constipation, diarrhea, nausea and vomiting.   Genitourinary:  Negative for dysuria.   Musculoskeletal:  Negative for falls and joint pain.   Skin:         Wound groin left side, left fourth toe wound   Neurological:  Positive for sensory change (Hypersensitive left leg).   Psychiatric/Behavioral:  Negative for depression. The patient is not nervous/anxious.          PHYSICAL EXAMINATION:   BP (!) 140/80   Pulse 88   Temp 36.3 °C (97.3 °F) (Temporal)   Resp 18   LMP 10/13/2016   SpO2 93%     Physical Exam  Vitals reviewed.   Constitutional:        Appearance: Normal appearance.   Cardiovascular:      Rate and Rhythm: Normal rate.      Pulses: Normal pulses.      Comments: No palpable pedal pulse to PT and DP on L foot  Mono on L pop with Doppler  Mono L femoral with Doppler  Pulmonary:      Effort: Pulmonary effort is normal.   Musculoskeletal:         General: Swelling present.      Right lower leg: Edema present.      Left lower leg: Edema present.   Skin:     Comments: Left groin, left lower abdomen: Complicated by arterial disease, full-thickness, adherent slough, peripheral skin breakdown.  No evidence of infection    Left plantar fourth toe proximal aspect: Full-thickness, tendon exposed, unable to probe to bone.  Erythema extending greater than 0.5 cm from wound.  Foot is hot.   Neurological:      Mental Status: She is alert.   Psychiatric:         Mood and Affect: Mood normal.         WOUND ASSESSMENT  Wound 10/29/24 Full Thickness Wound Left lateral abdomen/groin (Active)   Wound Image    10/29/24 1500   Site Assessment Red;Pink;Yellow;Painful 10/29/24 1500   Periwound Assessment Scar tissue;Blanchable erythema 10/29/24 1500   Margins Unattached edges;Undefined edges 10/29/24 1500   Closure Secondary intention 10/29/24 1500   Drainage Amount Moderate 10/29/24 1500   Drainage Description Serosanguineous 10/29/24 1500   Treatments Topical Lidocaine;Cleansed;Provider debridement;Site care 10/29/24 1500   Wound Cleansing Hypochlorus Acid 10/29/24 1500   Periwound Protectant Barrier Paste 10/29/24 1500   Dressing Status Old drainage 10/29/24 1500   Dressing Changed New 10/29/24 1500   Dressing Cleansing/Solutions Not Applicable 10/29/24 1500   Dressing Options Honey Gel;Hydrofiber;Offloading Dressing - Sacral 10/29/24 1500   Dressing Change/Treatment Frequency Every 72 hrs, and As Needed 10/29/24 1500   Wound Team Following Weekly 10/29/24 1500   Non-staged Wound Description Full thickness 10/29/24 1500   Wound Length (cm) 1 cm 10/29/24 1500   Wound Width  (cm) 7.3 cm 10/29/24 1500   Wound Surface Area (cm^2) 7.3 cm^2 10/29/24 1500   Post-Procedure Length (cm) 1.1 cm 10/29/24 1500   Post-Procedure Width (cm) 7.3 cm 10/29/24 1500   Post-Procedure Depth (cm) 0.4 cm 10/29/24 1500   Post-Procedure Surface Area (cm^2) 8.03 cm^2 10/29/24 1500   Post-Procedure Volume (cm^3) 3.212 cm^3 10/29/24 1500   Tunneling (cm) 0 cm 10/29/24 1500   Undermining (cm) 0 cm 10/29/24 1500   Wound Odor None 10/29/24 1500   Pulses Left;DP;PT;Not palpable;Doppler 10/29/24 1500   Exposed Structures None 10/29/24 1500   Number of days: 6       Wound 10/29/24 Full Thickness Wound Left anterior thigh/groin (Active)   Wound Image   10/29/24 1500   Site Assessment Red;Pink;Yellow;Painful 10/29/24 1500   Periwound Assessment Scar tissue 10/29/24 1500   Margins Attached edges 10/29/24 1500   Closure Secondary intention 10/29/24 1500   Drainage Amount Moderate 10/29/24 1500   Drainage Description Serosanguineous 10/29/24 1500   Treatments Topical Lidocaine;Cleansed;Provider debridement;Site care 10/29/24 1500   Wound Cleansing Hypochlorus Acid 10/29/24 1500   Periwound Protectant Barrier Paste 10/29/24 1500   Dressing Status Old drainage 10/29/24 1500   Dressing Changed New 10/29/24 1500   Dressing Cleansing/Solutions Not Applicable 10/29/24 1500   Dressing Options Honey Gel;Hydrofiber;Offloading Dressing - Sacral 10/29/24 1500   Dressing Change/Treatment Frequency Every 72 hrs, and As Needed 10/29/24 1500   Wound Length (cm) 2 cm 10/29/24 1500   Wound Width (cm) 2 cm 10/29/24 1500   Wound Depth (cm) 0.1 cm 10/29/24 1500   Wound Surface Area (cm^2) 4 cm^2 10/29/24 1500   Wound Volume (cm^3) 0.4 cm^3 10/29/24 1500   Tunneling (cm) 0 cm 10/29/24 1500   Undermining (cm) 0 cm 10/29/24 1500   Wound Odor None 10/29/24 1500   Pulses Left;DP;PT;Doppler;Not palpable 10/29/24 1500   Exposed Structures None 10/29/24 1500   Number of days: 6       Wound 10/29/24 Full Thickness Wound Left 4th plantar toe (Active)    Wound Image    10/29/24 1500   Site Assessment Yellow;White;Red 10/29/24 1500   Periwound Assessment Maceration;Warm;Blanchable erythema 10/29/24 1500   Margins Unattached edges 10/29/24 1500   Closure Secondary intention 10/29/24 1500   Drainage Amount Moderate 10/29/24 1500   Drainage Description Serosanguineous 10/29/24 1500   Treatments Topical Lidocaine;Cleansed;Site care 10/29/24 1500   Wound Cleansing Normal Saline Irrigation 10/29/24 1500   Periwound Protectant No-sting Skin Prep 10/29/24 1500   Dressing Status Old drainage 10/29/24 1500   Dressing Changed New 10/29/24 1500   Dressing Cleansing/Solutions Not Applicable 10/29/24 1500   Dressing Options Honey Gel;Hydrofiber;Hypafix Tape 10/29/24 1500   Dressing Change/Treatment Frequency Every 72 hrs, and As Needed 10/29/24 1500   Wound Team Following Weekly 10/29/24 1500   Non-staged Wound Description Full thickness 10/29/24 1500   Wound Length (cm) 0.3 cm 10/29/24 1500   Wound Width (cm) 0.6 cm 10/29/24 1500   Wound Depth (cm) 0.4 cm 10/29/24 1500   Wound Surface Area (cm^2) 0.18 cm^2 10/29/24 1500   Wound Volume (cm^3) 0.072 cm^3 10/29/24 1500   Tunneling (cm) 0 cm 10/29/24 1500   Undermining (cm) 0 cm 10/29/24 1500   Wound Odor None 10/29/24 1500   Pulses Left;DP;PT;Doppler;Not palpable 10/29/24 1500   Exposed Structures Tendon 10/29/24 1500   Number of days: 6           PROCEDURE: Excisional debridement of left thigh, left abdomen, left fourth toe  -2% viscous lidocaine applied topically to wound bed for approximately 5 minutes prior to debridement  -Curette used to excise nonviable tissue from wound bed.  Excisional debridement was performed to remove devitalized tissue until healthy, bleeding tissue was visualized.  Was not able to completely debride the entire surface area of the ulcers secondary to pain, total area debrided approximately 2 cm²  debrided.  Tissue debrided into the subcutaneous layer.    -Bleeding controlled with manual pressure.    -Wound care completed by wound RN, refer to wound flowsheet   -Patient tolerated the procedure well, without c/o of significant pain or discomfort.       Pertinent Labs and Diagnostics:    Labs:   A1c:   Lab Results   Component Value Date/Time    HBA1C 5.6 2020 12:57 AM            IMAGING: See epic    VASCULAR STUDIES:   Vascular Studies Resulted in Past Year US-EXTREMITY ARTERY LOWER UNILAT LEFT    Result Date: 2024  Narrative Lower Extremity  Arterial Duplex Report  Vascular Laboratory  CONCLUSIONS  1. There is no hemodynamically significant stenosis in the left lower  extremity. There is 3 vessel runoff to the ankle.  RUBEN CASANOVA  Exam Date:     2024 16:06  Room #:     Inpatient  Priority:     Stat  Ht (in):     65      Wt (lb):     208  Ordering Physician:        SUNNY FANG  Referring Physician:       ANNALISA Gonzalez  Sonographer:               Montana Rodriguez RDCS, T  Study Type:  Technical Quality:         Adequate  Age:    59    Gender:     F  MRN:    [id number]  :    1965      BSA:    2.01  Indications:     Pain in Limb  CPT Codes:       97537  ICD Codes:       729.5  History:         Previous Vascular Surgery - Angioplasty and or Stent                   placement  on the left leg.  Limitations:                RIGHT  Waveform        Peak Systolic Velocity (cm/s)                  Prox    Prox-Mid  Mid    Mid-Dist  Distal  Triphasic                         159                      CFA                                                             PFA                                                             SFA                                                             POP                                                             AT                                                             PT                                                             AMARI                LEFT  Waveform        Peak Systolic Velocity (cm/s)                   Prox    Prox-Mid  Mid    Mid-Dist  Distal  Monophasic                        118                      CFA  Monophasic      38                                         PFA  Monophasic      79                48               38      SFA  Monophasic                        22                       POP  Monophasic      20                                 28      AT  Monophasic      21                                 15      PT  Monophasic      21                                 9       AMARI  FINDINGS  Left.  There is mild diffuse plaque and monophasic flow throughout the common  femoral, superficial femoral, and popliteal arteries with no  hemodynamically significant stenosis.  Three vessel runoff to the ankle with monophasic  flow and low amplitude.  The three vessels are patent.  Josué Rodríguez MD  (Electronically Signed)  Final Date:      23 June 2024                   16:57      LAST  WOUND CULTURE:  DATE :    Lab Results   Component Value Date/Time    CULTRSULT Rare growth usual skin karen. 10/29/2024 04:07 PM              ASSESSMENT AND PLAN:     1. Skin ulcer of left groin with fat layer exposed ([facility])  10/29/2024: Started around September 2024.  Complicated by arterial disease.  - Significant slough.  Severely tender to touch  -  Excisional debridement performed today.  Medically necessary to promote wound healing.  -Unable to achieve 100% clean wound bed secondary to pain    Wound care:Honey Gel;Hydrofiber;Offloading Dressing - Sacral    2. Toe ulcer, left, with necrosis of muscle ([facility])  10/29/2024:    Honey Gel;Hydrofiber;Hypafix Tape    3. Wound infection  10/29/2024: increased erythema and warmth to 4th toe with tendon exposed  -start bactrim DS, renal function checked  - wound cx collected    4. PAD (peripheral artery disease) ([facility]    10/29/2024:Has follow-up with Dr. Beltran 11/5/24.  Concerned she will not be able to make the appointment secondary to transportation issues.  Encourage patient to attend  appointment versus going to ER if her condition significantly worsens.   -patient to undergo right to left femorofemoral bypass  -CTA showed occlusion of the left iliac arterial system with reconstitution of flow below the occlusion.    5. Nicotine use disorder  10/29/2024: Tobacco cessation education provided for more than 4 minutes, discussed options of nicotine patch, medical treatment with wellbutrin and chantix. Discussed the risks of smoking including increased risk of heart disease, stroke, cancer and COPD. Discussed the benefits of quitting smoking on wound healing and circulation system.  Patient is not interested in quitting at this time.              PATIENT EDUCATION    - Importance of adequate nutrition for wound healing  -Advised to go to ER for any increased redness, swelling, drainage or odor, or if patient develops fever, chills, nausea or vomiting.      My total time spent caring for the patient on the day of the encounter was 30 minutes.   This does not include time spent on separately billable procedures/tests.       Please note that this note may have been created using voice recognition software. I have worked with technical experts from [facility] to optimize the interface.  I have made every reasonable attempt to correct obvious errors, but there may be errors of grammar and possibly     N

## 2024-10-29 NOTE — PATIENT INSTRUCTIONS
- Keep dressings clean and dry. Change dressings every 3 days, and if they become over saturated, soiled or fall off.     - If you need to change your dressings at home: Wash your wound with normal saline, wound cleanser, or unscented soap and water prior to applying your new dressings. Please avoid cleansing with hydrogen peroxide or rubbing alcohol. Hydrogen peroxide and rubbing alcohol are toxic to new tissue and skin cells.    - Should you experience any significant changes in your wound(s), such as infection (redness, swelling, localized heat, increased pain, fever > 101 F, chills) or have any questions regarding your home care instructions, please contact the wound center at [phone]. If after hours, contact your primary care physician or go to the hospital emergency room.     - If you are admitted to any hospital, you will need a new referral to come back to the wound clinic and any scheduled appointments that you already have, may be cancelled.    - If you are 5 or more minutes late for an appointment, we reserve the right to cancel and reschedule that appointment. Additionally, if you are habitually late or not showing (3 late cancellations and/or no shows), we reserve the right to cancel your remaining appointments and it will be your responsibility to obtain a new referral if services are still needed.       Albendazole Counseling:  I discussed with the patient the risks of albendazole including but not limited to cytopenia, kidney damage, nausea/vomiting and severe allergy.  The patient understands that this medication is being used in an off-label manner.

## 2024-10-30 LAB
GRAM STN SPEC: NORMAL
SIGNIFICANT IND 70042: NORMAL
SITE SITE: NORMAL
SOURCE SOURCE: NORMAL

## 2024-10-30 ASSESSMENT — ENCOUNTER SYMPTOMS
FALLS: 0
SENSORY CHANGE: 1
CHILLS: 0
COUGH: 0
CONSTIPATION: 0
NERVOUS/ANXIOUS: 0
FEVER: 0
CLAUDICATION: 1
DEPRESSION: 0
DIARRHEA: 0
SHORTNESS OF BREATH: 0
PALPITATIONS: 0
VOMITING: 0
NAUSEA: 0

## 2024-11-01 ENCOUNTER — DOCUMENTATION (OUTPATIENT)
Dept: WOUND CARE | Facility: MEDICAL CENTER | Age: 59
End: 2024-11-01
Payer: MEDICAID

## 2024-11-01 NOTE — PROGRESS NOTES
Wound culture left fourth toe ulcer negative however patient finished antibiotics the day before.  She has not noticed any worsening erythema or edema to her left fourth toe ulcer with tendon exposed or her groin wound.  She is experiencing some burning with a honeycomb dressing.  Recommend that she apply slightly moistened Hydrofiber instead.  She has not received supplies yet.  Encouraged patient to call [facility] if concerns she can reach out to our clinic.  She reports that she has had good success with the periwound barrier paste around her wounds and is very pleased with the care that she has received at the clinic..  Tolerating Bactrim DS twice daily.  Patient to continue antibiotics until completed.

## 2024-11-05 ENCOUNTER — APPOINTMENT (OUTPATIENT)
Dept: VASCULAR SURGERY | Facility: MEDICAL CENTER | Age: 59
End: 2024-11-05
Payer: MEDICAID

## 2024-11-05 VITALS
DIASTOLIC BLOOD PRESSURE: 68 MMHG | BODY MASS INDEX: 35.99 KG/M2 | WEIGHT: 216 LBS | SYSTOLIC BLOOD PRESSURE: 114 MMHG | HEART RATE: 94 BPM | TEMPERATURE: 99 F | HEIGHT: 65 IN | OXYGEN SATURATION: 95 %

## 2024-11-05 DIAGNOSIS — E66.812 CLASS 2 OBESITY WITH BODY MASS INDEX (BMI) OF 35.0 TO 35.9 IN ADULT, UNSPECIFIED OBESITY TYPE, UNSPECIFIED WHETHER SERIOUS COMORBIDITY PRESENT: ICD-10-CM

## 2024-11-05 DIAGNOSIS — I10 PRIMARY HYPERTENSION: ICD-10-CM

## 2024-11-05 DIAGNOSIS — I74.5 ILIAC ARTERY OCCLUSION, LEFT (HCC): ICD-10-CM

## 2024-11-05 DIAGNOSIS — E78.5 DYSLIPIDEMIA: ICD-10-CM

## 2024-11-05 DIAGNOSIS — Z72.0 TOBACCO USE: ICD-10-CM

## 2024-11-05 DIAGNOSIS — S31.109A NONHEALING OPEN WOUND OF GROIN: ICD-10-CM

## 2024-11-05 PROCEDURE — 99214 OFFICE O/P EST MOD 30 MIN: CPT | Performed by: SURGERY

## 2024-11-05 PROCEDURE — 3074F SYST BP LT 130 MM HG: CPT | Performed by: SURGERY

## 2024-11-05 PROCEDURE — 3078F DIAST BP <80 MM HG: CPT | Performed by: SURGERY

## 2024-11-05 ASSESSMENT — FIBROSIS 4 INDEX: FIB4 SCORE: 0.98

## 2024-11-05 NOTE — PROGRESS NOTES
"  VASCULAR SURGERY SERVICE  OFFICE FOLLOW UP      Date: 11/5/2024    Referring Provider: ILDA Neff    Consulting Physician: Julio César Beltran MD - [facility]     -------------------------------------------------------------------------------------------------    Chief complaint:  \"The wound in my left groin is not healing\".    HPI:  This is a 59 y.o. female who back in 2020 underwent left iliac artery stenting by Dr. Gimenez.  Patient was doing well until about a few months ago when she developed recurrent severe left leg claudication as well as numbness of the left first and fifth toes.  CTA was performed and showed occlusion of the left iliac arterial system with reconstitution of flow below the occlusion.  Patient was seen in vascular clinic and was scheduled to undergo angiogram with possible bypass; however, at the time of the procedure, she was found to have infected wound in the left groin and therefore the procedure was canceled.  Patient has been undergoing wound care at [facility] with no significant improvement of wound healing.   Patient is a longtime smoker, now down to half a pack a day.         Past Medical History:   Diagnosis Date    Arthritis     Back pain     Blood clotting disorder (HCC)     Dental disorder     partial bottom and top denture    Disorder of thyroid     Falls     Hypertension        Past Surgical History:   Procedure Laterality Date    ILIAC ANGIOPLASTY WITH STENT Left 5/17/2020    Procedure: ANGIOPLASTY, ARTERY, ILIAC, WITH STENT INSERTION;  Surgeon: Cuco Gimenez M.D.;  Location: SURGERY [facility];  Service: Vascular    OTHER ABDOMINAL SURGERY      gallbladder removed       Current Outpatient Medications   Medication Sig Dispense Refill    cyclobenzaprine (FLEXERIL) 5 mg tablet TAKE 1/2 TO 1 TABLET BY MOUTH THREE TIMES DAILY AS NEEDED. START BY TAKING AT NIGHT. IF NO SOMNOLENCE. MAY TRY TAKING DURING THE DAY      HYDROcodone-acetaminophen " (NORCO) 7.5-325 MG tab Take 1 Tablet by mouth 2 times a day as needed.      sulfamethoxazole-trimethoprim (BACTRIM DS) 800-160 MG tablet Take 1 Tablet by mouth 2 times a day for 10 days. 20 Tablet 0    hydrOXYzine HCl (ATARAX) 25 MG Tab Take 25 mg by mouth 3 times a day as needed for Anxiety.      cloNIDine (CATAPRES) 0.1 MG Tab Take 0.1 mg by mouth 3 times a day as needed.      hydroCHLOROthiazide 25 MG Tab Take 25 mg by mouth every morning.      DULoxetine (CYMBALTA) 60 MG Cap DR Particles delayed-release capsule Take 60 mg by mouth every morning.      lisinopril (PRINIVIL) 30 MG tablet Take 1 Tablet by mouth every day. 30 Tablet 0    levothyroxine (SYNTHROID) 112 MCG Tab Take 112 mcg by mouth every day.      pregabalin (LYRICA) 300 MG capsule Take 300 mg by mouth 2 times a day.        clopidogrel (PLAVIX) 75 MG Tab Take 1 Tab by mouth every day. 90 Tab 11     No current facility-administered medications for this visit.       Social History     Socioeconomic History    Marital status:      Spouse name: Not on file    Number of children: Not on file    Years of education: Not on file    Highest education level: Not on file   Occupational History    Not on file   Tobacco Use    Smoking status: Every Day     Current packs/day: 0.50     Average packs/day: 0.5 packs/day for 44.8 years (22.4 ttl pk-yrs)     Types: Cigarettes     Start date: 1980    Smokeless tobacco: Never   Vaping Use    Vaping status: Every Day    Substances: Nicotine   Substance and Sexual Activity    Alcohol use: Yes     Comment: occ    Drug use: No    Sexual activity: Not on file   Other Topics Concern    Not on file   Social History Narrative    Not on file     Social Drivers of Health     Financial Resource Strain: Low Risk  (5/21/2020)    Overall Financial Resource Strain (CARDIA)     Difficulty of Paying Living Expenses: Not very hard   Food Insecurity: Food Insecurity Present (9/12/2024)    Hunger Vital Sign     Worried About Running  "Out of Food in the Last Year: Sometimes true     Ran Out of Food in the Last Year: Sometimes true   Transportation Needs: Unmet Transportation Needs (9/12/2024)    PRAPARE - Transportation     Lack of Transportation (Medical): Yes     Lack of Transportation (Non-Medical): Yes   Physical Activity: Not on file   Stress: Not on file   Social Connections: Not on file   Intimate Partner Violence: Not At Risk (9/12/2024)    Humiliation, Afraid, Rape, and Kick questionnaire     Fear of Current or Ex-Partner: No     Emotionally Abused: No     Physically Abused: No     Sexually Abused: No   Housing Stability: Low Risk  (9/12/2024)    Housing Stability Vital Sign     Unable to Pay for Housing in the Last Year: No     Number of Times Moved in the Last Year: 0     Homeless in the Last Year: No       No family history on file.    Allergies:  Aspirin    Review of Systems:    Constitutional: Negative for fever, chills, weight loss,   HENT:   Negative for hearing loss or tinnitus    Eyes:    Negative for blurred vision, double vision, or loss of vision  Respiratory:  Negative for cough, hemoptysis, or wheezing    Cardiac:  Negative for chest pain or palpitations or orthopnea  Vascular:  Positive for left leg claudication   Gastrointestinal: Negative for nausea, vomiting, or abdominal pain     Negative for hematochezia or melena   Genitourinary: Negative for dysuria, frequency, or hematuria   Musculoskeletal: Discomfort in left groin  Skin:   Negative for itching or rash  Neurological:  Negative for dizziness, headaches, or tremors     Negative for speech disturbance     Negative for extremity weakness or paresthesias  Endo/Heme:  Negative for easy bruising or bleeding  Psychiatric:  Negative for depression, suicidal ideas, or hallucinations    Physical Exam:  /68 (BP Location: Left arm, Patient Position: Sitting, BP Cuff Size: Adult)   Pulse 94   Temp 37.2 °C (99 °F) (Temporal)   Ht 1.651 m (5' 5\")   Wt 98 kg (216 lb)   " SpO2 95%     Constitutional:          Pleasant overweight female in nonapparent distress   HEENT:                       Normocephalic and atraumatic, EOMI  Neck:                          Supple, no JVD, no bruits.  Cardiovascular:         Regular rate and rhythm  Pulmonary:                Good air entry bilaterally  Abdominal:                Soft, non-tender, non-distended                                      Aortic impulse not widened  Musculoskeletal:       No edema, no tenderness  Neurological:             CN II-XII grossly intact, no focal deficits  Skin:                           Skin is warm and dry. No rash noted.  Psychiatric:                Normal mood and affect.  Upper extremities:    Palpable bilateral radial pulses with multiphasic flow.  Lower extremities:    Palpable right common femoral pulses.  Left common femoral pulses are nonpalpable.  Multiphasic flow signals are obtained over the right pedal arteries.  Monophasic Doppler flow signals obtained over the left dorsalis pedis artery.  There is rubor of the left first and fifth toes.  There are ulceration seen in the left groin and lower abdominal area with mild surrounding erythema.      Labs:  Reviewed.     Radiology:  Reviewed.     Assessment:  -Nonhealing left lower abdomen and groin wound.  -Left iliac occlusion with severe left leg claudication.  -Tobacco use.  -Hypertension.  -Dyslipidemia.  -Obesity.      Plan:  I had a long discussion with patient.  Since the left lower abdominal and groin wounds have not made any progress toward healing despite wound care, I discussed with patient the option of undergoing debridement and washout of the wounds followed by wound VAC placement to hopefully get the wounds to heal so that patient can undergo revascularization procedure for her left lower extremity.  Risks of procedure were discussed which include, but not limited to, bleeding, recurrent/persistent infection, and perioperative anesthetic  complication.  Patient indicated understanding and would like to proceed.  All questions were answered.    I counseled patient to stop smoking.      Julio César Beltran MD  [facility]   [employer] preferred or call my office [phone]  __________________________________________________________________  Patient:Reba Marroquinngelo   MRN:[id number]   CSN:[id number]

## 2024-11-06 ENCOUNTER — TELEPHONE (OUTPATIENT)
Dept: VASCULAR SURGERY | Facility: MEDICAL CENTER | Age: 59
End: 2024-11-06
Payer: MEDICAID

## 2024-11-06 ENCOUNTER — APPOINTMENT (OUTPATIENT)
Dept: ADMISSIONS | Facility: MEDICAL CENTER | Age: 59
End: 2024-11-06
Attending: SURGERY
Payer: MEDICAID

## 2024-11-06 DIAGNOSIS — S31.104A NON-HEALING OPEN WOUND OF LEFT GROIN, INITIAL ENCOUNTER: ICD-10-CM

## 2024-11-06 DIAGNOSIS — I74.5 OCCLUSION OF LEFT ILIAC ARTERY (HCC): ICD-10-CM

## 2024-11-06 DIAGNOSIS — I74.5 ILIAC ARTERY OCCLUSION, LEFT (HCC): ICD-10-CM

## 2024-11-06 DIAGNOSIS — S31.109A NONHEALING OPEN WOUND OF GROIN: ICD-10-CM

## 2024-11-06 NOTE — TELEPHONE ENCOUNTER
Patient scheduled for procedure with Dr. Beltran for 11/08 @ 11:30 am. I left a message to notify patient about the Plavix instructions. Patient is ok to continue to take Plavix per Dr. Beltran.

## 2024-11-06 NOTE — TELEPHONE ENCOUNTER
I called patient and scheduled procedure with Dr. Beltran for 11/08 @ 11:30 am. Patient is to check in @ 9:30 am in the TT 1st floor.     Patient instructed nothing to eat or drink 8 hours prior and will need a .    Pending instructions on Plavix. Message sent to Dr. Beltran for instructions.

## 2024-11-07 ENCOUNTER — PRE-ADMISSION TESTING (OUTPATIENT)
Dept: ADMISSIONS | Facility: MEDICAL CENTER | Age: 59
End: 2024-11-07
Attending: SURGERY
Payer: MEDICAID

## 2024-11-08 ENCOUNTER — ANESTHESIA EVENT (OUTPATIENT)
Dept: SURGERY | Facility: MEDICAL CENTER | Age: 59
End: 2024-11-08
Payer: MEDICAID

## 2024-11-08 ENCOUNTER — PHARMACY VISIT (OUTPATIENT)
Dept: PHARMACY | Facility: MEDICAL CENTER | Age: 59
End: 2024-11-08
Payer: COMMERCIAL

## 2024-11-08 ENCOUNTER — ANESTHESIA (OUTPATIENT)
Dept: SURGERY | Facility: MEDICAL CENTER | Age: 59
End: 2024-11-08
Payer: MEDICAID

## 2024-11-08 ENCOUNTER — HOSPITAL ENCOUNTER (OUTPATIENT)
Facility: MEDICAL CENTER | Age: 59
End: 2024-11-08
Attending: SURGERY | Admitting: SURGERY
Payer: MEDICAID

## 2024-11-08 VITALS
TEMPERATURE: 97.7 F | HEART RATE: 75 BPM | WEIGHT: 210.1 LBS | DIASTOLIC BLOOD PRESSURE: 65 MMHG | OXYGEN SATURATION: 95 % | SYSTOLIC BLOOD PRESSURE: 138 MMHG | RESPIRATION RATE: 18 BRPM | BODY MASS INDEX: 35 KG/M2 | HEIGHT: 65 IN

## 2024-11-08 DIAGNOSIS — Z98.890 S/P EXCISIONAL DEBRIDEMENT: ICD-10-CM

## 2024-11-08 PROBLEM — F11.20 NARCOTIC DEPENDENCE (HCC): Status: ACTIVE | Noted: 2024-11-08

## 2024-11-08 PROBLEM — J44.9 COPD (CHRONIC OBSTRUCTIVE PULMONARY DISEASE) (HCC): Status: ACTIVE | Noted: 2024-11-08

## 2024-11-08 LAB
GRAM STN SPEC: NORMAL
GRAM STN SPEC: NORMAL
SIGNIFICANT IND 70042: NORMAL
SIGNIFICANT IND 70042: NORMAL
SITE SITE: NORMAL
SITE SITE: NORMAL
SOURCE SOURCE: NORMAL
SOURCE SOURCE: NORMAL

## 2024-11-08 PROCEDURE — 700111 HCHG RX REV CODE 636 W/ 250 OVERRIDE (IP): Mod: UD | Performed by: ANESTHESIOLOGY

## 2024-11-08 PROCEDURE — 87070 CULTURE OTHR SPECIMN AEROBIC: CPT | Mod: 91

## 2024-11-08 PROCEDURE — 160048 HCHG OR STATISTICAL LEVEL 1-5: Performed by: SURGERY

## 2024-11-08 PROCEDURE — 160002 HCHG RECOVERY MINUTES (STAT): Performed by: SURGERY

## 2024-11-08 PROCEDURE — 87106 FUNGI IDENTIFICATION YEAST: CPT | Mod: 91

## 2024-11-08 PROCEDURE — 11045 DBRDMT SUBQ TISS EACH ADDL: CPT | Performed by: SURGERY

## 2024-11-08 PROCEDURE — RXMED WILLOW AMBULATORY MEDICATION CHARGE: Performed by: SURGERY

## 2024-11-08 PROCEDURE — 160038 HCHG SURGERY MINUTES - EA ADDL 1 MIN LEVEL 2: Performed by: SURGERY

## 2024-11-08 PROCEDURE — 700105 HCHG RX REV CODE 258: Mod: UD | Performed by: ANESTHESIOLOGY

## 2024-11-08 PROCEDURE — 160035 HCHG PACU - 1ST 60 MINS PHASE I: Performed by: SURGERY

## 2024-11-08 PROCEDURE — 160025 RECOVERY II MINUTES (STATS): Performed by: SURGERY

## 2024-11-08 PROCEDURE — 87075 CULTR BACTERIA EXCEPT BLOOD: CPT

## 2024-11-08 PROCEDURE — 700101 HCHG RX REV CODE 250: Mod: UD | Performed by: ANESTHESIOLOGY

## 2024-11-08 PROCEDURE — 97605 NEG PRS WND THER DME<=50SQCM: CPT | Performed by: SURGERY

## 2024-11-08 PROCEDURE — 87186 SC STD MICRODIL/AGAR DIL: CPT

## 2024-11-08 PROCEDURE — 87077 CULTURE AEROBIC IDENTIFY: CPT

## 2024-11-08 PROCEDURE — 11042 DBRDMT SUBQ TIS 1ST 20SQCM/<: CPT | Performed by: SURGERY

## 2024-11-08 PROCEDURE — 700101 HCHG RX REV CODE 250: Mod: UD | Performed by: SURGERY

## 2024-11-08 PROCEDURE — 160009 HCHG ANES TIME/MIN: Performed by: SURGERY

## 2024-11-08 PROCEDURE — 160046 HCHG PACU - 1ST 60 MINS PHASE II: Performed by: SURGERY

## 2024-11-08 PROCEDURE — 87205 SMEAR GRAM STAIN: CPT

## 2024-11-08 PROCEDURE — 160027 HCHG SURGERY MINUTES - 1ST 30 MINS LEVEL 2: Performed by: SURGERY

## 2024-11-08 RX ORDER — LABETALOL HYDROCHLORIDE 5 MG/ML
5 INJECTION, SOLUTION INTRAVENOUS
Status: DISCONTINUED | OUTPATIENT
Start: 2024-11-08 | End: 2024-11-08 | Stop reason: HOSPADM

## 2024-11-08 RX ORDER — HYDROMORPHONE HYDROCHLORIDE 1 MG/ML
0.1 INJECTION, SOLUTION INTRAMUSCULAR; INTRAVENOUS; SUBCUTANEOUS
Status: DISCONTINUED | OUTPATIENT
Start: 2024-11-08 | End: 2024-11-08 | Stop reason: HOSPADM

## 2024-11-08 RX ORDER — SUCCINYLCHOLINE CHLORIDE 20 MG/ML
INJECTION INTRAMUSCULAR; INTRAVENOUS PRN
Status: DISCONTINUED | OUTPATIENT
Start: 2024-11-08 | End: 2024-11-08 | Stop reason: SURG

## 2024-11-08 RX ORDER — ROCURONIUM BROMIDE 10 MG/ML
INJECTION, SOLUTION INTRAVENOUS PRN
Status: DISCONTINUED | OUTPATIENT
Start: 2024-11-08 | End: 2024-11-08 | Stop reason: SURG

## 2024-11-08 RX ORDER — BUPIVACAINE HYDROCHLORIDE AND EPINEPHRINE 5; 5 MG/ML; UG/ML
INJECTION, SOLUTION EPIDURAL; INTRACAUDAL; PERINEURAL
Status: DISCONTINUED | OUTPATIENT
Start: 2024-11-08 | End: 2024-11-08 | Stop reason: HOSPADM

## 2024-11-08 RX ORDER — OXYCODONE HCL 5 MG/5 ML
5 SOLUTION, ORAL ORAL
Status: DISCONTINUED | OUTPATIENT
Start: 2024-11-08 | End: 2024-11-08 | Stop reason: HOSPADM

## 2024-11-08 RX ORDER — OXYCODONE HCL 5 MG/5 ML
10 SOLUTION, ORAL ORAL
Status: DISCONTINUED | OUTPATIENT
Start: 2024-11-08 | End: 2024-11-08 | Stop reason: HOSPADM

## 2024-11-08 RX ORDER — DIPHENHYDRAMINE HYDROCHLORIDE 50 MG/ML
12.5 INJECTION INTRAMUSCULAR; INTRAVENOUS
Status: DISCONTINUED | OUTPATIENT
Start: 2024-11-08 | End: 2024-11-08 | Stop reason: HOSPADM

## 2024-11-08 RX ORDER — ONDANSETRON 2 MG/ML
4 INJECTION INTRAMUSCULAR; INTRAVENOUS
Status: DISCONTINUED | OUTPATIENT
Start: 2024-11-08 | End: 2024-11-08 | Stop reason: HOSPADM

## 2024-11-08 RX ORDER — HYDROCODONE BITARTRATE AND ACETAMINOPHEN 5; 325 MG/1; MG/1
1 TABLET ORAL EVERY 6 HOURS PRN
Qty: 4 TABLET | Refills: 0 | Status: SHIPPED | OUTPATIENT
Start: 2024-11-08 | End: 2024-11-09

## 2024-11-08 RX ORDER — HYDROMORPHONE HYDROCHLORIDE 1 MG/ML
0.2 INJECTION, SOLUTION INTRAMUSCULAR; INTRAVENOUS; SUBCUTANEOUS
Status: DISCONTINUED | OUTPATIENT
Start: 2024-11-08 | End: 2024-11-08 | Stop reason: HOSPADM

## 2024-11-08 RX ORDER — EPHEDRINE SULFATE 50 MG/ML
5 INJECTION, SOLUTION INTRAVENOUS
Status: DISCONTINUED | OUTPATIENT
Start: 2024-11-08 | End: 2024-11-08 | Stop reason: HOSPADM

## 2024-11-08 RX ORDER — IPRATROPIUM BROMIDE AND ALBUTEROL SULFATE 2.5; .5 MG/3ML; MG/3ML
3 SOLUTION RESPIRATORY (INHALATION)
Status: DISCONTINUED | OUTPATIENT
Start: 2024-11-08 | End: 2024-11-08 | Stop reason: HOSPADM

## 2024-11-08 RX ORDER — SODIUM CHLORIDE 9 MG/ML
INJECTION, SOLUTION INTRAVENOUS
Status: DISCONTINUED | OUTPATIENT
Start: 2024-11-08 | End: 2024-11-08 | Stop reason: SURG

## 2024-11-08 RX ORDER — MEPERIDINE HYDROCHLORIDE 25 MG/ML
12.5 INJECTION INTRAMUSCULAR; INTRAVENOUS; SUBCUTANEOUS
Status: DISCONTINUED | OUTPATIENT
Start: 2024-11-08 | End: 2024-11-08 | Stop reason: HOSPADM

## 2024-11-08 RX ORDER — HYDROMORPHONE HYDROCHLORIDE 1 MG/ML
0.4 INJECTION, SOLUTION INTRAMUSCULAR; INTRAVENOUS; SUBCUTANEOUS
Status: DISCONTINUED | OUTPATIENT
Start: 2024-11-08 | End: 2024-11-08 | Stop reason: HOSPADM

## 2024-11-08 RX ORDER — HYDRALAZINE HYDROCHLORIDE 20 MG/ML
5 INJECTION INTRAMUSCULAR; INTRAVENOUS
Status: DISCONTINUED | OUTPATIENT
Start: 2024-11-08 | End: 2024-11-08 | Stop reason: HOSPADM

## 2024-11-08 RX ORDER — CEFAZOLIN SODIUM 1 G/3ML
INJECTION, POWDER, FOR SOLUTION INTRAMUSCULAR; INTRAVENOUS PRN
Status: DISCONTINUED | OUTPATIENT
Start: 2024-11-08 | End: 2024-11-08 | Stop reason: SURG

## 2024-11-08 RX ORDER — BUPIVACAINE HYDROCHLORIDE AND EPINEPHRINE 5; 5 MG/ML; UG/ML
INJECTION, SOLUTION EPIDURAL; INTRACAUDAL; PERINEURAL
Status: DISCONTINUED
Start: 2024-11-08 | End: 2024-11-08 | Stop reason: HOSPADM

## 2024-11-08 RX ORDER — LIDOCAINE HYDROCHLORIDE 20 MG/ML
INJECTION, SOLUTION EPIDURAL; INFILTRATION; INTRACAUDAL; PERINEURAL PRN
Status: DISCONTINUED | OUTPATIENT
Start: 2024-11-08 | End: 2024-11-08 | Stop reason: SURG

## 2024-11-08 RX ORDER — HALOPERIDOL 5 MG/ML
1 INJECTION INTRAMUSCULAR
Status: DISCONTINUED | OUTPATIENT
Start: 2024-11-08 | End: 2024-11-08 | Stop reason: HOSPADM

## 2024-11-08 RX ORDER — MIDAZOLAM HYDROCHLORIDE 1 MG/ML
1 INJECTION INTRAMUSCULAR; INTRAVENOUS
Status: DISCONTINUED | OUTPATIENT
Start: 2024-11-08 | End: 2024-11-08 | Stop reason: HOSPADM

## 2024-11-08 RX ADMIN — SUCCINYLCHOLINE CHLORIDE 140 MG: 20 INJECTION, SOLUTION INTRAMUSCULAR; INTRAVENOUS at 12:17

## 2024-11-08 RX ADMIN — SUGAMMADEX 200 MG: 100 INJECTION, SOLUTION INTRAVENOUS at 12:37

## 2024-11-08 RX ADMIN — LIDOCAINE HYDROCHLORIDE 100 MG: 20 INJECTION, SOLUTION EPIDURAL; INFILTRATION; INTRACAUDAL; PERINEURAL at 12:17

## 2024-11-08 RX ADMIN — FENTANYL CITRATE 100 MCG: 50 INJECTION, SOLUTION INTRAMUSCULAR; INTRAVENOUS at 12:17

## 2024-11-08 RX ADMIN — PROPOFOL 200 MG: 10 INJECTION, EMULSION INTRAVENOUS at 12:17

## 2024-11-08 RX ADMIN — CEFAZOLIN 2 G: 1 INJECTION, POWDER, FOR SOLUTION INTRAMUSCULAR; INTRAVENOUS at 12:24

## 2024-11-08 RX ADMIN — ROCURONIUM BROMIDE 10 MG: 50 INJECTION, SOLUTION INTRAVENOUS at 12:17

## 2024-11-08 RX ADMIN — SODIUM CHLORIDE: 9 INJECTION, SOLUTION INTRAVENOUS at 12:09

## 2024-11-08 ASSESSMENT — PAIN DESCRIPTION - PAIN TYPE
TYPE: SURGICAL PAIN

## 2024-11-08 ASSESSMENT — FIBROSIS 4 INDEX: FIB4 SCORE: 0.98

## 2024-11-08 ASSESSMENT — PAIN SCALES - GENERAL: PAIN_LEVEL: 0

## 2024-11-08 NOTE — DISCHARGE INSTRUCTIONS
HOME CARE INSTRUCTIONS    ACTIVITY: Rest and take it easy for the first 24 hours.  A responsible adult is recommended to remain with you during that time.  It is normal to feel sleepy.  We encourage you to not do anything that requires balance, judgment or coordination.    FOR 24 HOURS DO NOT:  Drive, operate machinery or run household appliances.  Drink beer or alcoholic beverages.  Make important decisions or sign legal documents.    SPECIAL INSTRUCTIONS:     1) Activities:  As tolerated.   2) Go to [facility] on Monday for wound vac change - PLEASE BRING SUPPLIES  3) Resume home medications.   4) Follow up with Dr. Beltran or Shell in Vascular clinic in 2 to 3 weeks.     DIET: No restrictions, may resume normal diet as tolerated. To avoid nausea, slowly advance diet as tolerated, avoiding spicy or greasy foods for the first day.  Add more substantial food to your diet according to your physician's instructions.  INCREASE FLUIDS AND FIBER TO AVOID CONSTIPATION.    SURGICAL DRESSING/BATHING: Sponge bath while wound vac is in place until cleared by your provider to shower.     MEDICATIONS: Resume taking daily medication.  Take prescribed pain medication with food.  If no medication is prescribed, you may take non-aspirin pain medication if needed.  PAIN MEDICATION CAN BE VERY CONSTIPATING.  Take a stool softener or laxative such as senokot, pericolace, or milk of magnesia if needed.    Prescription given for Norco.  Last pain medication given at none.    A follow-up appointment should be arranged with your doctor; call to schedule.    You should CALL YOUR PHYSICIAN if you develop:  Fever greater than 101 degrees F.  Pain not relieved by medication, or persistent nausea or vomiting.  Excessive bleeding (blood soaking through dressing) or unexpected drainage from the wound.  Extreme redness or swelling around the incision site, drainage of pus or foul smelling drainage.  Inability to urinate or empty your  bladder within 8 hours.  Problems with breathing or chest pain.    You should call 911 if you develop problems with breathing or chest pain.  If you are unable to contact your doctor or surgical center, you should go to the nearest emergency room or urgent care center.       MILD FLU-LIKE SYMPTOMS ARE NORMAL.  YOU MAY EXPERIENCE GENERALIZED MUSCLE ACHES, THROAT IRRITATION, HEADACHE AND/OR SOME NAUSEA.    If any questions arise, call Dr Beltran at [phone].  If your doctor is not available, please feel free to call the Surgical Center at [phone].  The Center is open Monday through Friday from 7AM to 7PM.      A registered nurse may call you a few days after your surgery to see how you are doing after your procedure.    You may also receive a survey in the mail within the next two weeks and we ask that you take a few moments to complete the survey and return it to us.  Our goal is to provide you with very good care and we value your comments.     Depression / Suicide Risk    As you are discharged from this [facility] facility, it is important to learn how to keep safe from harming yourself.    Recognize the warning signs:  Abrupt changes in personality, positive or negative- including increase in energy   Giving away possessions  Change in eating patterns- significant weight changes-  positive or negative  Change in sleeping patterns- unable to sleep or sleeping all the time   Unwillingness or inability to communicate  Depression  Unusual sadness, discouragement and loneliness  Talk of wanting to die  Neglect of personal appearance   Rebelliousness- reckless behavior  Withdrawal from people/activities they love  Confusion- inability to concentrate     If you or a loved one observes any of these behaviors or has concerns about self-harm, here's what you can do:  Talk about it- your feelings and reasons for harming yourself  Remove any means that you might use to hurt yourself (examples: pills, rope,  extension cords, firearm)  Get professional help from the community (Mental Health, Substance Abuse, psychological counseling)  Do not be alone:Call your Safe Contact- someone whom you trust who will be there for you.  Call your local CRISIS HOTLINE 223-8323 or 894-590-2749  Call your local Children's Mobile Crisis Response Team [place] (376) 520-6166 or www.Pocket  Call the toll free National Suicide Prevention Hotlines   National Suicide Prevention Lifeline 385-161-MRIZ (8021)  [facility] Line Network 800-SUICIDE (613-8935)    I acknowledge receipt and understanding of these Home Care instructions.

## 2024-11-08 NOTE — ANESTHESIA PROCEDURE NOTES
Airway    Date/Time: 11/8/2024 12:19 PM    Performed by: Kamlesh Bourgeois M.D.  Authorized by: Kamlesh Bourgeois M.D.    Location:  OR  Urgency:  Elective  Difficult Airway: No    Indications for Airway Management:  Anesthesia      Spontaneous Ventilation: absent    Sedation Level:  Deep  Preoxygenated: Yes    Patient Position:  Sniffing  Mask Difficulty Assessment:  1 - vent by mask  Final Airway Type:  Endotracheal airway  Final Endotracheal Airway:  ETT  Cuffed: Yes    Technique Used for Successful ETT Placement:  Direct laryngoscopy    Insertion Site:  Oral  Blade Type:  Sharif  Laryngoscope Blade/Videolaryngoscope Blade Size:  2  ETT Size (mm):  7.5  Measured from:  Gums  ETT to Gums (cm):  21  Placement Verified by: auscultation and capnometry    Cormack-Lehane Classification:  Grade I - full view of glottis  Number of Attempts at Approach:  1

## 2024-11-08 NOTE — OP REPORT
DATE OF SERVICE:  11/08/2024     SURGEON:  Julio César Beltran MD     ANESTHESIOLOGIST:  Kamlesh Bourgeois MD     TYPE OF ANESTHESIA:  General anesthesia and local anesthesia with 30 mL of   0.5% Marcaine with epinephrine solution.     PREOPERATIVE DIAGNOSES:  Chronic nonhealing left lower abdominal wound and the   groin wounds.     POSTOPERATIVE DIAGNOSES:  Chronic nonhealing left lower abdominal wound and   Left groin wounds.     PROCEDURES:  1.  Excisional debridement (skin and subcutaneous tissues). Left lower   abdominal wound (6 x 3 cm) and left groin wound (3 x 1 cm).  2.  Application of wound VAC, less than 50 cm2.     INDICATIONS FOR PROCEDURE:  This is a pleasant 59-year-old female with   multiple medical problems who has had chronic wounds in the left lower abdomen   as well as left groin.  These wounds have not healed despite wound care.    Discussion was made with the patient.  She would like to undergo above   procedure, fully understanding all risks.     DESCRIPTION OF PROCEDURE:  Informed consent was obtained, the patient was   taken to the operating room and was placed in the supine position.  Sequential   compression devices were applied.  The patient was given Ancef intravenously.    General anesthesia was induced.     Next, her left lower abdomen and groin were sterilely prepped and draped in the   normal fashion.  A timeout procedure was done.  The skin and subcutaneous   tissues over and surrounding the wounds were anesthetized with Marcaine   solution. An elliptical incision was made around these wounds from the skin   extended down through the subcutaneous tissues.  The wounds were completely   excised.  The dimensions of the wounds were approximately 6 x 3 cm in the lower   abdomen and 3 x 1 cm in the left groin.  The wounds were also swabbed and sent   for culture and sensitivity.  The wounds were copiously irrigated with sterile   saline solution, was found to have excellent hemostasis.     The  wounds were cleaned and wound VAC sponges were cut to appropriate sizes and   adhesive dressings were applied over the sponges and connected to a wound VAC.     ESTIMATED BLOOD LOSS:  2 mL.     COUNTS:  Sponge and instrument count was correct x2.     Patient was awakened, extubated, taken to recovery area in stable condition.        ______________________________  MD DEWAYNE Yates/ARSH    DD:  11/08/2024 12:58  DT:  11/08/2024 15:15    Job#:  [id number]

## 2024-11-08 NOTE — ANESTHESIA POSTPROCEDURE EVALUATION
Patient: Reba Nicole    Procedure Summary       Date: 11/08/24 Room / Location: [facility] ROOM 28 / SURGERY SAME DAY [facility]    Anesthesia Start: 1209 Anesthesia Stop: 1246    Procedures:       LEFT GROIN WOUND DEBRIDEMENT, WASHOUT, AND WOUND VAC PLACEMENT (Left: Groin)      APPLICATION OR REPLACEMENT, WOUND VAC (Left: Groin) Diagnosis: (NONHEALING OPEN WOUND GROIN)    Surgeons: Julio César Beltran M.D. Responsible Provider: Kamlesh Bourgeois M.D.    Anesthesia Type: general ASA Status: 3            Final Anesthesia Type: general  Last vitals  BP   Blood Pressure: (!) 143/83    Temp   36.4 °C (97.5 °F)    Pulse   77   Resp   18    SpO2   97 %      Anesthesia Post Evaluation    Patient location during evaluation: PACU  Patient participation: complete - patient participated  Level of consciousness: awake and alert  Pain score: 0    Airway patency: patent  Anesthetic complications: no  Cardiovascular status: hemodynamically stable  Respiratory status: acceptable  Hydration status: euvolemic    PONV: none          No notable events documented.     Nurse Pain Score: 7 (NPRS)

## 2024-11-08 NOTE — OR SURGEON
Immediate Post OP Note    PreOp Diagnosis: Nonhealing wounds left lower abdomen and groin.      PostOp Diagnosis: Same      Procedure(s):  1) Left lower abdomen and groin wound excisional debridement and washout - Wound Class: Dirty or Infected  2) Application of wound VAC - Wound Class: Dirty or Infected    Surgeon(s):  Julio César Beltran M.D.    Anesthesiologist/Type of Anesthesia:  Anesthesiologist: Kamlesh Bourgeois M.D./General    Surgical Staff:  Circulator: Oliva Gallego R.N.  Relief Circulator: Shell March R.N.  Scrub Person: Nadia Alcaraz    Specimens removed if any:  ID Type Source Tests Collected by Time Destination   1 : left groin Other Other AEROBIC/ANAEROBIC CULTURE (SURGERY) Julio César Beltran M.D. 11/8/2024 12:28 PM    2 : left groin Other Other AEROBIC/ANAEROBIC CULTURE (SURGERY) Julio César Beltran M.D. 11/8/2024 12:29 PM        Estimated Blood Loss: 2 mL.    Findings: Healthy tissues postdebridement.    Complications: None.    Dictated, #[id number].        11/8/2024 12:53 PM Julio César Beltran M.D.

## 2024-11-08 NOTE — OR NURSING
1244 Pt to PACU from OR. Report from anesthesiologist and OR RN. 6L mask O2, arouses to voice on arrival to PACU. Respirations even and unlabored. VSS. Wound vac in place, dressing CDI.     1315 To bathroom by bridget mack. Declines pain/nausea.    1345: pt dressed with minimal assistance. Up to recliner chair.    1355: able to speak with sister Kimberley. She is on the way. Directions given.    1425 Discharge orders received. Meets discharge criteria at this time. Tolerable pain. No nausea. Tolerating PO. On RA. Monitors discontinued. IV removed with tip intact. Reviewed discharge paperwork with pt and sister. Discussed diet, activity, medications, wound vac equipment, follow up care and worsening symptoms. No questions at this time. Pt to be discharged home via private vehicle. Pt escorted out of department in wheelchair with RN, sister, vac supplies and all belongings.

## 2024-11-08 NOTE — OR NURSING
Pre admit appointment completed with Reba Nicole for surgery/procedure on 11/8/24.    Medication and fasting instructions given per RN pre procedure protocol. Encouraged patient to increase oral intake the day prior to procedure including intake of electrolyte drinks such as Gatorade or electrolyte water. Patient instructed to stop taking all vitamins and herbal supplements 7 days prior to surgery. Patient instructed to stop any NSAIDS 5 days prior to surgery, unless otherwise instructed by medical provider.     Patient verbalizes understanding of all instructions given. No further questions at this time. Medication instruction sheet emailed .

## 2024-11-08 NOTE — ANESTHESIA PREPROCEDURE EVALUATION
Case: [id number] Date/Time: 11/08/24 1115    Procedures:       LEFT GROIN WOUND DEBRIDEMENT, WASHOUT, AND WOUND VAC PLACEMENT      APPLICATION OR REPLACEMENT, WOUND VAC    Pre-op diagnosis: NONHEALING OPEN WOUND GROIN    Location: [facility] ROOM 28 / SURGERY SAME DAY [facility]    Surgeons: Julio César Beltran M.D.            Relevant Problems   PULMONARY   (positive) COPD (chronic obstructive pulmonary disease) (HCC)      CARDIAC   (positive) Essential hypertension   (positive) Occlusion of left iliac artery (HCC)      ENDO   (positive) Hypothyroid      Other   (positive) Narcotic dependence (HCC)   (positive) Tobacco dependence   (positive) Tobacco use disorder       Physical Exam    Airway   Mallampati: II  TM distance: >3 FB  Neck ROM: full       Cardiovascular - normal exam  Rhythm: regular  Rate: normal  (-) murmur     Dental - normal exam           Pulmonary - normal exam  (+) rhonchi     Abdominal    Neurological - normal exam                   Anesthesia Plan    ASA 3   ASA physical status 3 criteria: COPD - poorly controlled and alcohol and/or substance dependence or abuse    Plan - general       Airway plan will be ETT          Induction: intravenous    Postoperative Plan: Postoperative administration of opioids is intended.    Pertinent diagnostic labs and testing reviewed    Informed Consent:    Anesthetic plan and risks discussed with patient.    Use of blood products discussed with: patient whom consented to blood products.

## 2024-11-08 NOTE — ANESTHESIA TIME REPORT
Anesthesia Start and Stop Event Times       Date Time Event    11/8/2024 1156 Ready for Procedure     1209 Anesthesia Start     1246 Anesthesia Stop          Responsible Staff  11/08/24      Name Role Begin End    Kamlesh Bourgeois M.D. Anesth 1209 1246          Overtime Reason:  no overtime (within assigned shift)    Comments:

## 2024-11-10 LAB
BACTERIA WND AEROBE CULT: ABNORMAL
GRAM STN SPEC: ABNORMAL
GRAM STN SPEC: ABNORMAL
SIGNIFICANT IND 70042: ABNORMAL
SIGNIFICANT IND 70042: ABNORMAL
SITE SITE: ABNORMAL
SITE SITE: ABNORMAL
SOURCE SOURCE: ABNORMAL
SOURCE SOURCE: ABNORMAL

## 2024-11-11 ENCOUNTER — NON-PROVIDER VISIT (OUTPATIENT)
Dept: WOUND CARE | Facility: MEDICAL CENTER | Age: 59
End: 2024-11-11
Attending: EMERGENCY MEDICINE
Payer: MEDICAID

## 2024-11-11 LAB
BACTERIA SPEC ANAEROBE CULT: NORMAL
BACTERIA SPEC ANAEROBE CULT: NORMAL
SIGNIFICANT IND 70042: NORMAL
SIGNIFICANT IND 70042: NORMAL
SITE SITE: NORMAL
SITE SITE: NORMAL
SOURCE SOURCE: NORMAL
SOURCE SOURCE: NORMAL

## 2024-11-11 PROCEDURE — 97605 NEG PRS WND THER DME<=50SQCM: CPT

## 2024-11-11 PROCEDURE — 99211 OFF/OP EST MAY X REQ PHY/QHP: CPT

## 2024-11-11 NOTE — PATIENT INSTRUCTIONS
- Keep dressings clean and dry. Change dressings every 3-4 days, and if they become over saturated, soiled or fall off.     - Wound vac may not have any drainage in tube or cannister & it will still be working.   Change cannister if it does become full by pressing tab on side of machine to remove canister and snap on new one. Full canister can be thrown in the trash. If cannister fills with bright red blood - go to ER. Dressing will be changed every MWF at the wound clini.  If you are having issues with your wound VAC, please consider patching leaks, changing the canister, or calling [phone] for troubleshooting. If the wound VAC has been off or un-operational for over 2 hours, call wound care center to inform them and remove all dressings including black foam and replace with normal saline damp gauze.     - Should you experience any significant changes in your wound(s), such as infection (redness, swelling, localized heat, increased pain, fever > 101 F, chills) or have any questions regarding your home care instructions, please contact the wound center at [phone]. If after hours, contact your primary care physician or go to the hospital emergency room.     - If you are admitted to any hospital, you will need a new referral to come back to the wound clinic and any scheduled appointments that you already have, may be cancelled.    - If you are 5 or more minutes late for an appointment, we reserve the right to cancel and reschedule that appointment. Additionally, if you are habitually late or not showing (3 late cancellations and/or no shows), we reserve the right to cancel your remaining appointments and it will be your responsibility to obtain a new referral if services are still needed.

## 2024-11-11 NOTE — CERTIFICATION
Non Provider Encounter - Full Thickness Wound      HISTORY OF PRESENT ILLNESS    Wound History:   START OF CARE IN CLINIC: 11/11/24    REFERRING PROVIDER: Henok Alcantara M.D.    WOUND: Full Thickness Wound   LOCATION: Left lower abdomen; left groin; left 4th plantar toe (did not assess today 11/11/24)     Pertinent Medical History:   - HOW / WHEN injury first occurred: From Cesario Arteaga's note from 10/29/24: Patient developed recurrent rest pain to left leg as well as numbness to her left first and fifth toes around June 2024. 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 She followed up with Dr. Beltran on 8/29/2024 and plan was to undergo angiogram with intervention. Procedure was scheduled for 9/11/2024, unfortunately patient developed an ulcer to her left groin and left lower abdomen. She followed back up with Dr. Beltran on 9/24/2024 to assess left groin ulcers since she had been on antibiotics. She was applying mupirocin ointment which was stinging. Surgery was delayed until ulcer completely healed to avoid graft infection. She was instructed to apply Betadine and cover with dry gauze daily. She went to the ED on 10/17/2024 secondary to pain and worsening of ulceration. She returned to ED on 10/24/2024 as of wound and pain continued to worsen. Additionally patient also developed an ulcer to her left fourth toe after patient felt a lump that was causing her pain in between her third and fourth toes. She self drained with a clean needle on 10/28/2024. Patient did not notice any purulence only scant bleeding. Today she presents with an ulcer with exposed FDL of the fourth toe.    11/11/24: Patient returns on today as a new evaluation after I&D with Dr. Beltran on 11/8/24 of the left lower abdomen and left groin wounds with wound vac placement. She was re referred back to wound clinic for care.     - Current/home treatment: Wound vac in place for abdomen  and groin. Unsure what patient has been treating toe wound with since last visit to wound clinic.     Labs:   A1c:   Lab Results   Component Value Date/Time    HBA1C 5.6 05/17/2020 12:57 AM        TOBACCO USE:   Tobacco Use: High Risk (11/8/2024)    Patient History     Smoking Tobacco Use: Every Day     Smokeless Tobacco Use: Never     Passive Exposure: Not on file     Wound Cx:   Lab Results   Component Value Date/Time    CULTRSULT Light growth usual skin karen. (A) 11/08/2024 12:29 PM    CULTRSULT Candida albicans  Moderate growth   (A) 11/08/2024 12:29 PM    CULTRSULT Culture in progress. 11/08/2024 12:29 PM     Patient's problem list, allergies, and current medications reviewed and updated in Epic. Please see medical record for details.     WOUND ASSESSMENT  Wound 11/11/24 Full Thickness Wound Left lower abdomen (Active)   Wound Image   11/11/24 0730   Site Assessment Red;Pink;Yellow;Painful 11/11/24 0730   Periwound Assessment Scar tissue;Blanchable erythema 11/11/24 0730   Margins Unattached edges 11/11/24 0730   Closure Secondary intention 11/11/24 0730   Drainage Amount Moderate 11/11/24 0730   Drainage Description Serosanguineous 11/11/24 0730   Treatments Topical Lidocaine;Cleansed;Nonselective debridement;Site care 11/11/24 0730   Wound Cleansing Hypochlorus Acid 11/11/24 0730   Periwound Protectant No-sting Skin Prep;Drape 11/11/24 0730   Dressing Status Old drainage 11/11/24 0730   Dressing Changed New 11/11/24 0730   Dressing Cleansing/Solutions Not Applicable 11/11/24 0730   Dressing Options Wound Vac 11/11/24 0730   Dressing Change/Treatment Frequency Monday, Wednesday, Friday, and As Needed 11/11/24 0730   Wound Team Following 3x Weekly 11/11/24 0730   Non-staged Wound Description Full thickness 11/11/24 0730   Wound Length (cm) 2.5 cm 11/11/24 0730   Wound Width (cm) 8 cm 11/11/24 0730   Wound Depth (cm) 3 cm 11/11/24 0730   Wound Surface Area (cm^2) 20 cm^2 11/11/24 0730   Wound Volume (cm^3) 60  cm^3 11/11/24 0730   Post-Procedure Length (cm) 1.1 cm 10/29/24 1500   Post-Procedure Width (cm) 7.3 cm 10/29/24 1500   Post-Procedure Depth (cm) 0.4 cm 10/29/24 1500   Post-Procedure Surface Area (cm^2) 8.03 cm^2 10/29/24 1500   Post-Procedure Volume (cm^3) 3.212 cm^3 10/29/24 1500   Tunneling (cm) 0 cm 11/11/24 0730   Undermining (cm) 0 cm 11/11/24 0730   Wound Odor None 11/11/24 0730   Pulses Left;DP;PT;Not palpable;Doppler 10/29/24 1500   Exposed Structures Adipose 11/11/24 0730   Number of days: 0       Wound 11/11/24 Full Thickness Wound Left groin (Active)   Wound Image   11/11/24 0730   Site Assessment Red;Yellow;Painful 11/11/24 0730   Periwound Assessment Scar tissue;Blanchable erythema 11/11/24 0730   Margins Unattached edges 11/11/24 0730   Closure Secondary intention 11/11/24 0730   Drainage Amount Small 11/11/24 0730   Drainage Description Serosanguineous 11/11/24 0730   Treatments Topical Lidocaine;Cleansed;Nonselective debridement;Site care 11/11/24 0730   Wound Cleansing Hypochlorus Acid 11/11/24 0730   Periwound Protectant No-sting Skin Prep;Drape 11/11/24 0730   Dressing Status Old drainage 11/11/24 0730   Dressing Changed New 11/11/24 0730   Dressing Cleansing/Solutions Not Applicable 11/11/24 0730   Dressing Options Wound Vac 11/11/24 0730   Dressing Change/Treatment Frequency Monday, Wednesday, Friday, and As Needed 11/11/24 0730   Wound Team Following 3x Weekly 11/11/24 0730   Non-staged Wound Description Full thickness 11/11/24 0730   Wound Length (cm) 1.1 cm 11/11/24 0730   Wound Width (cm) 4.6 cm 11/11/24 0730   Wound Depth (cm) 0.7 cm 11/11/24 0730   Wound Surface Area (cm^2) 5.06 cm^2 11/11/24 0730   Wound Volume (cm^3) 3.542 cm^3 11/11/24 0730   Wound Healing % -786 11/11/24 0730   Tunneling (cm) 0 cm 11/11/24 0730   Undermining (cm) 0 cm 11/11/24 0730   Wound Odor None 11/11/24 0730   Pulses Left;DP;PT;Doppler;Not palpable 10/29/24 1500   Exposed Structures Adipose 11/11/24 0730    Number of days: 0       Wound 10/29/24 Full Thickness Wound Left 4th plantar toe (Active)   Wound Image    10/29/24 1500   Site Assessment Yellow;White;Red 10/29/24 1500   Periwound Assessment Maceration;Warm;Blanchable erythema 10/29/24 1500   Margins Unattached edges 10/29/24 1500   Closure Secondary intention 10/29/24 1500   Drainage Amount Moderate 10/29/24 1500   Drainage Description Serosanguineous 10/29/24 1500   Treatments Topical Lidocaine;Cleansed;Site care 10/29/24 1500   Wound Cleansing Normal Saline Irrigation 10/29/24 1500   Periwound Protectant No-sting Skin Prep 10/29/24 1500   Dressing Status Old drainage 10/29/24 1500   Dressing Changed New 10/29/24 1500   Dressing Cleansing/Solutions Not Applicable 10/29/24 1500   Dressing Options Honey Gel;Hydrofiber;Hypafix Tape 10/29/24 1500   Dressing Change/Treatment Frequency Every 72 hrs, and As Needed 10/29/24 1500   Wound Team Following Weekly 10/29/24 1500   Non-staged Wound Description Full thickness 10/29/24 1500   Wound Length (cm) 0.3 cm 10/29/24 1500   Wound Width (cm) 0.6 cm 10/29/24 1500   Wound Depth (cm) 0.4 cm 10/29/24 1500   Wound Surface Area (cm^2) 0.18 cm^2 10/29/24 1500   Wound Volume (cm^3) 0.072 cm^3 10/29/24 1500   Tunneling (cm) 0 cm 10/29/24 1500   Undermining (cm) 0 cm 10/29/24 1500   Wound Odor None 10/29/24 1500   Pulses Left;DP;PT;Doppler;Not palpable 10/29/24 1500   Exposed Structures Tendon 10/29/24 1500   Number of days: 13       Wound 11/08/24 Incision Groin Left three pieces of foam (Active)   Number of days: 3      PROCEDURES    -2% viscous lidocaine applied topically to wound bed for approximately 5 minutes.  -Non selective with hypochlorous acid and gauze to remove non viable tissue from wound beds.     PATIENT EDUCATION  - Patient educated on wound clinic goals for care, moist wound healing, dressing selection, and how to apply dressing  - Educated on s/s of infection and when to seek emergency medical attention  -  Order for wound supplies placed: No   - Requested next visit on provider: Yes    - PLAN for next visit: Wound vac change; assess Left 4th toe wound.     - Educated on importance of adequate nutrition for wound healing  - Increase protein intake (unless contraindicated by renal status)

## 2024-11-13 ENCOUNTER — OFFICE VISIT (OUTPATIENT)
Dept: WOUND CARE | Facility: MEDICAL CENTER | Age: 59
End: 2024-11-13
Attending: EMERGENCY MEDICINE
Payer: MEDICAID

## 2024-11-13 VITALS
OXYGEN SATURATION: 97 % | TEMPERATURE: 97 F | RESPIRATION RATE: 20 BRPM | HEART RATE: 78 BPM | SYSTOLIC BLOOD PRESSURE: 130 MMHG | DIASTOLIC BLOOD PRESSURE: 80 MMHG

## 2024-11-13 DIAGNOSIS — L97.523 TOE ULCER, LEFT, WITH NECROSIS OF MUSCLE (HCC): ICD-10-CM

## 2024-11-13 DIAGNOSIS — F17.200 NICOTINE USE DISORDER: ICD-10-CM

## 2024-11-13 DIAGNOSIS — I73.9 PAD (PERIPHERAL ARTERY DISEASE) (HCC): ICD-10-CM

## 2024-11-13 DIAGNOSIS — L98.492 SKIN ULCER OF LEFT GROIN WITH FAT LAYER EXPOSED (HCC): ICD-10-CM

## 2024-11-13 PROCEDURE — 3075F SYST BP GE 130 - 139MM HG: CPT | Performed by: STUDENT IN AN ORGANIZED HEALTH CARE EDUCATION/TRAINING PROGRAM

## 2024-11-13 PROCEDURE — 99214 OFFICE O/P EST MOD 30 MIN: CPT

## 2024-11-13 PROCEDURE — 99211 OFF/OP EST MAY X REQ PHY/QHP: CPT

## 2024-11-13 PROCEDURE — 11042 DBRDMT SUBQ TIS 1ST 20SQCM/<: CPT

## 2024-11-13 PROCEDURE — 11045 DBRDMT SUBQ TISS EACH ADDL: CPT | Performed by: STUDENT IN AN ORGANIZED HEALTH CARE EDUCATION/TRAINING PROGRAM

## 2024-11-13 PROCEDURE — 3079F DIAST BP 80-89 MM HG: CPT | Performed by: STUDENT IN AN ORGANIZED HEALTH CARE EDUCATION/TRAINING PROGRAM

## 2024-11-13 PROCEDURE — 99406 BEHAV CHNG SMOKING 3-10 MIN: CPT | Performed by: STUDENT IN AN ORGANIZED HEALTH CARE EDUCATION/TRAINING PROGRAM

## 2024-11-13 PROCEDURE — 11045 DBRDMT SUBQ TISS EACH ADDL: CPT

## 2024-11-13 PROCEDURE — 97605 NEG PRS WND THER DME<=50SQCM: CPT

## 2024-11-13 PROCEDURE — 99214 OFFICE O/P EST MOD 30 MIN: CPT | Mod: 25 | Performed by: STUDENT IN AN ORGANIZED HEALTH CARE EDUCATION/TRAINING PROGRAM

## 2024-11-13 PROCEDURE — 11042 DBRDMT SUBQ TIS 1ST 20SQCM/<: CPT | Performed by: STUDENT IN AN ORGANIZED HEALTH CARE EDUCATION/TRAINING PROGRAM

## 2024-11-13 ASSESSMENT — ENCOUNTER SYMPTOMS
FEVER: 0
DIARRHEA: 0
SENSORY CHANGE: 1
SHORTNESS OF BREATH: 0
COUGH: 0
CLAUDICATION: 1
CHILLS: 0

## 2024-11-13 NOTE — PROGRESS NOTES
Provider Encounter- Lower Extremity Ulcer      HISTORY OF PRESENT ILLNESS  Wound History:    START OF CARE IN CLINIC: 11/11/2024    REFERRING PROVIDER: Henok Alcantara MD     WOUND ETIOLOGY: Arterial   LOCATION: Left groin, left lower abdomen     Left plantar fourth toe: Arterial, trauma     HISTORY: Patient developed recurrent rest pain to left leg as well as numbness to her left first and fifth toes around June 2024.  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  She followed up with Dr. Beltran on 8/29/2024 and plan was to undergo angiogram with intervention.  If endovascular intervention not possible, patient to undergo right to left femorofemoral bypass.  Procedure was scheduled for 9/11/2024, unfortunately patient developed an ulcer to her left groin and left lower abdomen.  Case was canceled.  She followed back up with Dr. Beltarn on 9/24/2024 to assess left groin ulcers since she had been on antibiotics.  She was applying mupirocin ointment which was stinging.  Surgery was delayed until ulcer completely healed to avoid graft infection.  She was instructed to apply Betadine and cover with dry gauze daily.  She had placed a sheet in between her pannus to keep the area dry.  She went to the ED on 10/17/2024 secondary to pain and worsening of ulceration.  She was given a second course of antibiotics.  She returned to ED on 10/24/2024 as of wound and pain continued to worsen.  She was referred to [facility] wound care clinic for further treatment care.    Additionally patient also developed an ulcer to her left fourth toe after patient felt a lump that was causing her pain in between her third and fourth toes.  She  self drained with a clean needle on 10/28/2024.  Patient did not notice any purulence only scant bleeding.  Today she presents with an ulcer with exposed FDL of the fourth toe.    Patient was taken to OR on 11/8 with Dr. Beltran for excisional  debridement of left groin and lower abdomen wound and application of wound VAC. Patient returns to clinic for VAC management.    Pertinent Medical History: Hypertension, tobacco abuse, PVD, hyperlipidemia   ETIOLOGY HISTORY: Diagnosed: 2020. Vascular Surgeon: [facility]. Previous vascular procedures left iliac artery stenting by Dr Gimenez. Compression: None.      TOBACCO USE: Half pack per day    Patient's problem list, allergies, and current medications reviewed and updated in Epic    Interval History:  11/13/2024: Clinic visit with Frankie Haney MD. Patient returns to clinic after being taken to OR for I&D of left groin and lower abdomen wound with application of wound VAC on 11/8. She is tolerating VAC well. She appears to have some periwound dermatitis, will try to use Dermatac drape today. Patient's toe wound macerated, recommend against applying triple Abx ointment.      REVIEW OF SYSTEMS:   Review of Systems   Constitutional:  Negative for chills and fever.   Eyes:         Denies visual impairment   Respiratory:  Negative for cough and shortness of breath.    Cardiovascular:  Positive for claudication (Left leg). Negative for leg swelling.   Gastrointestinal:  Negative for diarrhea.   Skin:         Wound groin left side, left fourth toe wound   Neurological:  Positive for sensory change (Hypersensitive left leg).     PHYSICAL EXAMINATION:   /80   Pulse 78   Temp 36.1 °C (97 °F) (Temporal)   Resp 20   LMP 10/13/2016   SpO2 97%     Physical Exam  Vitals reviewed.   Constitutional:       Appearance: Normal appearance.   Cardiovascular:      Rate and Rhythm: Normal rate.      Pulses: Normal pulses.      Comments: No palpable pedal pulse to PT and DP on L foot  Mono on L pop with Doppler  Mono L femoral with Doppler  Pulmonary:      Effort: Pulmonary effort is normal.   Musculoskeletal:         General: Swelling present.      Right lower leg: Edema present.      Left lower leg: Edema  present.   Skin:     Comments: Left groin wound: Large open wound with some slough and necrotic adipose tissue. Periwound skin dermatitis    Left lower abdomen: Large open wound with some slough and necrotic adipose tissue. Periwound skin dermatitis    Left plantar fourth toe proximal aspect: Wound not improved, macerated. No evidence of gross infection.   Neurological:      Mental Status: She is alert.   Psychiatric:         Mood and Affect: Mood normal.         WOUND ASSESSMENT  Wound 11/11/24 Full Thickness Wound Left lower abdomen (Active)   Wound Image    11/13/24 1015   Site Assessment Red;Yellow 11/13/24 1015   Periwound Assessment Blanchable erythema;Scar tissue 11/13/24 1015   Margins Unattached edges 11/13/24 1015   Closure Secondary intention 11/11/24 0730   Drainage Amount Small 11/13/24 1015   Drainage Description Serosanguineous 11/13/24 1015   Treatments Cleansed;Topical Lidocaine;Provider debridement 11/13/24 1015   Wound Cleansing Hypochlorus Acid 11/13/24 1015   Periwound Protectant Hydrocolloid;Drape 11/13/24 1015   Dressing Status Old drainage 11/11/24 0730   Dressing Changed New 11/13/24 1015   Dressing Cleansing/Solutions Not Applicable 11/13/24 1015   Dressing Options Wound Vac 11/13/24 1015   Dressing Change/Treatment Frequency Monday, Wednesday, Friday, and As Needed 11/13/24 1015   Wound Team Following 3x Weekly 11/11/24 0730   Non-staged Wound Description Full thickness 11/13/24 1015   Wound Length (cm) 3 cm 11/13/24 1015   Wound Width (cm) 7.8 cm 11/13/24 1015   Wound Depth (cm) 2.8 cm 11/13/24 1015   Wound Surface Area (cm^2) 23.4 cm^2 11/13/24 1015   Wound Volume (cm^3) 65.52 cm^3 11/13/24 1015   Post-Procedure Length (cm) 3 cm 11/13/24 1015   Post-Procedure Width (cm) 8.2 cm 11/13/24 1015   Post-Procedure Depth (cm) 3 cm 11/13/24 1015   Post-Procedure Surface Area (cm^2) 24.6 cm^2 11/13/24 1015   Post-Procedure Volume (cm^3) 73.8 cm^3 11/13/24 1015   Wound Healing % -9 11/13/24 1015    Tunneling (cm) 0 cm 11/13/24 1015   Undermining (cm) 0 cm 11/13/24 1015   Wound Odor None 11/13/24 1015   Pulses Left;DP;PT;Not palpable;Doppler 10/29/24 1500   Exposed Structures Adipose 11/13/24 1015   Number of days: 2       Wound 11/11/24 Full Thickness Wound Left groin (Active)   Wound Image    11/13/24 1015   Site Assessment Red;Yellow 11/13/24 1015   Periwound Assessment Blanchable erythema;Scar tissue 11/13/24 1015   Margins Unattached edges 11/13/24 1015   Closure Secondary intention 11/11/24 0730   Drainage Amount Small 11/13/24 1015   Drainage Description Serosanguineous 11/13/24 1015   Treatments Cleansed;Topical Lidocaine;Provider debridement 11/13/24 1015   Wound Cleansing Hypochlorus Acid 11/13/24 1015   Periwound Protectant Hydrocolloid;Drape 11/13/24 1015   Dressing Status Old drainage 11/11/24 0730   Dressing Changed New 11/13/24 1015   Dressing Cleansing/Solutions Not Applicable 11/13/24 1015   Dressing Options Wound Vac 11/13/24 1015   Dressing Change/Treatment Frequency Monday, Wednesday, Friday, and As Needed 11/13/24 1015   Wound Team Following 3x Weekly 11/11/24 0730   Non-staged Wound Description Full thickness 11/13/24 1015   Wound Length (cm) 1.1 cm 11/13/24 1015   Wound Width (cm) 4.2 cm 11/13/24 1015   Wound Depth (cm) 1 cm 11/13/24 1015   Wound Surface Area (cm^2) 4.62 cm^2 11/13/24 1015   Wound Volume (cm^3) 4.62 cm^3 11/13/24 1015   Post-Procedure Length (cm) 2 cm 11/13/24 1015   Post-Procedure Width (cm) 4.2 cm 11/13/24 1015   Post-Procedure Depth (cm) 1 cm 11/13/24 1015   Post-Procedure Surface Area (cm^2) 8.4 cm^2 11/13/24 1015   Post-Procedure Volume (cm^3) 8.4 cm^3 11/13/24 1015   Wound Healing % -1055 11/13/24 1015   Tunneling (cm) 0 cm 11/13/24 1015   Undermining (cm) 0 cm 11/13/24 1015   Wound Odor None 11/13/24 1015   Pulses Left;DP;PT;Doppler;Not palpable 10/29/24 1500   Exposed Structures Adipose 11/13/24 1015   Number of days: 2       Wound 10/29/24 Full Thickness Wound  Left 4th plantar toe (Active)   Wound Image    11/13/24 1015   Site Assessment Pink;Yellow 11/13/24 1015   Periwound Assessment Maceration 11/13/24 1015   Margins Unattached edges 11/13/24 1015   Closure Secondary intention 10/29/24 1500   Drainage Amount Moderate 11/13/24 1015   Drainage Description Serous 11/13/24 1015   Treatments Cleansed;Topical Lidocaine;Provider debridement 11/13/24 1015   Wound Cleansing Hypochlorus Acid 11/13/24 1015   Periwound Protectant No-sting Skin Prep 11/13/24 1015   Dressing Status Old drainage 10/29/24 1500   Dressing Changed New 11/13/24 1015   Dressing Cleansing/Solutions Not Applicable 11/13/24 1015   Dressing Options Hydrofiber Silver;Dry Gauze;Hypafix Tape 11/13/24 1015   Dressing Change/Treatment Frequency Monday, Wednesday, Friday, and As Needed 11/13/24 1015   Wound Team Following Weekly 10/29/24 1500   Non-staged Wound Description Full thickness 11/13/24 1015   Wound Length (cm) 0.3 cm 11/13/24 1015   Wound Width (cm) 0.7 cm 11/13/24 1015   Wound Depth (cm) 0.3 cm 11/13/24 1015   Wound Surface Area (cm^2) 0.21 cm^2 11/13/24 1015   Wound Volume (cm^3) 0.063 cm^3 11/13/24 1015   Post-Procedure Length (cm) 0.4 cm 11/13/24 1015   Post-Procedure Width (cm) 0.8 cm 11/13/24 1015   Post-Procedure Depth (cm) 0.3 cm 11/13/24 1015   Post-Procedure Surface Area (cm^2) 0.32 cm^2 11/13/24 1015   Post-Procedure Volume (cm^3) 0.096 cm^3 11/13/24 1015   Wound Healing % 13 11/13/24 1015   Tunneling (cm) 0 cm 11/13/24 1015   Undermining (cm) 0 cm 11/13/24 1015   Wound Odor None 11/13/24 1015   Pulses DP;Not palpable 11/13/24 1015   Exposed Structures Other (Comments) 11/13/24 1015   Number of days: 15       PROCEDURE: Excisional debridement of left groin and left lower abdomen wound  -2% viscous lidocaine applied topically to wound bed for approximately 5 minutes prior to debridement  -Curette, scissors, forceps used to debride wound bed.  Excisional debridement was performed to remove  devitalized tissue until healthy, bleeding tissue was visualized.   Entire surface of wound, 33 cm2 debrided.  Tissue debrided into the subcutaneous layer.    -Bleeding controlled with manual pressure.    -Wound care completed by wound RN, refer to flowsheet  -Patient tolerated the procedure well, without c/o pain or discomfort.    Pertinent Labs and Diagnostics:    Labs:   A1c:   Lab Results   Component Value Date/Time    HBA1C 5.6 2020 12:57 AM            IMAGING: See epic    VASCULAR STUDIES:   Vascular Studies Resulted in Past Year US-EXTREMITY ARTERY LOWER UNILAT LEFT    Result Date: 2024  Narrative Lower Extremity  Arterial Duplex Report  Vascular Laboratory  CONCLUSIONS  1. There is no hemodynamically significant stenosis in the left lower  extremity. There is 3 vessel runoff to the ankle.  RUBEN CASANOVA  Exam Date:     2024 16:06  Room #:     Inpatient  Priority:     Stat  Ht (in):     65      Wt (lb):     208  Ordering Physician:        SUNNY FANG  Referring Physician:       ANNALISA Gonzalez  Sonographer:               Montana Rodriguez RDCS, RVT  Study Type:  Technical Quality:         Adequate  Age:    59    Gender:     F  MRN:    [id number]  :    [id number]      BSA:    2.01  Indications:     Pain in Limb  CPT Codes:       87124  ICD Codes:       729.5  History:         Previous Vascular Surgery - Angioplasty and or Stent                   placement  on the left leg.  Limitations:                RIGHT  Waveform        Peak Systolic Velocity (cm/s)                  Prox    Prox-Mid  Mid    Mid-Dist  Distal  Triphasic                         159                      CFA                                                             PFA                                                             SFA                                                             POP                                                             AT                                                              PT                                                             AMARI                LEFT  Waveform        Peak Systolic Velocity (cm/s)                  Prox    Prox-Mid  Mid    Mid-Dist  Distal  Monophasic                        118                      CFA  Monophasic      38                                         PFA  Monophasic      79                48               38      SFA  Monophasic                        22                       POP  Monophasic      20                                 28      AT  Monophasic      21                                 15      PT  Monophasic      21                                 9       AMARI  FINDINGS  Left.  There is mild diffuse plaque and monophasic flow throughout the common  femoral, superficial femoral, and popliteal arteries with no  hemodynamically significant stenosis.  Three vessel runoff to the ankle with monophasic  flow and low amplitude.  The three vessels are patent.  Josué Rodríguez MD  (Electronically Signed)  Final Date:      23 June 2024                   16:57      LAST  WOUND CULTURE:  DATE :    Lab Results   Component Value Date/Time    CULTRSULT Light growth usual skin karen. (A) 11/08/2024 12:29 PM    CULTRSULT Candida albicans  Moderate growth   (A) 11/08/2024 12:29 PM    CULTRSULT No Anaerobes isolated. 11/08/2024 12:29 PM              ASSESSMENT AND PLAN:     1. Skin ulcer of left groin with fat layer exposed (HCC)    11/13/2024: Started around September 2024.  Complicated by arterial disease. Patient with wound left lower abdomen and left groin. Returns to clinic after surgical debridement and application of VAC by Dr. Beltran 11/8  - Excisional debridement was performed in clinic, medically necessary to promote wound healing.  - Continue wound VAC due to size and depth of wound.  - Due to increased periwound dermatitis, will use dermatac drape  - Return to clinic 3x weekly for wound VAC management   Wound Care: NPWT  -125mmHg continuous with black foam    2. Toe ulcer, left, with necrosis of muscle ([facility])    11/13/2024:  - Wound largely unchanged  - Healing compromised by poor blood flow  - No need for debridement today  - Due to maceration, recommend against using triple Abx ointment   Wound Care: Hydrofiber silver, dry gauze, tape    3. PAD (peripheral artery disease) ([facility]    11/13/2024:  - Patient following with Dr. Beltran. Plans for angiogram and possible fem fem bypass, however wounds will need to healed prior to surgical intervention.   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    4. Nicotine use disorder    11/13/2024:   Tobacco cessation education provided for more than 4 minutes, discussed options of nicotine patch, medical treatment with wellbutrin and chantix. Discussed the risks of smoking including increased risk of heart disease, stroke, cancer and COPD. Discussed the benefits of quitting smoking on wound healing and circulation system. Patient reports she has cut down to 1/2 PPD.      PATIENT EDUCATION    - Importance of adequate nutrition for wound healing  -Advised to go to ER for any increased redness, swelling, drainage or odor, or if patient develops fever, chills, nausea or vomiting.    My total time spent caring for the patient on the day of the encounter was 30 minutes, reviewing history, assessment, counseling and education, and coordination of care.  This does not include time spent on separately billable procedures/tests.    Please note that this note may have been created using voice recognition software. I have worked with technical experts from [facility] [employer] to optimize the interface.  I have made every reasonable attempt to correct obvious errors, but there may be errors of grammar and possibly     N

## 2024-11-13 NOTE — PROGRESS NOTES
Patient returns to the [facility] clinic this afternoon complaining of a wound vac leak and pain to the wound site. She was seen earlier today by a different wound care RN and provider. The wound was debrided and wound vac placed. Patient states she was just sitting at home watching TV when the vac started alarming due to a leak. Patient arrived with dressing still in place and was adamant she didn't want it removed due to pain when removing the vac. Upon assessment this RN found the that the trac pad was half pulled off, this was re-secured with 4 strips of vac drape. This RN also found 2 micro leaks towards the bottom of the dressing, this was sealed with another 2 strips of vac drape. The patient at this point was also complaining about something hard in the middle of the dressing that kept pushing/rubbing on her skin causing pain. The vac foam at this location was bunched up and causing a hard point under suction. This RN de-bulked the black foam and re-sealed the dressing, allowing it to lay flatter under suction. The vac pump was again set to 125 mmhg, continuous suction. No leaks detected. Patient tolerated the procedure well.

## 2024-11-13 NOTE — PROGRESS NOTES
RN Wound Care Procedures 11/13/2024:  Wound vac reapplied to the left lower abdomen and left groin wounds. Placed one piece of thin Hydrocolloid over the skin bridge between the wounds. Used 5 pieces of black granufoam (one piece into left groin wound, one piece into left lower abdomen wound, one strip as a bridge between the two wounds, one long strip bridged from the left lower abdomen wound to the left mid-abdomen [at about the level of the umbilicus], and one piece as a cushion for the trac pad). Dermatac drape used today due to possible skin irritation with traditional vac drape. Negative pressure wound therapy resumed at 125 mmHg continuous pressure, no leaks noted. Advised patient that if she tolerates Dermatac drape without issues, we can order Dermatac drape from [employer] to be delivered to her home.    Discussed plan of care and rationale for wound vac. Advised patient that if the wound vac leaks and she is unable to fix the leak for over 2 hours, she is to remove all tape and foam and cover wound with moist gauze dressing until she returns to the wound clinic.    Patient asked if there is anything she can do to reduce pain with foam removal during wound vac changes. Advised patient to turn off the wound vac machine one hour prior to her visits (this will allow some fluid to accumulate in the wound vac foam which should hopefully loosen the foam from the tissue and ease foam removal).

## 2024-11-13 NOTE — PATIENT INSTRUCTIONS
- Keep dressings clean and dry. Change dressings every 3-4 days, and if they become over saturated, soiled or fall off.     - If you need to change your dressings at home: Wash your wound with normal saline, wound cleanser, or unscented soap and water prior to applying your new dressings. Please avoid cleansing with hydrogen peroxide or rubbing alcohol. Hydrogen peroxide and rubbing alcohol are toxic to new tissue and skin cells.    - Wound vac may not have any drainage in tube or cannister & it will still be working.   Change cannister if it does become full by pressing tab on side of machine to remove canister and snap on new one. Full canister can be thrown in the trash. If cannister fills with bright red blood - go to ER. Dressing will be changed every MWF at the wound clini.  If you are having issues with your wound VAC, please consider patching leaks, changing the canister, or calling [phone] for troubleshooting. If the wound VAC has been off or un-operational for over 2 hours, call wound care center to inform them and remove all dressings including black foam and replace with normal saline damp gauze.     - Should you experience any significant changes in your wound(s), such as infection (redness, swelling, localized heat, increased pain, fever > 101 F, chills) or have any questions regarding your home care instructions, please contact the wound center at [phone]. If after hours, contact your primary care physician or go to the hospital emergency room.     - If you are admitted to any hospital, you will need a new referral to come back to the wound clinic and any scheduled appointments that you already have, may be cancelled.    - If you are 5 or more minutes late for an appointment, we reserve the right to cancel and reschedule that appointment. Additionally, if you are habitually late or not showing (3 late cancellations and/or no shows), we reserve the right to cancel your remaining  appointments and it will be your responsibility to obtain a new referral if services are still needed.

## 2024-11-13 NOTE — PATIENT INSTRUCTIONS
-You have a wound vac at 125 mmHg continuous pressure with black foam. The dressing will be changed every Monday, Wednesday, and Friday at the wound clinic.    -Your wound vac battery lasts approximately six hours. Charge your wound vac machine overnight and when you are at home.    -If your wound vac has a leak, you can seal the leak with additional wound vac drape/tape.    -If you are having issues with your wound vac, please consider patching leaks, changing the canister, or calling [phone] for troubleshooting. If the wound vac has been off or non-operational for over 2 hours, call wound care center to inform them and remove all dressings including black foam and replace with gauze moistened with normal saline.     -Should you experience any significant changes in your wounds, such as signs of infection (increasing redness, swelling, localized heat, increased pain, fever > 101 F, chills) or have any questions regarding your home care instructions, please contact the wound center at [phone]. If after hours, contact your primary care physician or go to the hospital emergency room.

## 2024-11-15 ENCOUNTER — NON-PROVIDER VISIT (OUTPATIENT)
Dept: WOUND CARE | Facility: MEDICAL CENTER | Age: 59
End: 2024-11-15
Attending: EMERGENCY MEDICINE
Payer: MEDICAID

## 2024-11-15 PROCEDURE — 99211 OFF/OP EST MAY X REQ PHY/QHP: CPT

## 2024-11-15 NOTE — PATIENT INSTRUCTIONS
-Keep your wound dressing clean, dry, and intact. Only change dressing if it's over saturated, soiled or falls off.     -Should you experience any significant changes in your wound(s), such as infection (redness, swelling, localized heat, increased pain, fever > 101 F, chills) or have any questions regarding your home care instructions, please contact the wound center at [phone]. If after hours, contact your primary care physician or go to the hospital emergency room.  If you are admitted to any hospital, you will need a new referral to come back to the wound clinic and any scheduled appointments that you already have, may be cancelled.

## 2024-11-15 NOTE — PROGRESS NOTES
Wound vac therapy paused for the weekend, as patient is experiencing intense pain and discomfort. Periwound is also irritated and fragile. Instructions provided on dressing changes until Monday appointment. Therapy paused on [facility] website.

## 2024-11-18 ENCOUNTER — NON-PROVIDER VISIT (OUTPATIENT)
Dept: WOUND CARE | Facility: MEDICAL CENTER | Age: 59
End: 2024-11-18
Attending: EMERGENCY MEDICINE
Payer: MEDICAID

## 2024-11-18 NOTE — PROGRESS NOTES
Patient overslept this morning and is not going to be able to make it to appointment today. Had a long phone discussion with patient about using the wound vac. Patient states that she has so much pain with the wound vac and would like to quit using it. Explained that it will take a lot longer to heal without the wound vac, as patient has another surgery to revascularize once the wound is healed, and patient is hesitant to send it back to [facility] yet. Asked that she bring it to appointment on Wednesday and we can answer all of her questions and concerns at that time. Patient agreeable.

## 2024-11-19 ENCOUNTER — TELEPHONE (OUTPATIENT)
Dept: WOUND CARE | Facility: MEDICAL CENTER | Age: 59
End: 2024-11-19
Payer: MEDICAID

## 2024-11-19 NOTE — TELEPHONE ENCOUNTER
"Phone call from patient stating that she received a phone call from a [employer] rep stating they noticed the vac was not on over the weekend and asked why. Patient stated that she was in too much pain with the wound vac and the drape (regular and dermatac). Patient stated that the [employer] rep recommended that the pressure of the wound vac get decreased to 100mmhg and that we could try a \"star barrier over the skin and dressing\", patient may be referring to skin barrier. This RN informed patient that it is recommended that she come into clinic tomorrow and be seen by a provider to assess the patient for possible infection and could address the plan of care at that time. Patient stated that she understood and in agreement with plan of care.  "

## 2024-11-20 ENCOUNTER — APPOINTMENT (OUTPATIENT)
Dept: WOUND CARE | Facility: MEDICAL CENTER | Age: 59
End: 2024-11-20
Attending: EMERGENCY MEDICINE
Payer: MEDICAID

## 2024-11-20 VITALS
OXYGEN SATURATION: 95 % | TEMPERATURE: 97.4 F | HEART RATE: 90 BPM | DIASTOLIC BLOOD PRESSURE: 86 MMHG | RESPIRATION RATE: 20 BRPM | SYSTOLIC BLOOD PRESSURE: 124 MMHG

## 2024-11-20 DIAGNOSIS — L98.492 SKIN ULCER OF LEFT GROIN WITH FAT LAYER EXPOSED (HCC): ICD-10-CM

## 2024-11-20 DIAGNOSIS — L97.523 TOE ULCER, LEFT, WITH NECROSIS OF MUSCLE (HCC): ICD-10-CM

## 2024-11-20 DIAGNOSIS — F17.200 NICOTINE USE DISORDER: ICD-10-CM

## 2024-11-20 DIAGNOSIS — I73.9 PAD (PERIPHERAL ARTERY DISEASE) (HCC): ICD-10-CM

## 2024-11-20 PROCEDURE — 11045 DBRDMT SUBQ TISS EACH ADDL: CPT | Performed by: NURSE PRACTITIONER

## 2024-11-20 PROCEDURE — 11045 DBRDMT SUBQ TISS EACH ADDL: CPT

## 2024-11-20 PROCEDURE — 3079F DIAST BP 80-89 MM HG: CPT | Performed by: NURSE PRACTITIONER

## 2024-11-20 PROCEDURE — 11042 DBRDMT SUBQ TIS 1ST 20SQCM/<: CPT

## 2024-11-20 PROCEDURE — 11042 DBRDMT SUBQ TIS 1ST 20SQCM/<: CPT | Performed by: NURSE PRACTITIONER

## 2024-11-20 PROCEDURE — 3074F SYST BP LT 130 MM HG: CPT | Performed by: NURSE PRACTITIONER

## 2024-11-20 PROCEDURE — 99213 OFFICE O/P EST LOW 20 MIN: CPT | Mod: 25 | Performed by: NURSE PRACTITIONER

## 2024-11-20 PROCEDURE — 99213 OFFICE O/P EST LOW 20 MIN: CPT

## 2024-11-20 PROCEDURE — 97605 NEG PRS WND THER DME<=50SQCM: CPT

## 2024-11-20 RX ORDER — NICOTINE 21 MG/24HR
1 PATCH, TRANSDERMAL 24 HOURS TRANSDERMAL EVERY 24 HOURS
Qty: 30 PATCH | Refills: 0 | Status: SHIPPED | OUTPATIENT
Start: 2024-11-20

## 2024-11-20 NOTE — PATIENT INSTRUCTIONS
Wound VAC at 125mmHg continuous or intermittent with black foam. Dressing will be changed every MWF at the wound clinic or with your home health nurse.  If you are having issues with your wound VAC, please consider patching leaks, changing the canister, or calling [phone] for troubleshooting. If the wound VAC has been off or un-operational for over 2 hours, call wound care center to inform them and remove all dressings including black foam and replace with normal saline damp gauze.     Should you experience any significant changes in your wound(s), such as infection (redness, swelling, localized heat, increased pain, fever > 101 F, chills) or have any questions regarding your home care instructions, please contact the wound center at [phone]. If after hours, contact your primary care physician or go to the hospital emergency room.   Keep dressing clean, dry and cover when bathing. Only change dressing if it's over saturated, soiled or falls off.    Arrive 15 minutes early to be able to take off your own dressing yourself for more painless removal.   Stop smoking & use your patch as prescribed.

## 2024-11-20 NOTE — PROGRESS NOTES
Provider Encounter- Lower Extremity Ulcer      HISTORY OF PRESENT ILLNESS  Wound History:    START OF CARE IN CLINIC: 11/11/2024    REFERRING PROVIDER: Henok Alcantara MD     WOUND ETIOLOGY: Arterial   LOCATION: Left groin, left lower abdomen     Left plantar fourth toe: Arterial, trauma     HISTORY: Patient developed recurrent rest pain to left leg as well as numbness to her left first and fifth toes around June 2024.  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  She followed up with Dr. Beltran on 8/29/2024 and plan was to undergo angiogram with intervention.  If endovascular intervention not possible, patient to undergo right to left femorofemoral bypass.  Procedure was scheduled for 9/11/2024, unfortunately patient developed an ulcer to her left groin and left lower abdomen.  Case was canceled.  She followed back up with Dr. Beltran on 9/24/2024 to assess left groin ulcers since she had been on antibiotics.  She was applying mupirocin ointment which was stinging.  Surgery was delayed until ulcer completely healed to avoid graft infection.  She was instructed to apply Betadine and cover with dry gauze daily.  She had placed a sheet in between her pannus to keep the area dry.  She went to the ED on 10/17/2024 secondary to pain and worsening of ulceration.  She was given a second course of antibiotics.  She returned to ED on 10/24/2024 as of wound and pain continued to worsen.  She was referred to [facility] wound care clinic for further treatment care.    Additionally patient also developed an ulcer to her left fourth toe after patient felt a lump that was causing her pain in between her third and fourth toes.  She  self drained with a clean needle on 10/28/2024.  Patient did not notice any purulence only scant bleeding.  Today she presents with an ulcer with exposed FDL of the fourth toe.    Patient was taken to OR on 11/8 with Dr. Beltran for excisional  debridement of left groin and lower abdomen wound and application of wound VAC. Patient returns to clinic for VAC management.    Pertinent Medical History: Hypertension, tobacco abuse, PVD, hyperlipidemia   ETIOLOGY HISTORY: Diagnosed: 2020. Vascular Surgeon: [facility]. Previous vascular procedures left iliac artery stenting by Dr Gimenez. Compression: None.      TOBACCO USE: Half pack per day    Patient's problem list, allergies, and current medications reviewed and updated in Epic    Interval History:  11/13/2024: Clinic visit with Frankie Haney MD. Patient returns to clinic after being taken to OR for I&D of left groin and lower abdomen wound with application of wound VAC on 11/8. She is tolerating VAC well. She appears to have some periwound dermatitis, will try to use Dermatac drape today. Patient's toe wound macerated, recommend against applying triple Abx ointment.    11/20/2024 : Clinic visit with ILDA Dodd, MAYELA-BC, CWESTEFANIAN, CFCN.   Patient is still having considerable pain from her wounds.  VAC has been off since last week.  Appears to have peristomal yeast dermatitis, crusted with nystatin.  VAC resumed with pressure at 100 mmHg   Patient continues to smoke, admits to 1/2 pack/day.  Reviewed adverse effects of nicotine on wound healing.  I offered to prescribe Chantix or nicotine patches.  Patient opted for patches.  Rx sent to patient's pharmacy.    REVIEW OF SYSTEMS:   Unchanged from previous clinic visit on 11/13/2024, except as documented in interval history above      PHYSICAL EXAMINATION:   /86   Pulse 90   Temp 36.3 °C (97.4 °F) (Temporal)   Resp 20   LMP 10/13/2016   SpO2 95%     Physical Exam  Vitals reviewed.   Constitutional:       Appearance: Normal appearance.   Cardiovascular:      Rate and Rhythm: Normal rate.      Pulses: Normal pulses.      Comments: No palpable pedal pulse to PT and DP on L foot  Mono on L pop with Doppler  Mono L femoral with  Doppler  Pulmonary:      Effort: Pulmonary effort is normal.   Musculoskeletal:         General: Swelling present.      Right lower leg: Edema present.      Left lower leg: Edema present.   Skin:     Comments: Left groin wound: Full-thickness wound.  No significant change in wound area or depth.  Slough and senescent red tissue to wound bed. Moderate serosanguineous drainage, no odor.  Yeast dermatitis to periwound.    Left lower abdomen:  Full-thickness wound.  No significant change in wound area or depth.  Slough and senescent red tissue to wound bed. Moderate serosanguineous drainage, no odor.  Yeast dermatitis to periwound.    Left plantar fourth toe proximal aspect: No change in area or depth.  Periwound macerated.  No need for debridement today   Neurological:      Mental Status: She is alert.   Psychiatric:         Mood and Affect: Mood normal.         WOUND ASSESSMENT  Wound 11/11/24 Full Thickness Wound Left lower abdomen (Active)   Wound Image    11/20/24 1000   Site Assessment Yellow;Red 11/20/24 1000   Periwound Assessment Red;Fragile;Rash 11/20/24 1000   Margins Defined edges;Unattached edges 11/20/24 1000   Closure Secondary intention 11/11/24 0730   Drainage Amount Small 11/20/24 1000   Drainage Description Serosanguineous;Serous 11/20/24 1000   Treatments Cleansed;Topical Lidocaine;Provider debridement 11/20/24 1000   Wound Cleansing Hypochlorus Acid 11/20/24 1000   Periwound Protectant No-sting Skin Prep;Antifungal Therapy;Hydrocolloid 11/20/24 1000   Dressing Status Old drainage 11/11/24 0730   Dressing Changed New 11/13/24 1015   Dressing Cleansing/Solutions Not Applicable 11/20/24 1000   Dressing Options Wound Vac 11/20/24 1000   Dressing Change/Treatment Frequency Monday, Wednesday, Friday, and As Needed 11/20/24 1000   Wound Team Following 3x Weekly 11/20/24 1000   Non-staged Wound Description Full thickness 11/20/24 1000   Wound Length (cm) 3 cm 11/20/24 1000   Wound Width (cm) 8 cm 11/20/24  1000   Wound Depth (cm) 2.2 cm 11/20/24 1000   Wound Surface Area (cm^2) 24 cm^2 11/20/24 1000   Wound Volume (cm^3) 52.8 cm^3 11/20/24 1000   Post-Procedure Length (cm) 3 cm 11/20/24 1000   Post-Procedure Width (cm) 8 cm 11/20/24 1000   Post-Procedure Depth (cm) 2.2 cm 11/20/24 1000   Post-Procedure Surface Area (cm^2) 24 cm^2 11/20/24 1000   Post-Procedure Volume (cm^3) 52.8 cm^3 11/20/24 1000   Wound Healing % 12 11/20/24 1000   Tunneling (cm) 0 cm 11/20/24 1000   Undermining (cm) 0 cm 11/20/24 1000   Wound Odor None 11/20/24 1000   Pulses Left;DP;PT;Doppler;Not palpable 11/20/24 1000   Exposed Structures Adipose 11/20/24 1000   Number of days: 9       Wound 11/11/24 Full Thickness Wound Left groin (Active)   Wound Image    11/20/24 1000   Site Assessment Yellow;Slough;Red 11/20/24 1000   Periwound Assessment Fragile;Red;Rash 11/20/24 1000   Margins Defined edges;Unattached edges 11/20/24 1000   Closure Secondary intention 11/11/24 0730   Drainage Amount Small 11/20/24 1000   Drainage Description Serosanguineous;Serous 11/20/24 1000   Treatments Cleansed;Topical Lidocaine;Provider debridement 11/20/24 1000   Wound Cleansing Hypochlorus Acid 11/20/24 1000   Periwound Protectant No-sting Skin Prep;Antifungal Therapy 11/20/24 1000   Dressing Status Old drainage 11/11/24 0730   Dressing Changed New 11/13/24 1015   Dressing Cleansing/Solutions Not Applicable 11/20/24 1000   Dressing Options Wound Vac 11/20/24 1000   Dressing Change/Treatment Frequency Monday, Wednesday, Friday, and As Needed 11/20/24 1000   Wound Team Following 3x Weekly 11/11/24 0730   Non-staged Wound Description Full thickness 11/20/24 1000   Wound Length (cm) 2.1 cm 11/20/24 1000   Wound Width (cm) 4.3 cm 11/20/24 1000   Wound Depth (cm) 1.1 cm 11/20/24 1000   Wound Surface Area (cm^2) 9.03 cm^2 11/20/24 1000   Wound Volume (cm^3) 9.933 cm^3 11/20/24 1000   Post-Procedure Length (cm) 2.2 cm 11/20/24 1000   Post-Procedure Width (cm) 4.3 cm 11/20/24  1000   Post-Procedure Depth (cm) 1.3 cm 11/20/24 1000   Post-Procedure Surface Area (cm^2) 9.46 cm^2 11/20/24 1000   Post-Procedure Volume (cm^3) 12.298 cm^3 11/20/24 1000   Wound Healing % -2383 11/20/24 1000   Tunneling (cm) 0 cm 11/20/24 1000   Undermining (cm) 0 cm 11/20/24 1000   Wound Odor None 11/20/24 1000   Pulses Left;DP;PT;Doppler;Not palpable 10/29/24 1500   Exposed Structures Adipose 11/20/24 1000   Number of days: 9       Wound 10/29/24 Full Thickness Wound Left 4th plantar toe (Active)   Wound Image   11/20/24 1000   Site Assessment Chapman 11/20/24 1000   Periwound Assessment Maceration 11/20/24 1000   Margins Defined edges 11/20/24 1000   Closure Secondary intention 10/29/24 1500   Drainage Amount АЛЕКСАНДР 11/20/24 1000   Drainage Description Serous 11/13/24 1015   Treatments Cleansed;Topical Lidocaine 11/20/24 1000   Wound Cleansing Hypochlorus Acid 11/20/24 1000   Periwound Protectant No-sting Skin Prep;Antifungal Therapy 11/20/24 1000   Dressing Status Old drainage 10/29/24 1500   Dressing Changed New 11/20/24 1000   Dressing Cleansing/Solutions Not Applicable 11/20/24 1000   Dressing Options Hydrofiber Silver;Dry Gauze;Hypafix Tape 11/20/24 1000   Dressing Change/Treatment Frequency Monday, Wednesday, Friday, and As Needed 11/20/24 1000   Wound Team Following Weekly 10/29/24 1500   Non-staged Wound Description Full thickness 11/20/24 1000   Wound Length (cm) 1.1 cm 11/20/24 1000   Wound Width (cm) 0.3 cm 11/20/24 1000   Wound Depth (cm) 0.2 cm 11/20/24 1000   Wound Surface Area (cm^2) 0.33 cm^2 11/20/24 1000   Wound Volume (cm^3) 0.066 cm^3 11/20/24 1000   Post-Procedure Length (cm) 0.4 cm 11/13/24 1015   Post-Procedure Width (cm) 0.8 cm 11/13/24 1015   Post-Procedure Depth (cm) 0.3 cm 11/13/24 1015   Post-Procedure Surface Area (cm^2) 0.32 cm^2 11/13/24 1015   Post-Procedure Volume (cm^3) 0.096 cm^3 11/13/24 1015   Wound Healing % 8 11/20/24 1000   Tunneling (cm) 0 cm 11/20/24 1000   Undermining (cm) 0  cm 11/20/24 1000   Wound Odor None 11/20/24 1000   Pulses Left;DP;PT;Not palpable;Doppler 11/20/24 1000   Exposed Structures АЛЕКСАНДР 11/20/24 1000   Number of days: 22       PROCEDURE: Excisional debridement of left groin and left lower abdomen wound  -2% viscous lidocaine applied topically to wound bed for approximately 5 minutes prior to debridement  -Curette, scissors, forceps used to debride wound bed.  Excisional debridement was performed to remove devitalized tissue until healthy, bleeding tissue was visualized.   Entire surface of wound, 33.46 cm² debrided.  Tissue debrided into the subcutaneous layer.    -Bleeding controlled with manual pressure.    -Wound care completed by wound RN, refer to flowsheet  -Patient tolerated the procedure well, without c/o pain or discomfort.    Pertinent Labs and Diagnostics:    Labs:   A1c:   Lab Results   Component Value Date/Time    HBA1C 5.6 05/17/2020 12:57 AM            IMAGING: See epic    VASCULAR STUDIES:   Vascular Studies Resulted in Past Year US-EXTREMITY ARTERY LOWER UNILAT LEFT    Result Date: 6/23/2024  Narrative Lower Extremity  Arterial Duplex Report  Vascular Laboratory   FINDINGS  Left.  There is mild diffuse plaque and monophasic flow throughout the common  femoral, superficial femoral, and popliteal arteries with no  hemodynamically significant stenosis.  Three vessel runoff to the ankle with monophasic  flow and low amplitude.  The three vessels are patent.  Josué Rodríguez MD  (Electronically Signed)  Final Date:      23 June 2024                   16:57      LAST  WOUND CULTURE:  DATE :    Lab Results   Component Value Date/Time    CULTRSULT Light growth usual skin karen. (A) 11/08/2024 12:29 PM    CULTRSULT Candida albicans  Moderate growth   (A) 11/08/2024 12:29 PM    CULTRSULT No Anaerobes isolated. 11/08/2024 12:29 PM              ASSESSMENT AND PLAN:     1. Skin ulcer of left groin with fat layer exposed (HCC)    11/20/2024: VAC has been held since last  Friday due to discomfort and difficulty maintaining seal.  - Excisional debridement was performed in clinic, medically necessary to promote wound healing.  -Resume wound VAC.  Decrease pressure to 100 mmHg  -Periwound crusted with nystatin powder  - Return to clinic 3x weekly for wound VAC management   Wound Care: NPWT -100mmHg continuous with black foam    2. Toe ulcer, left, with necrosis of muscle ([facility])    11/20/2024: No change in area or depth  -No excisional debridement in clinic today  -Manage moisture   Wound Care: Hydrofiber silver, dry gauze, tape    3. PAD (peripheral artery disease) ([facility]    11/20/2024:  - Patient following with Dr. Beltran. Plans for angiogram and possible fem fem bypass, however wounds will need to healed prior to surgical intervention.   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    4. Nicotine use disorder    11/20/2024: Patient continues to smoke 1 pack/day despite her diagnosis of arterial insufficiency.  -Reiterated importance of tobacco cessation in order to optimize her chances of wound healing  -Rx for nicotine patches sent to patient's pharmacy      PATIENT EDUCATION    - Importance of adequate nutrition for wound healing  -Advised to go to ER for any increased redness, swelling, drainage or odor, or if patient develops fever, chills, nausea or vomiting.    My total time spent caring for the patient on the day of the encounter was 20 minutes, reviewing history, assessment, counseling and education, and coordination of care.  This does not include time spent on separately billable procedures/tests.    Please note that this note may have been created using voice recognition software. I have worked with technical experts from [employer] to optimize the interface.  I have made every reasonable attempt to correct obvious errors, but there may be errors of grammar and possibly     N

## 2024-11-20 NOTE — PROGRESS NOTES
Applied NPWT to left groin & left abdominal wound with 1 piece of black foam into wound bed, then 2 pieces to bridge to abdomen, and 1 piece of button black foam under track pad (4 pieces total); sealed with drape to 100 mmHg continuous per APRN. Used hydrocolloid to protect skin from drape & black foam; as well as minimal use of drape. No leaks noted. Educated patient regarding smoking cessation importance, pain & poor blood flow, and how to manage NPWT machine.   Application went better for patient when she held up her own pannus, as well as removed her own previous dressing; patient plans to show up early for her appointments to remove her own dressings to lessen her pain.

## 2024-11-21 ENCOUNTER — HOSPITAL ENCOUNTER (EMERGENCY)
Facility: MEDICAL CENTER | Age: 59
End: 2024-11-21
Attending: EMERGENCY MEDICINE
Payer: MEDICAID

## 2024-11-21 ENCOUNTER — TELEPHONE (OUTPATIENT)
Dept: WOUND CARE | Facility: MEDICAL CENTER | Age: 59
End: 2024-11-21
Payer: MEDICAID

## 2024-11-21 VITALS
WEIGHT: 214.95 LBS | RESPIRATION RATE: 18 BRPM | BODY MASS INDEX: 35.81 KG/M2 | HEIGHT: 65 IN | TEMPERATURE: 97.9 F | DIASTOLIC BLOOD PRESSURE: 67 MMHG | OXYGEN SATURATION: 92 % | SYSTOLIC BLOOD PRESSURE: 146 MMHG | HEART RATE: 78 BPM

## 2024-11-21 DIAGNOSIS — Z51.89 ENCOUNTER FOR WOUND RE-CHECK: ICD-10-CM

## 2024-11-21 LAB
ALBUMIN SERPL BCP-MCNC: 4.1 G/DL (ref 3.2–4.9)
ALBUMIN/GLOB SERPL: 1.2 G/DL
ALP SERPL-CCNC: 143 U/L (ref 30–99)
ALT SERPL-CCNC: 31 U/L (ref 2–50)
ANION GAP SERPL CALC-SCNC: 13 MMOL/L (ref 7–16)
AST SERPL-CCNC: 29 U/L (ref 12–45)
BASOPHILS # BLD AUTO: 0.5 % (ref 0–1.8)
BASOPHILS # BLD: 0.05 K/UL (ref 0–0.12)
BILIRUB SERPL-MCNC: 0.4 MG/DL (ref 0.1–1.5)
BUN SERPL-MCNC: 8 MG/DL (ref 8–22)
CALCIUM ALBUM COR SERPL-MCNC: 9.3 MG/DL (ref 8.5–10.5)
CALCIUM SERPL-MCNC: 9.4 MG/DL (ref 8.4–10.2)
CHLORIDE SERPL-SCNC: 105 MMOL/L (ref 96–112)
CO2 SERPL-SCNC: 21 MMOL/L (ref 20–33)
CREAT SERPL-MCNC: 0.5 MG/DL (ref 0.5–1.4)
EOSINOPHIL # BLD AUTO: 0.08 K/UL (ref 0–0.51)
EOSINOPHIL NFR BLD: 0.8 % (ref 0–6.9)
ERYTHROCYTE [DISTWIDTH] IN BLOOD BY AUTOMATED COUNT: 48.3 FL (ref 35.9–50)
GFR SERPLBLD CREATININE-BSD FMLA CKD-EPI: 109 ML/MIN/1.73 M 2
GLOBULIN SER CALC-MCNC: 3.4 G/DL (ref 1.9–3.5)
GLUCOSE SERPL-MCNC: 112 MG/DL (ref 65–99)
HCT VFR BLD AUTO: 43.3 % (ref 37–47)
HGB BLD-MCNC: 14.7 G/DL (ref 12–16)
IMM GRANULOCYTES # BLD AUTO: 0.03 K/UL (ref 0–0.11)
IMM GRANULOCYTES NFR BLD AUTO: 0.3 % (ref 0–0.9)
LYMPHOCYTES # BLD AUTO: 2.21 K/UL (ref 1–4.8)
LYMPHOCYTES NFR BLD: 22 % (ref 22–41)
MCH RBC QN AUTO: 33.5 PG (ref 27–33)
MCHC RBC AUTO-ENTMCNC: 33.9 G/DL (ref 32.2–35.5)
MCV RBC AUTO: 98.6 FL (ref 81.4–97.8)
MONOCYTES # BLD AUTO: 0.65 K/UL (ref 0–0.85)
MONOCYTES NFR BLD AUTO: 6.5 % (ref 0–13.4)
NEUTROPHILS # BLD AUTO: 7.02 K/UL (ref 1.82–7.42)
NEUTROPHILS NFR BLD: 69.9 % (ref 44–72)
NRBC # BLD AUTO: 0 K/UL
NRBC BLD-RTO: 0 /100 WBC (ref 0–0.2)
PLATELET # BLD AUTO: 323 K/UL (ref 164–446)
PMV BLD AUTO: 10.6 FL (ref 9–12.9)
POTASSIUM SERPL-SCNC: 4.3 MMOL/L (ref 3.6–5.5)
PROT SERPL-MCNC: 7.5 G/DL (ref 6–8.2)
RBC # BLD AUTO: 4.39 M/UL (ref 4.2–5.4)
SODIUM SERPL-SCNC: 139 MMOL/L (ref 135–145)
WBC # BLD AUTO: 10 K/UL (ref 4.8–10.8)

## 2024-11-21 PROCEDURE — 96374 THER/PROPH/DIAG INJ IV PUSH: CPT

## 2024-11-21 PROCEDURE — 99284 EMERGENCY DEPT VISIT MOD MDM: CPT

## 2024-11-21 PROCEDURE — 85025 COMPLETE CBC W/AUTO DIFF WBC: CPT

## 2024-11-21 PROCEDURE — 36415 COLL VENOUS BLD VENIPUNCTURE: CPT

## 2024-11-21 PROCEDURE — 80053 COMPREHEN METABOLIC PANEL: CPT

## 2024-11-21 PROCEDURE — 700111 HCHG RX REV CODE 636 W/ 250 OVERRIDE (IP): Mod: JZ | Performed by: EMERGENCY MEDICINE

## 2024-11-21 RX ORDER — MORPHINE SULFATE 4 MG/ML
4 INJECTION INTRAVENOUS ONCE
Status: COMPLETED | OUTPATIENT
Start: 2024-11-21 | End: 2024-11-21

## 2024-11-21 RX ADMIN — MORPHINE SULFATE 4 MG: 4 INJECTION, SOLUTION INTRAMUSCULAR; INTRAVENOUS at 11:42

## 2024-11-21 ASSESSMENT — FIBROSIS 4 INDEX: FIB4 SCORE: 0.98

## 2024-11-21 ASSESSMENT — PAIN DESCRIPTION - PAIN TYPE: TYPE: ACUTE PAIN

## 2024-11-21 NOTE — ED NOTES
Pt provided with education about follow up care and declines questions. Pt used wheelchair to leave ER where friend is to drive her home.

## 2024-11-21 NOTE — ED NOTES
Pt sates that her pain is significantly improved after ICE pack application. Pt aware that we are awaiting ERP review of results for further plan. Call light in reach.

## 2024-11-21 NOTE — TELEPHONE ENCOUNTER
Telephone call from Reba that she has been in pain since she left the clinic yesterday. She feels like there is tubing in her groin, which is not where the tubing was tracked. It feels like a knot she states. Reba continues to state wounds are much more painful with wound vac on then when it is off. States no drainage in cannister, suction is set at 100mmhg and is still feeling increased pain. Advised to gently remove wound vac after turning suction off for 30 minutes to 1 hour and can remove in shower or with water to ease foam out of wounds. Discussed with Sharee GONSALES and she advises after wound vac removal to go to Emergency Room.

## 2024-11-21 NOTE — ED NOTES
Patient ambulants to the ER complaining of pain in her groin. She reports that she has an open wound from an ingrown hair and is being seen by the wound clinic. She was sent to the ER for a wound check by the wound clinic.

## 2024-11-21 NOTE — TELEPHONE ENCOUNTER
Reba called as she is taking tape off from wound vac and believes it is the tape that is causing her distress. Advised as Sharee GONSALES recommendation to go to the Emergency Department.

## 2024-11-21 NOTE — ED TRIAGE NOTES
"Chief Complaint   Patient presents with    Wound Check     Pt has a known left groin wound that she sees wound care for.   Pt has been having issues with the tape and irritation but has tried to do her best with it till this morning when she called them stating the irritation was jsut to much. Per pt wound care told her to remove the wound vac  Pt is here to have it evaluated since taking off the wound vac she states that the area seems more swollen      BP (!) 146/92   Pulse 100   Temp 36.6 °C (97.9 °F) (Temporal)   Resp 18   Ht 1.651 m (5' 5\")   Wt 97.5 kg (214 lb 15.2 oz)   LMP 10/13/2016   SpO2 95%   BMI 35.77 kg/m²     "

## 2024-11-21 NOTE — ED PROVIDER NOTES
ED Provider Note      Primary care provider: JAMIE Adame  Means of arrival: private vehicle  History obtained from: patient  History limited by: none    CHIEF COMPLAINT  Chief Complaint   Patient presents with    Wound Check     Pt has a known left groin wound that she sees wound care for.   Pt has been having issues with the tape and irritation but has tried to do her best with it till this morning when she called them stating the irritation was jsut to much. Per pt wound care told her to remove the wound vac  Pt is here to have it evaluated since taking off the wound vac she states that the area seems more swollen        HPI/ROS  Reba Nicole is a 59 y.o. female who presents to the Emergency Department for evaluation of wound check.  Patient reports that she has had a chronic wound in her left groin and lower abdomen.  She has been followed by wound care clinic.  She was having issues with the wound VAC and over the weekend last weekend it was taken off to give her a break.  She notes that it was placed back on yesterday and she is having significant pain with the wound VAC on.  She called wound care clinic and they advised her to remove the wound VAC and come to the emergency department for wound check.  Patient reports that the wound VAC was not having any output.  She states that she is having erythema around the wound where the wound VAC tape was, she notes that her skin is sensitive to tape.  She notes that she was previously on Augmentin and completed her course of Augmentin.  Currently not on any antibiotics.  Denies fevers or chills.    EXTERNAL RECORDS REVIEWED  Patient last seen in the emergency department on 10/24/2024 for groin wound.  Laboratory workup was unremarkable and she was advised to continue her chronic antibiotics (Augmentin) and follow-up with wound care.  Patient was seen in wound care clinic yesterday, she does have known peripheral vascular disease.  During wound  "care visit she was thought to have likely yeast dermatitis wound VAC was resumed.  Aside from resuming wound VAC she had debridement of the wound and wound VAC was resumed.  She was also treated with nystatin powder.    Patient did have incision and drainage on 11/13/2024 of groin wound  with Dr. Haney.    LIMITATION TO HISTORY    None    PAST MEDICAL HISTORY   has a past medical history of Arthritis, Back pain, Blood clotting disorder (HCC) (11/07/2024), Dental disorder, Disorder of thyroid, Falls, and Hypertension.    SURGICAL HISTORY   has a past surgical history that includes other abdominal surgery; iliac angioplasty with stent (Left, 5/17/2020); debridement (Left, 11/8/2024); and application or replacement, wound vac (Left, 11/8/2024).    SOCIAL HISTORY  Social History     Tobacco Use    Smoking status: Every Day     Current packs/day: 0.50     Average packs/day: 0.5 packs/day for 44.9 years (22.4 ttl pk-yrs)     Types: Cigarettes     Start date: 1980    Smokeless tobacco: Never   Vaping Use    Vaping status: Every Day    Substances: Nicotine   Substance Use Topics    Alcohol use: Yes     Comment: occ    Drug use: No      Social History     Substance and Sexual Activity   Drug Use No       FAMILY HISTORY  None pertinent    CURRENT MEDICATIONS  Home Medications    **Home medications have not yet been reviewed for this encounter**         ALLERGIES  Allergies   Allergen Reactions    Aspirin Rash and Swelling     Generalized rash, swelling in hands and feet       PHYSICAL EXAM  VITAL SIGNS: BP (!) 146/92   Pulse 100   Temp 36.6 °C (97.9 °F) (Temporal)   Resp 18   Ht 1.651 m (5' 5\")   Wt 97.5 kg (214 lb 15.2 oz)   LMP 10/13/2016   SpO2 95%   BMI 35.77 kg/m²   Vitals reviewed by myself.  Physical Exam  Nursing note and vitals reviewed.  Constitutional: Well-developed and well-nourished. patient appears uncomfortable  HENT: Head is normocephalic and atraumatic.  Eyes: extra-ocular movements " intact  Cardiovascular: Regular rate and  regular rhythm.   Pulmonary/Chest: Breath sounds normal. No wheezes or rales.   Abdominal: Soft and non-tender. No distention.    Musculoskeletal: Extremities exhibit normal range of motion without edema or tenderness.   Neurological: Awake and alert  Skin: Skin is warm and dry. No rash.  Patient has 2 wounds in her left lower abdomen and left groin which both have clean edges and bases.  There is some erythema in the tape distribution from the wound VAC, no purulent drainage, granulation tissue within the wound              DIAGNOSTIC STUDIES:  LABS  Labs Reviewed   CBC WITH DIFFERENTIAL - Abnormal; Notable for the following components:       Result Value    MCV 98.6 (*)     MCH 33.5 (*)     All other components within normal limits   COMP METABOLIC PANEL - Abnormal; Notable for the following components:    Glucose 112 (*)     Alkaline Phosphatase 143 (*)     All other components within normal limits   ESTIMATED GFR       All labs reviewed and independently interpreted by myself        COURSE & MEDICAL DECISION MAKING      INITIAL ASSESSMENT, ED COURSE AND PLAN    Patient is a 59-year-old female who presents for evaluation of wound check.  Differential diagnosis includes persistent wound, infected wound, contact dermatitis.  At this time I believe the erythema around the wound is likely sensitivity reaction to the tape, will obtain some screening labs to assess her white count.      Patient's initial vitals notable for hypertension.  Patient is having some significant discomfort after removing the wound VAC and therefore we will give her morphine.  Wound is dressed with Xeroform and gauze.  Labs returned and are unremarkable.  No leukocytosis.  Therefore at this time I advised patient low suspicion for recurrent infection I would not restart antibiotics at this time.  Since she had a sensitivity reaction to the tape a Xeroform dressing was applied with an abdominal pad and  mesh underwear, patient is feeling improved with this dressing.  She has follow-up with wound care tomorrow which she is advised to keep.  She is then given strict return precautions and discharged in stable condition.       DISPOSITION AND DISCUSSIONS  I have discussed management of the patient with the following physicians and LUC's:  none    Discussion of management with other QHP or appropriate source(s): none     Escalation of care considered, and ultimately not performed:see above    Barriers to care at this time, including but not limited to: none.     Decision tools and prescription drugs considered including, but not limited to: see above.        FINAL IMPRESSION  1. Encounter for wound re-check

## 2024-11-22 ENCOUNTER — OFFICE VISIT (OUTPATIENT)
Dept: WOUND CARE | Facility: MEDICAL CENTER | Age: 59
End: 2024-11-22
Attending: EMERGENCY MEDICINE
Payer: MEDICAID

## 2024-11-22 DIAGNOSIS — T14.8XXA WOUND INFECTION: ICD-10-CM

## 2024-11-22 DIAGNOSIS — B36.9 FUNGAL DERMATITIS: ICD-10-CM

## 2024-11-22 DIAGNOSIS — L08.9 WOUND INFECTION: ICD-10-CM

## 2024-11-22 PROCEDURE — 99212 OFFICE O/P EST SF 10 MIN: CPT | Performed by: STUDENT IN AN ORGANIZED HEALTH CARE EDUCATION/TRAINING PROGRAM

## 2024-11-22 PROCEDURE — 99213 OFFICE O/P EST LOW 20 MIN: CPT

## 2024-11-22 RX ORDER — FLUCONAZOLE 200 MG/1
400 TABLET ORAL DAILY
Qty: 28 TABLET | Refills: 0 | Status: SHIPPED | OUTPATIENT
Start: 2024-11-22 | End: 2024-12-06

## 2024-11-22 NOTE — PROGRESS NOTES
"Wound vac discontinued this visit due to patient intolerance of tape. Patient states that \"gentle\" drape was worse than regular drape. Adhesives cause extreme pain and irritation to wound and gene wound area. Patient instructed to return wound vac to [facility].     Appointments decreased to once weekly for 60 minutes. Patient agreeable with this plan. This RN spoke with Tere DEE to notify of changes.     Normal saline moistened gauze and ABD pad to wounds. NO TAPE. Patient instructed to change dressings daily. Orders for wound care supplies entered via [employer] online portal.  "

## 2024-11-22 NOTE — PROGRESS NOTES
Advanced Wound Care   [facility]   [address]   [address]   [place] [address]   [phone] Fax: [phone]        DME: [employer]  Duration of Supply Order: 90 days  Dispense as written.     Wound 11/11/24 Full Thickness Wound Left lower abdomen (Active)   Wound Image    11/20/24 1000   Site Assessment Painful;Yellow;Red 11/22/24 0839   Periwound Assessment Rash;Painful 11/22/24 0839   Margins Unattached edges 11/22/24 0839   Closure Secondary intention 11/11/24 0730   Drainage Amount Moderate 11/22/24 0839   Drainage Description Serosanguineous 11/22/24 0839   Treatments Cleansed;Site care 11/22/24 0839   Wound Cleansing Hypochlorus Acid 11/22/24 0839   Periwound Protectant Moisture Barrier 11/22/24 0839   Dressing Status Old drainage 11/11/24 0730   Dressing Changed New 11/13/24 1015   Dressing Cleansing/Solutions Normal Saline 11/22/24 0839   Dressing Options Moist Roll Gauze;Absorbent Abdominal Pad 11/22/24 0839   Dressing Change/Treatment Frequency Daily, and As Needed 11/22/24 0839   Wound Team Following 3x Weekly 11/20/24 1000   Non-staged Wound Description Full thickness 11/22/24 0839   Wound Length (cm) 3 cm 11/20/24 1000   Wound Width (cm) 8 cm 11/20/24 1000   Wound Depth (cm) 2.2 cm 11/20/24 1000   Wound Surface Area (cm^2) 24 cm^2 11/20/24 1000   Wound Volume (cm^3) 52.8 cm^3 11/20/24 1000   Post-Procedure Length (cm) 3 cm 11/20/24 1000   Post-Procedure Width (cm) 8 cm 11/20/24 1000   Post-Procedure Depth (cm) 2.2 cm 11/20/24 1000   Post-Procedure Surface Area (cm^2) 24 cm^2 11/20/24 1000   Post-Procedure Volume (cm^3) 52.8 cm^3 11/20/24 1000   Wound Healing % 12 11/20/24 1000   Tunneling (cm) 0 cm 11/20/24 1000   Undermining (cm) 0 cm 11/20/24 1000   Wound Odor None 11/22/24 0839   Pulses Left;DP;PT;Doppler;Not palpable 11/20/24 1000   Exposed Structures Adipose 11/22/24 0839       Wound 11/11/24 Full Thickness Wound Left groin (Active)   Wound Image     11/20/24 1000   Site Assessment Painful;Red;Yellow 11/22/24 0839   Periwound Assessment Rash;Painful 11/22/24 0839   Margins Unattached edges 11/22/24 0839   Closure Secondary intention 11/11/24 0730   Drainage Amount Moderate 11/22/24 0839   Drainage Description Serosanguineous 11/22/24 0839   Treatments Cleansed;Site care 11/22/24 0839   Wound Cleansing Hypochlorus Acid 11/22/24 0839   Periwound Protectant Moisture Barrier 11/22/24 0839   Dressing Status Old drainage 11/11/24 0730   Dressing Changed New 11/13/24 1015   Dressing Cleansing/Solutions Normal Saline 11/22/24 0839   Dressing Options Moist Roll Gauze;Absorbent Abdominal Pad 11/22/24 0839   Dressing Change/Treatment Frequency Daily, and As Needed 11/22/24 0839   Wound Team Following 3x Weekly 11/11/24 0730   Non-staged Wound Description Full thickness 11/22/24 0839   Wound Length (cm) 2.1 cm 11/20/24 1000   Wound Width (cm) 4.3 cm 11/20/24 1000   Wound Depth (cm) 1.1 cm 11/20/24 1000   Wound Surface Area (cm^2) 9.03 cm^2 11/20/24 1000   Wound Volume (cm^3) 9.933 cm^3 11/20/24 1000   Post-Procedure Length (cm) 2.2 cm 11/20/24 1000   Post-Procedure Width (cm) 4.3 cm 11/20/24 1000   Post-Procedure Depth (cm) 1.3 cm 11/20/24 1000   Post-Procedure Surface Area (cm^2) 9.46 cm^2 11/20/24 1000   Post-Procedure Volume (cm^3) 12.298 cm^3 11/20/24 1000   Wound Healing % -2383 11/20/24 1000   Tunneling (cm) 0 cm 11/20/24 1000   Undermining (cm) 0 cm 11/20/24 1000   Wound Odor None 11/22/24 0839   Pulses Left;DP;PT;Doppler;Not palpable 10/29/24 1500   Exposed Structures Adipose 11/22/24 0839

## 2024-11-22 NOTE — PATIENT INSTRUCTIONS
-Keep dressings clean and dry. Change dressings every day, and if they become over saturated, soiled or fall off.     -If you need to change your dressings at home: Wash your wound with normal saline, wound cleanser, or unscented soap and water prior to applying your new dressings. Please avoid cleansing with hydrogen peroxide or rubbing alcohol. Hydrogen peroxide and rubbing alcohol are toxic to new tissue and skin cells.    -Should you experience any significant changes in your wound(s), such as signs of infection (increasing redness, swelling, localized heat, increased pain, fever > 101 F, chills) or have any questions regarding your home care instructions, please contact the wound center at [phone]. If after hours, contact your primary care physician or go to the hospital emergency room.     -If you are admitted to any hospital, you will need a new referral to come back to the wound clinic and any scheduled appointments that you already have, may be cancelled.     -If you are 5 or more minutes late for an appointment, we reserve the right to cancel and reschedule that appointment. Additionally, if you are habitually late or not showing (3 late cancellations and/or no shows), we reserve the right to cancel your remaining appointments and it will be your responsibility to obtain a new referral if services are still needed.

## 2024-11-22 NOTE — PROGRESS NOTES
Brief Wound Care Provider Note    Patient was previously seen by provider and debrided earlier this week. Patient was in ED yesterday due to pain from VAC and for VAC removal. Patient with rash and dermatitis secondary to VAC drape. She has not been able to tolerate VAC from pain and now dermatitis, will discontinue wound VAC. Patient instructed on performing daily wet to dry dressings by nurse. Will order wound care supplies. I recommend patient purchase Vashe for use.    Patient wound culture was positive for Candida and with periwound fungal dermatitis discussed with ID and they recommended 2 week course of Fluconazole. Will check with ID pharmacist regarding dosing due to patient weight, they recommend 400mg daily (4mg/kg).     Patient reports that she is picking up nicotine patches today, she has purchased tobacco flavored Vape that is nicotine free that she will start using to reduce cravings.    My total time spent caring for the patient on the day of the encounter was 10 minutes, reviewing history, assessment, counseling and education, and coordination of care.  This does not include time spent on separately billable procedures/tests.

## 2024-11-25 ENCOUNTER — APPOINTMENT (OUTPATIENT)
Dept: WOUND CARE | Facility: MEDICAL CENTER | Age: 59
End: 2024-11-25
Attending: EMERGENCY MEDICINE
Payer: MEDICAID

## 2024-11-26 ENCOUNTER — APPOINTMENT (OUTPATIENT)
Dept: VASCULAR SURGERY | Facility: MEDICAL CENTER | Age: 59
End: 2024-11-26
Payer: MEDICAID

## 2024-11-27 ENCOUNTER — APPOINTMENT (OUTPATIENT)
Dept: WOUND CARE | Facility: MEDICAL CENTER | Age: 59
End: 2024-11-27
Attending: EMERGENCY MEDICINE
Payer: MEDICAID

## 2024-11-29 ENCOUNTER — APPOINTMENT (OUTPATIENT)
Dept: WOUND CARE | Facility: MEDICAL CENTER | Age: 59
End: 2024-11-29
Attending: EMERGENCY MEDICINE
Payer: MEDICAID

## 2024-12-02 ENCOUNTER — APPOINTMENT (OUTPATIENT)
Dept: WOUND CARE | Facility: MEDICAL CENTER | Age: 59
End: 2024-12-02
Attending: EMERGENCY MEDICINE
Payer: MEDICAID

## 2024-12-03 ENCOUNTER — OFFICE VISIT (OUTPATIENT)
Dept: VASCULAR SURGERY | Facility: MEDICAL CENTER | Age: 59
End: 2024-12-03
Payer: MEDICAID

## 2024-12-03 VITALS
HEART RATE: 101 BPM | BODY MASS INDEX: 35.77 KG/M2 | HEIGHT: 65 IN | SYSTOLIC BLOOD PRESSURE: 124 MMHG | DIASTOLIC BLOOD PRESSURE: 88 MMHG | TEMPERATURE: 96.9 F

## 2024-12-03 DIAGNOSIS — Z98.890 S/P EXCISIONAL DEBRIDEMENT: ICD-10-CM

## 2024-12-03 PROCEDURE — 3079F DIAST BP 80-89 MM HG: CPT | Performed by: SURGERY

## 2024-12-03 PROCEDURE — 3074F SYST BP LT 130 MM HG: CPT | Performed by: SURGERY

## 2024-12-03 PROCEDURE — 99024 POSTOP FOLLOW-UP VISIT: CPT | Performed by: SURGERY

## 2024-12-03 NOTE — PROGRESS NOTES
"                 VASCULAR SURGERY                    Postop Note  _________________________________    12/3/2024    Ms. Nicole comes to the clinic today for follow-up, status post debridement and washout of left lower abdominal and left groin wounds and wound VAC placement on November 8, 2024.  She tells me that the wound VAC irritated her skin and she has switched to wet-to-dry dressing changes once a day.  She denies fever, chills, nausea, vomiting.  She also tells me that she is down to 5 cigarettes a day.      Vitals  /88 (BP Location: Left arm, Patient Position: Sitting, BP Cuff Size: Adult)   Pulse (!) 101   Temp 36.1 °C (96.9 °F) (Temporal)   Ht 1.651 m (5' 5\")   LMP 10/13/2016   BMI 35.77 kg/m²     Exam  General: Pleasant overweight female in nonapparent distress.  Abdomen: Wound is clean with no surrounding erythema or drainage,  about 5 x 3 x 1.5 cm in size  Left lower extremity: Wound is clean with good granulation tissue and no surrounding erythema or drainage, about 3 x 2 x 1 cm in size.  Foot is warm, well-perfused, no ulceration.      DiagnosisAssessment: Postop.      Plan: Doing well.  I instructed patient to do the wet-to-dry dressing changes twice a day.  I asked patient to follow-up in the clinic in 3 to 4 weeks for wound check or earlier if there is any problem.    I again counseled patient to stop smoking completely.        Julio César Beltran MD  [facility]  [phone]  [address], [place] [address]    "

## 2024-12-04 ENCOUNTER — APPOINTMENT (OUTPATIENT)
Dept: WOUND CARE | Facility: MEDICAL CENTER | Age: 59
End: 2024-12-04
Attending: EMERGENCY MEDICINE
Payer: MEDICAID

## 2024-12-06 ENCOUNTER — NON-PROVIDER VISIT (OUTPATIENT)
Dept: WOUND CARE | Facility: MEDICAL CENTER | Age: 59
End: 2024-12-06
Attending: EMERGENCY MEDICINE
Payer: MEDICAID

## 2024-12-06 PROCEDURE — 97597 DBRDMT OPN WND 1ST 20 CM/<: CPT

## 2024-12-06 NOTE — PROCEDURES
CSWD to left groin and left lower abdomen wounds using curette to remove ~15cm2 nonviable tissue from wound beds. 2% topical lidocaine applied prior to debridement with ~5 minute dwell time. Pt tolerated well.    CSWD to left plantar 4th toe wound using curette to remove ~0.2cm2 nonviable tissue from wound bed and ~1cm2 geen-wound callus and crusting. Pt tolerated well.

## 2024-12-06 NOTE — WOUND TEAM
Reduce visit frequency to every 2 weeks due to patient finances/transportation issues. Wound progressing well and she has all needed wound care supplies at home.

## 2024-12-06 NOTE — PATIENT INSTRUCTIONS
-Keep your wound dressing clean, dry, and intact.     -Change your dressings as directed and if it/they become soiled, soaked, or fall off.    -When you change your dressings at home: Wash your wound with normal saline, wound cleanser, or unscented soap and water prior to applying your new dressings. Please avoid cleansing with hydrogen peroxide or rubbing alcohol. Hydrogen peroxide and rubbing alcohol are toxic to new tissue and skin cells.    -Should you experience any significant changes in your wound(s), such as signs of infection (increasing redness, swelling, localized heat, increased pain, fever > 101 F, chills) or have any questions regarding your home care instructions, please contact the wound center at [phone]. If after hours, contact your primary care physician or go to the hospital emergency room.     If you are admitted to any hospital, you will need a new referral to come back to the wound clinic and any scheduled appointments that you already have may be cancelled.     -If you are 5 or more minutes late for an appointment, we reserve the right to cancel and reschedule that appointment. Additionally, if you are habitually late or not attending appts (3 late cancellations and/or no shows), we reserve the right to cancel your remaining appointments and it will be your responsibility to obtain a new referral if services are still needed.

## 2024-12-09 ENCOUNTER — APPOINTMENT (OUTPATIENT)
Dept: WOUND CARE | Facility: MEDICAL CENTER | Age: 59
End: 2024-12-09
Attending: EMERGENCY MEDICINE
Payer: MEDICAID

## 2024-12-11 ENCOUNTER — APPOINTMENT (OUTPATIENT)
Dept: WOUND CARE | Facility: MEDICAL CENTER | Age: 59
End: 2024-12-11
Attending: EMERGENCY MEDICINE
Payer: MEDICAID

## 2024-12-13 ENCOUNTER — APPOINTMENT (OUTPATIENT)
Dept: WOUND CARE | Facility: MEDICAL CENTER | Age: 59
End: 2024-12-13
Attending: EMERGENCY MEDICINE
Payer: MEDICAID

## 2024-12-16 ENCOUNTER — APPOINTMENT (OUTPATIENT)
Dept: WOUND CARE | Facility: MEDICAL CENTER | Age: 59
End: 2024-12-16
Attending: EMERGENCY MEDICINE
Payer: MEDICAID

## 2024-12-18 ENCOUNTER — APPOINTMENT (OUTPATIENT)
Dept: WOUND CARE | Facility: MEDICAL CENTER | Age: 59
End: 2024-12-18
Attending: EMERGENCY MEDICINE
Payer: MEDICAID

## 2024-12-20 ENCOUNTER — NON-PROVIDER VISIT (OUTPATIENT)
Dept: WOUND CARE | Facility: MEDICAL CENTER | Age: 59
End: 2024-12-20
Attending: EMERGENCY MEDICINE
Payer: MEDICAID

## 2024-12-20 PROCEDURE — 97597 DBRDMT OPN WND 1ST 20 CM/<: CPT

## 2024-12-20 PROCEDURE — 99211 OFF/OP EST MAY X REQ PHY/QHP: CPT

## 2024-12-20 NOTE — PATIENT INSTRUCTIONS
- Keep dressings clean and dry. Change dressings every day, and if they become over saturated, soiled or fall off.     - If you need to change your dressings at home: Wash your wound with normal saline, wound cleanser, or unscented soap and water prior to applying your new dressings. Please avoid cleansing with hydrogen peroxide or rubbing alcohol. Hydrogen peroxide and rubbing alcohol are toxic to new tissue and skin cells.    - Should you experience any significant changes in your wound(s), such as infection (redness, swelling, localized heat, increased pain, fever > 101 F, chills) or have any questions regarding your home care instructions, please contact the wound center at [phone]. If after hours, contact your primary care physician or go to the hospital emergency room.     - If you are admitted to any hospital, you will need a new referral to come back to the wound clinic and any scheduled appointments that you already have, may be cancelled.    - If you are 5 or more minutes late for an appointment, we reserve the right to cancel and reschedule that appointment. Additionally, if you are habitually late or not showing (3 late cancellations and/or no shows), we reserve the right to cancel your remaining appointments and it will be your responsibility to obtain a new referral if services are still needed.

## 2024-12-20 NOTE — PROGRESS NOTES
CSWD using curette to lower abd wound to remove nonviable tissue from wound bed. Patient stated that left groin wound was more painful today after taking a shower. Patient stated that she would not be able to tolerate CSWD to groin wound. Nonselective debridement using normal saline and dry gauze used to remove nonviable tissue from wound bed to left groin. Left groin wound did not appear infected however instructed patient that if wound gets more painful, any increased drainage, purulent drainage, redness or warmth to go to urgent care or ER. Patient stated she is aware and will continue to monitor wound. Patient has been changing her own dressings at home with no issues. All questions and concerns were addressed

## 2024-12-23 ENCOUNTER — APPOINTMENT (OUTPATIENT)
Dept: WOUND CARE | Facility: MEDICAL CENTER | Age: 59
End: 2024-12-23
Attending: EMERGENCY MEDICINE
Payer: MEDICAID

## 2024-12-27 ENCOUNTER — APPOINTMENT (OUTPATIENT)
Dept: WOUND CARE | Facility: MEDICAL CENTER | Age: 59
End: 2024-12-27
Attending: EMERGENCY MEDICINE
Payer: MEDICAID

## 2025-01-03 ENCOUNTER — NON-PROVIDER VISIT (OUTPATIENT)
Dept: WOUND CARE | Facility: MEDICAL CENTER | Age: 60
End: 2025-01-03
Attending: EMERGENCY MEDICINE
Payer: MEDICAID

## 2025-01-03 PROCEDURE — 97597 DBRDMT OPN WND 1ST 20 CM/<: CPT

## 2025-01-03 NOTE — PATIENT INSTRUCTIONS
- Keep dressings clean and dry. Change dressings every 3-4 days, and if they become over saturated, soiled or fall off.     - If you need to change your dressings at home: Wash your wound with normal saline, wound cleanser, or unscented soap and water prior to applying your new dressings. Please avoid cleansing with hydrogen peroxide or rubbing alcohol. Hydrogen peroxide and rubbing alcohol are toxic to new tissue and skin cells.    - Should you experience any significant changes in your wound(s), such as infection (redness, swelling, localized heat, increased pain, fever > 101 F, chills) or have any questions regarding your home care instructions, please contact the wound center at [phone]. If after hours, contact your primary care physician or go to the hospital emergency room.     - If you are admitted to any hospital, you will need a new referral to come back to the wound clinic and any scheduled appointments that you already have, may be cancelled.    - If you are 5 or more minutes late for an appointment, we reserve the right to cancel and reschedule that appointment. Additionally, if you are habitually late or not showing (3 late cancellations and/or no shows), we reserve the right to cancel your remaining appointments and it will be your responsibility to obtain a new referral if services are still needed.

## 2025-01-03 NOTE — PROGRESS NOTES
2% viscous lidocaine applied topically to wound beds for approximately 5 minutes prior to debridement. Conservative sharp wound debridement with curette to debride wound beds and periwound callus of non-viable tissue. Entire surface of wound, < 20 cm2 debrided. Refer to flow sheet for wound care details. Patient tolerated the procedure well.

## 2025-01-07 ENCOUNTER — APPOINTMENT (OUTPATIENT)
Dept: VASCULAR SURGERY | Facility: MEDICAL CENTER | Age: 60
End: 2025-01-07
Payer: MEDICAID

## 2025-01-09 ENCOUNTER — OFFICE VISIT (OUTPATIENT)
Dept: VASCULAR SURGERY | Facility: MEDICAL CENTER | Age: 60
End: 2025-01-09
Payer: MEDICAID

## 2025-01-09 VITALS
TEMPERATURE: 98.5 F | SYSTOLIC BLOOD PRESSURE: 162 MMHG | DIASTOLIC BLOOD PRESSURE: 84 MMHG | OXYGEN SATURATION: 98 % | HEIGHT: 65 IN | BODY MASS INDEX: 34.52 KG/M2 | HEART RATE: 96 BPM | WEIGHT: 207.2 LBS

## 2025-01-09 DIAGNOSIS — I77.9 PAOD (PERIPHERAL ARTERIAL OCCLUSIVE DISEASE) (HCC): ICD-10-CM

## 2025-01-09 PROCEDURE — 99024 POSTOP FOLLOW-UP VISIT: CPT | Performed by: PHYSICIAN ASSISTANT

## 2025-01-09 PROCEDURE — 3077F SYST BP >= 140 MM HG: CPT | Performed by: PHYSICIAN ASSISTANT

## 2025-01-09 PROCEDURE — 3079F DIAST BP 80-89 MM HG: CPT | Performed by: PHYSICIAN ASSISTANT

## 2025-01-09 ASSESSMENT — FIBROSIS 4 INDEX: FIB4 SCORE: 0.92

## 2025-01-09 NOTE — PROGRESS NOTES
"                 VASCULAR SURGERY                 Clinic Progress Note  _________________________________    Vitals for Today's Visit (1/9/2025)  BP (!) 162/84 (BP Location: Left arm, Patient Position: Sitting, BP Cuff Size: Adult)   Pulse 96   Temp 36.9 °C (98.5 °F) (Temporal)   Ht 1.651 m (5' 5\")   Wt 94 kg (207 lb 3.2 oz)   LMP 10/13/2016   SpO2 98%   BMI 34.48 kg/m²   _________________________________      1/9/2025  Ms. Nicole presents today for a wound check. She has been doing wet to dry dressings on the left groin wound. She has been compliant with attending the wound care clinic.  Unfortunately she is still smoking, about 1/2 PPD.       Exam:  Pleasant female, obese abdomen, no acute distress  Left groin/ lower abdominal wound about 3x1.5cm, superficial, granulation tissue present over wound.  The second wound she had has healed up.   Feet warm, well perfused.     A/P:  PAD  Left groin wound     She will continue to do wet to dry dressings over that area. She understands the importance of remaining compliant with attending wound clinic.  We discussed smoking cessation at length today. She is aware that smoking can inhibit wound healing and worsen PAD.  She will see us back in about 2-3 weeks for wound check, or earlier if there are any issues.     The patient was seen and examined with Dr. Beltran who agrees with the above plan.      Shell Quinones P.A.-C.  [facility]  [phone]  [address], [place] [address]  "

## 2025-01-17 ENCOUNTER — APPOINTMENT (OUTPATIENT)
Dept: WOUND CARE | Facility: MEDICAL CENTER | Age: 60
End: 2025-01-17
Attending: EMERGENCY MEDICINE
Payer: MEDICAID

## 2025-01-30 ENCOUNTER — APPOINTMENT (OUTPATIENT)
Dept: VASCULAR SURGERY | Facility: MEDICAL CENTER | Age: 60
End: 2025-01-30
Payer: MEDICAID

## 2025-01-31 ENCOUNTER — APPOINTMENT (OUTPATIENT)
Dept: WOUND CARE | Facility: MEDICAL CENTER | Age: 60
End: 2025-01-31
Attending: EMERGENCY MEDICINE
Payer: MEDICAID

## 2025-02-04 ENCOUNTER — APPOINTMENT (OUTPATIENT)
Dept: WOUND CARE | Facility: MEDICAL CENTER | Age: 60
End: 2025-02-04
Attending: EMERGENCY MEDICINE
Payer: MEDICAID

## 2025-02-05 ENCOUNTER — TELEPHONE (OUTPATIENT)
Dept: WOUND CARE | Facility: MEDICAL CENTER | Age: 60
End: 2025-02-05
Payer: MEDICAID

## 2025-02-05 NOTE — TELEPHONE ENCOUNTER
Called and spoke with patient this morning as she was a no show 2/4/25 and 2/5/25. Patient reports that she is having GI issues and is not sure she will even be able to make it tomorrow to her appointment. Advised her that if she no shows/late cancels tomorrow, that she will be discharged from clinic. Patient, not wanting to be discharged, was going to try and make it in to clinic tomorrow. Advised her that if she is not feeling well, she should not come to appointment. Transferred her up to the  to reschedule for next week as she said she should be feeling better then. Advised patient that if she does, in fact, no show/late cancel next week, she will be discharged form clinic. Patient verbalizes understanding.

## 2025-02-06 ENCOUNTER — APPOINTMENT (OUTPATIENT)
Dept: WOUND CARE | Facility: MEDICAL CENTER | Age: 60
End: 2025-02-06
Attending: EMERGENCY MEDICINE
Payer: MEDICAID

## 2025-02-06 ENCOUNTER — APPOINTMENT (OUTPATIENT)
Dept: VASCULAR SURGERY | Facility: MEDICAL CENTER | Age: 60
End: 2025-02-06
Payer: MEDICAID

## 2025-02-11 ENCOUNTER — NON-PROVIDER VISIT (OUTPATIENT)
Dept: WOUND CARE | Facility: MEDICAL CENTER | Age: 60
End: 2025-02-11
Attending: EMERGENCY MEDICINE
Payer: MEDICAID

## 2025-02-11 PROCEDURE — 97602 WOUND(S) CARE NON-SELECTIVE: CPT

## 2025-02-11 NOTE — PATIENT INSTRUCTIONS
- Keep dressings clean and dry. Change dressings every day, and if they become over saturated, soiled or fall off.     - If you need to change your dressings at home: Wash your wound with normal saline, wound cleanser, or unscented soap and water prior to applying your new dressings. Please avoid cleansing with hydrogen peroxide or rubbing alcohol. Hydrogen peroxide and rubbing alcohol are toxic to new tissue and skin cells.    - Should you experience any significant changes in your wound(s), such as infection (redness, swelling, localized heat, increased pain, fever > 101 F, chills) or have any questions regarding your home care instructions, please contact the wound center at [phone]. If after hours, contact your primary care physician or go to the hospital emergency room.     - If you are admitted to any hospital, you will need a new referral to come back to the wound clinic and any scheduled appointments that you already have, may be cancelled.    - If you are 5 or more minutes late for an appointment, we reserve the right to cancel and reschedule that appointment. Additionally, if you are habitually late or not showing (3 late cancellations and/or no shows), we reserve the right to cancel your remaining appointments and it will be your responsibility to obtain a new referral if services are still needed.    - Resolved wound be fragile for a few days, bathe and dry area gently, only ever regains a maximum of 80% of the tensile strength of the surrounding skin, remodeling of scar can continue for 6mo - a year. Contact PCP for a referral back her if any problems with area opening and draining again.

## 2025-02-11 NOTE — PROGRESS NOTES
Non selective debridement to left lower abdomen wound and 4th plantar toe wound using dry gauze and hypochlorous acid. Left groin wound has resolved. Educated patient on the importance of keeping the new skin nice and moist and to put lotion on it daily to prevent skin from drying and cracking. Also informed patient that she should continue using wet to dry dressings on left lower abdomen wound as she said that she has only been using dry gauze. Educated her that using dry gauze may dry out the wound bed which could inhibit wound healing. Patient stated that she understood and will resume using wet to dry dressings for her abdomen wound.     Patient request that she come to clinic every 4 weeks because travel is difficult at this time for her. Wound has been progressing well so will drop her down to q4 weeks. However if wound worsens, may need to see every 2 weeks again. Patient agreeable with this plan. All further questions and concerns were addressed.

## 2025-02-18 ENCOUNTER — OFFICE VISIT (OUTPATIENT)
Dept: VASCULAR SURGERY | Facility: MEDICAL CENTER | Age: 60
End: 2025-02-18
Payer: MEDICAID

## 2025-02-18 VITALS
WEIGHT: 225 LBS | TEMPERATURE: 97.6 F | BODY MASS INDEX: 37.49 KG/M2 | HEART RATE: 86 BPM | SYSTOLIC BLOOD PRESSURE: 132 MMHG | DIASTOLIC BLOOD PRESSURE: 82 MMHG | OXYGEN SATURATION: 98 % | HEIGHT: 65 IN

## 2025-02-18 DIAGNOSIS — I73.9 PERIPHERAL ARTERIAL DISEASE (HCC): ICD-10-CM

## 2025-02-18 DIAGNOSIS — Z72.0 TOBACCO USE: ICD-10-CM

## 2025-02-18 DIAGNOSIS — S31.104D NON-HEALING OPEN WOUND OF LEFT GROIN, SUBSEQUENT ENCOUNTER: ICD-10-CM

## 2025-02-18 PROCEDURE — 3079F DIAST BP 80-89 MM HG: CPT | Performed by: SURGERY

## 2025-02-18 PROCEDURE — 3075F SYST BP GE 130 - 139MM HG: CPT | Performed by: SURGERY

## 2025-02-18 PROCEDURE — 99213 OFFICE O/P EST LOW 20 MIN: CPT | Performed by: SURGERY

## 2025-02-18 ASSESSMENT — FIBROSIS 4 INDEX: FIB4 SCORE: 0.94

## 2025-02-18 NOTE — PROGRESS NOTES
"                 VASCULAR SURGERY                    Postop Note  _________________________________    2/18/2025    Ms. Nicole comes to the clinic today for left groin wound check.  She has no complaint.  She denies fever, chills, nausea, vomiting.  She has been doing wet-to-dry dressing changes twice a day.  She also goes to [facility] wound center.  She denies rest pain in the left foot.  Unfortunately, she continues to smoke.      Vitals  /82 (BP Location: Left arm, Patient Position: Sitting, BP Cuff Size: Adult)   Pulse 86   Temp 36.4 °C (97.6 °F) (Temporal)   Ht 1.651 m (5' 5\")   Wt 102 kg (225 lb)   LMP 10/13/2016   SpO2 98%   BMI 37.44 kg/m²     Exam  General: Pleasant overweight female in nonapparent distress.  HEENT: No carotid bruits.  Lungs: Clear to auscultation bilaterally.  Cardiovascular: Regular rate rhythm.   Lower extremities:  2 x 2 cm wound in the left groin, about 8 mm deep with good granulation tissue and no surrounding erythema, drainage, or fluctuation.  Multiphasic Doppler flow signals are obtained over the right pedal arteries.  Monophasic Doppler flow signals are obtained over the left dorsalis pedis artery.  There is some dependent rubor of the left toes.  No active ulceration.    DiagnosisAssessment:    1) Peripheral arterial disease.  2) Left groin wound.  3) Tobacco use.      Plan: Doing well.  I advised patient to continue to do wet-to-dry dressing changes to the left groin wound.  I reminded her not to walk barefooted at any time, even inside her house and not to dig into the soft tissue when she trims her toenails to avoid causing wound which may not heal secondary to circulation issue.  Patient indicated understanding.  I asked patient to follow-up in about 3 weeks for wound check.  Once the wound is healed, I will set her up for angiogram with possible intervention for her left leg.  Patient indicated understanding and agreed with plan.  I answered all of her questions. "  She knows to call me if she developed rest pain of the left foot at any time.    I again counseled patient to stop smoking.        Julio César Beltran MD  [facility]  [phone] [address], [place] [address]

## 2025-03-11 ENCOUNTER — NON-PROVIDER VISIT (OUTPATIENT)
Dept: WOUND CARE | Facility: MEDICAL CENTER | Age: 60
End: 2025-03-11
Attending: EMERGENCY MEDICINE
Payer: MEDICAID

## 2025-03-11 ENCOUNTER — OFFICE VISIT (OUTPATIENT)
Facility: MEDICAL CENTER | Age: 60
End: 2025-03-11
Payer: MEDICAID

## 2025-03-11 VITALS
HEART RATE: 78 BPM | HEIGHT: 66 IN | BODY MASS INDEX: 36.16 KG/M2 | OXYGEN SATURATION: 95 % | DIASTOLIC BLOOD PRESSURE: 78 MMHG | SYSTOLIC BLOOD PRESSURE: 126 MMHG | WEIGHT: 225 LBS | TEMPERATURE: 98.9 F

## 2025-03-11 DIAGNOSIS — Z72.0 TOBACCO USE: ICD-10-CM

## 2025-03-11 DIAGNOSIS — I10 PRIMARY HYPERTENSION: ICD-10-CM

## 2025-03-11 DIAGNOSIS — I74.5 ILIAC ARTERY OCCLUSION, LEFT (HCC): ICD-10-CM

## 2025-03-11 PROCEDURE — 3074F SYST BP LT 130 MM HG: CPT | Performed by: SURGERY

## 2025-03-11 PROCEDURE — 99214 OFFICE O/P EST MOD 30 MIN: CPT | Performed by: SURGERY

## 2025-03-11 PROCEDURE — 3078F DIAST BP <80 MM HG: CPT | Performed by: SURGERY

## 2025-03-11 PROCEDURE — 97602 WOUND(S) CARE NON-SELECTIVE: CPT

## 2025-03-11 ASSESSMENT — FIBROSIS 4 INDEX: FIB4 SCORE: 0.94

## 2025-03-11 NOTE — PROGRESS NOTES
"  VASCULAR SURGERY SERVICE  OFFICE FOLLOW UP      Date: 3/11/2025    Referring Provider:  ILDA Adame    Consulting Physician: Julio César Beltran MD - [facility]     -------------------------------------------------------------------------------------------------    Chief complaint:  \"Here for follow-up\".    HPI:  Ms. Nicole comes to the clinic today for wound check.  She has no new complaint.  Her left leg claudication remains unchanged.  She denies rest pain of the left foot.  Unfortunately, she continues to smoke.    Past Medical History:   Diagnosis Date    Arthritis     Back pain     Blood clotting disorder (HCC) 11/07/2024    left leg    Dental disorder     partial bottom and top denture    Disorder of thyroid     Falls     Hypertension        Past Surgical History:   Procedure Laterality Date    DEBRIDEMENT Left 11/8/2024    Procedure: LEFT GROIN WOUND DEBRIDEMENT, WASHOUT, AND WOUND VAC PLACEMENT;  Surgeon: Julio César Beltran M.D.;  Location: SURGERY SAME DAY [facility];  Service: General    APPLICATION OR REPLACEMENT, WOUND VAC Left 11/8/2024    Procedure: APPLICATION OR REPLACEMENT, WOUND VAC;  Surgeon: Julio César Beltran M.D.;  Location: SURGERY SAME [facility];  Service: General    ILIAC ANGIOPLASTY WITH STENT Left 5/17/2020    Procedure: ANGIOPLASTY, ARTERY, ILIAC, WITH STENT INSERTION;  Surgeon: Cuco Gimenez M.D.;  Location: SURGERY [facility];  Service: Vascular    OTHER ABDOMINAL SURGERY      gallbladder removed       Current Outpatient Medications   Medication Sig Dispense Refill    nicotine (NICODERM) 21 MG/24HR PATCH 24 HR Place 1 Patch on the skin every 24 hours. 30 Patch 0    cyclobenzaprine (FLEXERIL) 5 mg tablet MEDICATION INSTRUCTIONS FOR SURGERY/PROCEDURE SCHEDULED FOR 11/8/24     CONTINUE TAKING MED PRIOR TO SURGERY AS PRESCRIBED      HYDROcodone-acetaminophen (NORCO) 7.5-325 MG tab Take 1 Tablet by mouth 2 times a day as needed. MEDICATION INSTRUCTIONS FOR " SURGERY/PROCEDURE SCHEDULED FOR 11/8/24       CONTINUE TAKING MED PRIOR TO SURGERY AS PRESCRIBED      hydrOXYzine HCl (ATARAX) 25 MG Tab Take 25 mg by mouth 3 times a day as needed for Anxiety. MEDICATION INSTRUCTIONS FOR SURGERY/PROCEDURE SCHEDULED FOR 11/8/24     CONTINUE TAKING MED PRIOR TO SURGERY      cloNIDine (CATAPRES) 0.1 MG Tab Take 0.1 mg by mouth 3 times a day as needed. MEDICATION INSTRUCTIONS FOR SURGERY/PROCEDURE SCHEDULED FOR 11/8/24      CONTINUE TAKING MED PRIOR TO SURGERY      hydroCHLOROthiazide 25 MG Tab Take 25 mg by mouth every morning.   DO NOT TAKE DAY OF SURGERY      DULoxetine (CYMBALTA) 60 MG Cap DR Particles delayed-release capsule Take 60 mg by mouth every morning. MEDICATION INSTRUCTIONS FOR SURGERY/PROCEDURE SCHEDULED FOR 11/8/24       OK TO CONTINUE TAKING PRIOR TO SURGERY AND DAY OF SURGERY      lisinopril (PRINIVIL) 30 MG tablet Take 1 Tablet by mouth every day. (Patient taking differently: Take 30 mg by mouth every day. MEDICATION INSTRUCTIONS FOR SURGERY/PROCEDURE SCHEDULED FOR 11/8/24       DO NOT TAKE 24 HOURS PRIOR TO SURGERY) 30 Tablet 0    levothyroxine (SYNTHROID) 112 MCG Tab Take 112 mcg by mouth every day. MEDICATION INSTRUCTIONS FOR SURGERY/PROCEDURE SCHEDULED FOR 11/8/24     OK TO CONTINUE TAKING PRIOR TO SURGERY AND DAY OF SURGERY      pregabalin (LYRICA) 300 MG capsule Take 300 mg by mouth 2 times a day. MEDICATION INSTRUCTIONS FOR SURGERY/PROCEDURE SCHEDULED FOR 11/8/24       CONTINUE TAKING MED PRIOR TO SURGERY      clopidogrel (PLAVIX) 75 MG Tab Take 1 Tab by mouth every day. (Patient taking differently: Take 75 mg by mouth every day.   FOLLOW INSTRUCTIONS FROM SURGEON OR SPECIALIST) 90 Tab 11     No current facility-administered medications for this visit.       Social History     Socioeconomic History    Marital status:      Spouse name: Not on file    Number of children: Not on file    Years of education: Not on file    Highest education level: Not on  file   Occupational History    Not on file   Tobacco Use    Smoking status: Every Day     Current packs/day: 0.50     Average packs/day: 0.5 packs/day for 45.2 years (22.6 ttl pk-yrs)     Types: Cigarettes     Start date: 1980    Smokeless tobacco: Never   Vaping Use    Vaping status: Every Day    Substances: Nicotine   Substance and Sexual Activity    Alcohol use: Yes     Comment: occ    Drug use: No    Sexual activity: Not on file   Other Topics Concern    Not on file   Social History Narrative    Not on file     Social Drivers of Health     Financial Resource Strain: Low Risk  (5/21/2020)    Overall Financial Resource Strain (CARDIA)     Difficulty of Paying Living Expenses: Not very hard   Food Insecurity: Food Insecurity Present (9/12/2024)    Hunger Vital Sign     Worried About Running Out of Food in the Last Year: Sometimes true     Ran Out of Food in the Last Year: Sometimes true   Transportation Needs: Unmet Transportation Needs (9/12/2024)    PRAPARE - Transportation     Lack of Transportation (Medical): Yes     Lack of Transportation (Non-Medical): Yes   Physical Activity: Not on file   Stress: Not on file   Social Connections: Not on file   Intimate Partner Violence: Not At Risk (9/12/2024)    Humiliation, Afraid, Rape, and Kick questionnaire     Fear of Current or Ex-Partner: No     Emotionally Abused: No     Physically Abused: No     Sexually Abused: No   Housing Stability: Low Risk  (9/12/2024)    Housing Stability Vital Sign     Unable to Pay for Housing in the Last Year: No     Number of Times Moved in the Last Year: 0     Homeless in the Last Year: No       No family history on file.    Allergies:  Aspirin and Tape    Review of Systems:    Constitutional: Negative for fever, chills, weight loss,   HENT:    Negative for hearing loss or tinnitus    Eyes:    Negative for blurred vision, double vision, or loss of vision  Respiratory:  Negative for cough, hemoptysis, or wheezing    Cardiac:  Negative  "for chest pain or palpitations or orthopnea  Vascular:  Positive for left leg claudication, negative for rest pain   Gastrointestinal: Negative for nausea, vomiting, or abdominal pain     Negative for hematochezia or melena   Genitourinary: Negative for dysuria, frequency, or hematuria   Musculoskeletal: Negative for myalgias, back pain, or joint pain  Skin:   Negative for itching or rash  Neurological:  Negative for dizziness, headaches, or tremors     Negative for speech disturbance     Negative for extremity weakness or paresthesias  Endo/Heme:  Negative for easy bruising or bleeding  Psychiatric:  Negative for depression, suicidal ideas, or hallucinations    Physical Exam:  /78 (BP Location: Left arm, Patient Position: Sitting, BP Cuff Size: Adult)   Pulse 78   Temp 37.2 °C (98.9 °F) (Temporal)   Ht 1.676 m (5' 6\")   Wt 102 kg (225 lb)   SpO2 95%     Constitutional:          Pleasant overweight female in nonapparent distress   HEENT:                       Normocephalic and atraumatic, EOMI  Neck:                          Supple, no JVD, no bruits.  Cardiovascular:         Regular rate and rhythm  Pulmonary:                Good air entry bilaterally  Abdominal:                Soft, non-tender, non-distended                                      Aortic impulse not widened  Musculoskeletal:       No edema, no tenderness  Neurological:             CN II-XII grossly intact, no focal deficits  Skin:                           Skin is warm and dry. No rash noted.  Psychiatric:                Normal mood and affect.  Upper extremities:    Palpable bilateral radial pulses with multiphasic flow.  Lower extremities:    Palpable right common femoral pulses.  Left common femoral pulses are nonpalpable.  Multiphasic flow signals are obtained over the right pedal arteries.  Monophasic Doppler flow signals obtained over the left dorsalis pedis artery.  There is rubor of the left toes.  No ulceration in the leg or foot. "  There is a 1.5 x 1 cm wound in the left groin with no surrounding erythema, drainage, or fluctuation.      Labs:  Reviewed.     Radiology:  Reviewed.     Assessment: No ulceration of the toes  -Chronic left lower abdomen and groin wound.  -Left iliac occlusion with severe left leg claudication.  -Tobacco use.  -Hypertension.  -Dyslipidemia.  -Obesity.    Plan:  I had a long discussion with patient.  The wound in her left lower abdomen and groin appears to be healing well.  Patient is eager to have angiogram and intervention done for her left leg.  I discussed with her the option of undergoing angiogram with possible endovascular intervention if feasible and appropriate to improve circulation to the left leg.  The procedure should be done a few weeks from now to allow the left groin wound to heal completely.  Risks of the procedure were also discussed which include, but not limited to, bleeding, infection, contrast reaction, contrast-induced renal failure, and perioperative anesthetic complications.  Patient indicated understanding of the conversation and would like to proceed.  All questions were answered.  At her request, the procedure will be performed on April 2, 2025, pending operating room availability.  I also told patient that if the wound in the left groin flared up, the procedure would have to be canceled.  Patient indicated understanding and agreed.      Julio César Beltran MD  [facility]   [facility] preferred or call my office [phone]  __________________________________________________________________  Patient:Reba Deleon Corey   MRN:[id number]   CSN:[id number]

## 2025-03-11 NOTE — PATIENT INSTRUCTIONS
- Keep dressings clean and dry. Change dressings daily, and if they become over saturated, soiled or fall off.     - If you need to change your dressings at home: Wash your wound with normal saline, wound cleanser, or unscented soap and water prior to applying your new dressings. Please avoid cleansing with hydrogen peroxide or rubbing alcohol. Hydrogen peroxide and rubbing alcohol are toxic to new tissue and skin cells.    - Should you experience any significant changes in your wound(s), such as infection (redness, swelling, localized heat, increased pain, fever > 101 F, chills) or have any questions regarding your home care instructions, please contact the wound center at [phone]. If after hours, contact your primary care physician or go to the hospital emergency room.     - If you are admitted to any hospital, you will need a new referral to come back to the wound clinic and any scheduled appointments that you already have, may be cancelled.    - If you are 5 or more minutes late for an appointment, we reserve the right to cancel and reschedule that appointment. Additionally, if you are habitually late or not showing (3 late cancellations and/or no shows), we reserve the right to cancel your remaining appointments and it will be your responsibility to obtain a new referral if services are still needed.

## 2025-03-14 ENCOUNTER — APPOINTMENT (OUTPATIENT)
Dept: ADMISSIONS | Facility: MEDICAL CENTER | Age: 60
End: 2025-03-14
Attending: SURGERY
Payer: MEDICAID

## 2025-03-24 ENCOUNTER — PRE-ADMISSION TESTING (OUTPATIENT)
Dept: ADMISSIONS | Facility: MEDICAL CENTER | Age: 60
End: 2025-03-24
Attending: SURGERY
Payer: MEDICAID

## 2025-03-24 NOTE — OR NURSING
Pre-admit phone appt completed with patient. Med/fasting instructions given to patient. She will contact Dr. Beltran regarding her Plavix. She states she is unable to come in for labs prior to surgery due to her disability/uses walker and difficult to get around.

## 2025-04-02 ENCOUNTER — APPOINTMENT (OUTPATIENT)
Dept: RADIOLOGY | Facility: MEDICAL CENTER | Age: 60
End: 2025-04-02
Attending: SURGERY
Payer: MEDICAID

## 2025-04-02 ENCOUNTER — HOSPITAL ENCOUNTER (OUTPATIENT)
Facility: MEDICAL CENTER | Age: 60
End: 2025-04-02
Attending: SURGERY | Admitting: SURGERY
Payer: MEDICAID

## 2025-04-02 VITALS
SYSTOLIC BLOOD PRESSURE: 123 MMHG | HEART RATE: 93 BPM | DIASTOLIC BLOOD PRESSURE: 58 MMHG | OXYGEN SATURATION: 96 % | TEMPERATURE: 97.1 F | RESPIRATION RATE: 16 BRPM | BODY MASS INDEX: 35.41 KG/M2 | WEIGHT: 212.52 LBS | HEIGHT: 65 IN

## 2025-04-02 LAB
ERYTHROCYTE [DISTWIDTH] IN BLOOD BY AUTOMATED COUNT: 52.1 FL (ref 35.9–50)
HCT VFR BLD AUTO: 44.9 % (ref 37–47)
HGB BLD-MCNC: 15.5 G/DL (ref 12–16)
MCH RBC QN AUTO: 33.7 PG (ref 27–33)
MCHC RBC AUTO-ENTMCNC: 34.5 G/DL (ref 32.2–35.5)
MCV RBC AUTO: 97.6 FL (ref 81.4–97.8)
PLATELET # BLD AUTO: 309 K/UL (ref 164–446)
PMV BLD AUTO: 10.1 FL (ref 9–12.9)
RBC # BLD AUTO: 4.6 M/UL (ref 4.2–5.4)
WBC # BLD AUTO: 7.6 K/UL (ref 4.8–10.8)

## 2025-04-02 PROCEDURE — 160038 HCHG SURGERY MINUTES - EA ADDL 1 MIN LEVEL 2: Performed by: SURGERY

## 2025-04-02 PROCEDURE — 160048 HCHG OR STATISTICAL LEVEL 1-5: Performed by: SURGERY

## 2025-04-02 PROCEDURE — 85027 COMPLETE CBC AUTOMATED: CPT

## 2025-04-02 PROCEDURE — 99152 MOD SED SAME PHYS/QHP 5/>YRS: CPT | Performed by: SURGERY

## 2025-04-02 PROCEDURE — 160025 RECOVERY II MINUTES (STATS): Performed by: SURGERY

## 2025-04-02 PROCEDURE — C1760 CLOSURE DEV, VASC: HCPCS | Performed by: SURGERY

## 2025-04-02 PROCEDURE — 160035 HCHG PACU - 1ST 60 MINS PHASE I: Performed by: SURGERY

## 2025-04-02 PROCEDURE — 160027 HCHG SURGERY MINUTES - 1ST 30 MINS LEVEL 2: Performed by: SURGERY

## 2025-04-02 PROCEDURE — 75625 CONTRAST EXAM ABDOMINL AORTA: CPT | Mod: 26 | Performed by: SURGERY

## 2025-04-02 PROCEDURE — 37221 PR REVASCULARIZE ILIAC ARTERY,ANGIOPLASTY/ST*: CPT | Mod: LT | Performed by: SURGERY

## 2025-04-02 PROCEDURE — 700105 HCHG RX REV CODE 258: Mod: UD | Performed by: SURGERY

## 2025-04-02 PROCEDURE — 99153 MOD SED SAME PHYS/QHP EA: CPT | Performed by: SURGERY

## 2025-04-02 PROCEDURE — 502000 HCHG MISC OR IMPLANTS RC 0278: Performed by: SURGERY

## 2025-04-02 PROCEDURE — C1725 CATH, TRANSLUMIN NON-LASER: HCPCS | Performed by: SURGERY

## 2025-04-02 PROCEDURE — 160002 HCHG RECOVERY MINUTES (STAT): Performed by: SURGERY

## 2025-04-02 PROCEDURE — 700117 HCHG RX CONTRAST REV CODE 255: Mod: UD | Performed by: SURGERY

## 2025-04-02 PROCEDURE — C1713 ANCHOR/SCREW BN/BN,TIS/BN: HCPCS | Performed by: SURGERY

## 2025-04-02 PROCEDURE — 160046 HCHG PACU - 1ST 60 MINS PHASE II: Performed by: SURGERY

## 2025-04-02 PROCEDURE — 700102 HCHG RX REV CODE 250 W/ 637 OVERRIDE(OP): Mod: UD | Performed by: SURGERY

## 2025-04-02 PROCEDURE — 700111 HCHG RX REV CODE 636 W/ 250 OVERRIDE (IP): Mod: JZ,UD | Performed by: SURGERY

## 2025-04-02 PROCEDURE — A9270 NON-COVERED ITEM OR SERVICE: HCPCS | Mod: UD | Performed by: SURGERY

## 2025-04-02 PROCEDURE — C1894 INTRO/SHEATH, NON-LASER: HCPCS | Performed by: SURGERY

## 2025-04-02 PROCEDURE — 160047 HCHG PACU  - EA ADDL 30 MINS PHASE II: Performed by: SURGERY

## 2025-04-02 PROCEDURE — 36415 COLL VENOUS BLD VENIPUNCTURE: CPT

## 2025-04-02 PROCEDURE — 75716 ARTERY X-RAYS ARMS/LEGS: CPT | Mod: 26,59 | Performed by: SURGERY

## 2025-04-02 PROCEDURE — C1769 GUIDE WIRE: HCPCS | Performed by: SURGERY

## 2025-04-02 PROCEDURE — 160036 HCHG PACU - EA ADDL 30 MINS PHASE I: Performed by: SURGERY

## 2025-04-02 PROCEDURE — C1887 CATHETER, GUIDING: HCPCS | Performed by: SURGERY

## 2025-04-02 PROCEDURE — 160015 HCHG STAT PREOP MINUTES: Performed by: SURGERY

## 2025-04-02 DEVICE — IMPLANTABLE DEVICE: Type: IMPLANTABLE DEVICE | Site: GROIN | Status: FUNCTIONAL

## 2025-04-02 DEVICE — DEVICE CLOSER STARCLOSE SE - (10/BX): Type: IMPLANTABLE DEVICE | Site: GROIN | Status: FUNCTIONAL

## 2025-04-02 RX ORDER — DULOXETIN HYDROCHLORIDE 30 MG/1
30 CAPSULE, DELAYED RELEASE ORAL DAILY
COMMUNITY

## 2025-04-02 RX ORDER — MIDAZOLAM HYDROCHLORIDE 1 MG/ML
.5-2 INJECTION INTRAMUSCULAR; INTRAVENOUS PRN
Status: ACTIVE | OUTPATIENT
Start: 2025-04-02 | End: 2025-04-02

## 2025-04-02 RX ORDER — SODIUM CHLORIDE 9 MG/ML
500 INJECTION, SOLUTION INTRAVENOUS
Status: DISPENSED | OUTPATIENT
Start: 2025-04-02 | End: 2025-04-02

## 2025-04-02 RX ORDER — HEPARIN SODIUM,PORCINE 1000/ML
VIAL (ML) INJECTION
Status: DISCONTINUED | OUTPATIENT
Start: 2025-04-02 | End: 2025-04-02 | Stop reason: HOSPADM

## 2025-04-02 RX ORDER — IODIXANOL 270 MG/ML
INJECTION, SOLUTION INTRAVASCULAR
Status: DISCONTINUED | OUTPATIENT
Start: 2025-04-02 | End: 2025-04-02 | Stop reason: HOSPADM

## 2025-04-02 RX ORDER — SODIUM CHLORIDE 9 MG/ML
INJECTION, SOLUTION INTRAVENOUS CONTINUOUS
Status: CANCELLED | OUTPATIENT
Start: 2025-04-02

## 2025-04-02 RX ORDER — SODIUM CHLORIDE, SODIUM LACTATE, POTASSIUM CHLORIDE, CALCIUM CHLORIDE 600; 310; 30; 20 MG/100ML; MG/100ML; MG/100ML; MG/100ML
INJECTION, SOLUTION INTRAVENOUS CONTINUOUS
Status: DISCONTINUED | OUTPATIENT
Start: 2025-04-02 | End: 2025-04-02 | Stop reason: HOSPADM

## 2025-04-02 RX ORDER — LIDOCAINE HYDROCHLORIDE 10 MG/ML
INJECTION, SOLUTION INFILTRATION; PERINEURAL
Status: DISCONTINUED | OUTPATIENT
Start: 2025-04-02 | End: 2025-04-02 | Stop reason: HOSPADM

## 2025-04-02 RX ORDER — CLOPIDOGREL 300 MG/1
300 TABLET, FILM COATED ORAL ONCE
Status: COMPLETED | OUTPATIENT
Start: 2025-04-02 | End: 2025-04-02

## 2025-04-02 RX ORDER — ONDANSETRON 2 MG/ML
4 INJECTION INTRAMUSCULAR; INTRAVENOUS PRN
Status: ACTIVE | OUTPATIENT
Start: 2025-04-02 | End: 2025-04-02

## 2025-04-02 RX ADMIN — MIDAZOLAM HYDROCHLORIDE 1 MG: 1 INJECTION, SOLUTION INTRAMUSCULAR; INTRAVENOUS at 09:34

## 2025-04-02 RX ADMIN — FENTANYL CITRATE 50 MCG: 50 INJECTION, SOLUTION INTRAMUSCULAR; INTRAVENOUS at 10:10

## 2025-04-02 RX ADMIN — FENTANYL CITRATE 25 MCG: 50 INJECTION, SOLUTION INTRAMUSCULAR; INTRAVENOUS at 09:33

## 2025-04-02 RX ADMIN — MIDAZOLAM HYDROCHLORIDE 1 MG: 1 INJECTION, SOLUTION INTRAMUSCULAR; INTRAVENOUS at 09:23

## 2025-04-02 RX ADMIN — FENTANYL CITRATE 50 MCG: 50 INJECTION, SOLUTION INTRAMUSCULAR; INTRAVENOUS at 09:23

## 2025-04-02 RX ADMIN — FENTANYL CITRATE 25 MCG: 50 INJECTION, SOLUTION INTRAMUSCULAR; INTRAVENOUS at 10:23

## 2025-04-02 RX ADMIN — CEFAZOLIN 2 G: 1 INJECTION, POWDER, FOR SOLUTION INTRAMUSCULAR; INTRAVENOUS at 09:26

## 2025-04-02 RX ADMIN — HEPARIN SODIUM 5000 UNITS: 1000 INJECTION, SOLUTION INTRAVENOUS; SUBCUTANEOUS at 09:57

## 2025-04-02 RX ADMIN — CLOPIDOGREL BISULFATE 300 MG: 300 TABLET, FILM COATED ORAL at 11:03

## 2025-04-02 RX ADMIN — FENTANYL CITRATE 25 MCG: 50 INJECTION, SOLUTION INTRAMUSCULAR; INTRAVENOUS at 09:43

## 2025-04-02 ASSESSMENT — PAIN DESCRIPTION - PAIN TYPE
TYPE: CHRONIC PAIN

## 2025-04-02 ASSESSMENT — FIBROSIS 4 INDEX: FIB4 SCORE: 0.94

## 2025-04-02 NOTE — PROGRESS NOTES
Medication history reviewed with PT at bedside    [facility] is complete per PT reporting    Allergies reviewed.     Patient denies any outpatient antibiotics in the last 30 days.     Patient is not taking anticoagulants.    Dispense history is available.    Preferred pharmacy for this visit - [facility] [place] [facility] [place] ([phone])

## 2025-04-02 NOTE — OR NURSING
1135 report to Rudy RN   Aortogram/ plasty/ stent to iliac artery. Bilateral pressure dressing CDI/soft. Left and toes improved after time/ rest/ warm blanket.  Off bedrest 1230. C/o back pain. Warm pack and repositioned .VSS.

## 2025-04-02 NOTE — OR SURGEON
Immediate Post OP Note    PreOp Diagnosis: Peripheral arterial disease with severe left leg claudication and rest pain.      PostOp Diagnosis: Same.      Procedure(s):  Abdominal aortogram.  Bilateral lower extremity angiogram.  Left iliac angioplasty and stenting.  Application of StarClose, bilateral common femoral arteries  - Wound Class: Clean    Surgeon(s):  Julio César Beltran M.D.    Type of Anesthesia: IV sedation for 1 hour and 15 minutes and local anesthesia with 20 mL 1% lidocaine solution.  No anesthesia staff entered./Sedation    Surgical Staff:  Circulator: Nelsy Almendarez R.N.  Relief Circulator: Tessie Alvarado R.N.  Scrub Person: Raquel Alcantara  Sedation/Monitoring Nurse: Trino Evans R.N.  Radiology Technologist: Felicia Gunderson    Specimens removed if any:  * No specimens in log *    Estimated Blood Loss: 20 mL.    Contrast used: 56 mL Visipaque.    Findings: Chronically occluded left iliac arterial system, successfully treated with angioplasty and stenting.    Complications: None.    Dictated, #[id number].        4/2/2025 10:51 AM Julio César Beltran M.D.

## 2025-04-02 NOTE — OP REPORT
DATE OF SERVICE:  04/02/2025     SURGEON:  Julio César Beltran MD     ANESTHESIA:  Intravenous sedation with fentanyl and Versed for 1 hour and 15   minutes and local anesthesia with 20 mL of 1% lidocaine solution.     PREOPERATIVE DIAGNOSIS:  Left iliac occlusion with severe left leg   claudication and rest pain.     POSTOPERATIVE DIAGNOSIS:  Left iliac occlusion with severe left leg   claudication and rest pain.     PROCEDURES:  1.  Percutaneous cannulation of bilateral common femoral arteries under   ultrasound guidance.  2.  Catheter, abdominal aorta.  3.  Abdominal aortogram.  4.  Bilateral lower extremity angiogram.  5.  Percutaneous, transluminal angioplasty of chronically occluded left iliac   arterial system using 5x80 mm angioplasty balloon.  6.  Percutaneous, transluminal stenting of chronically occluded left iliac   arterial system using 7x140 mm Zilver stent, which was postdilated using 6x140   mm angioplasty balloon.  7.  Application of StarClose, bilateral common femoral arteries.     INDICATIONS FOR PROCEDURE:  This is a pleasant 60-year-old female with   multiple medical problems who has severe left leg claudication and left foot   rest pain secondary to iliac occlusion.  Discussion was made with the patient.    She would like to undergo angiogram with possible endovascular intervention,   fully understanding all risks.     DESCRIPTION OF PROCEDURE:  Informed consent was obtained.  The patient was   taken to the endovascular suite and was placed in the supine position.  She   was given Ancef intravenously.  A timeout procedure was done.  Intravenous   sedation was performed with fentanyl and Versed.  The patient was closely   monitored.     Next, both of her groins were sterilely prepped and draped in the normal   fashion.  Duplex evaluation of the right common femoral artery was performed.    It was found to be patent.  Under ultrasound guidance, the right common   femoral artery was percutaneously  cannulated using an access needle, after the   skin and subcutaneous tissues were anesthetized with 1% Marcaine solution.  A   guidewire was passed through the needle.  The needle was removed and replaced   with a 5-Citizen of [place] sheath.  Over the guidewire, an Omniflush catheter was   advanced into the abdominal aorta and positioned at L2 vertebral level.  An   abdominal aortogram was performed.     The catheter was retracted down to the aortic bifurcation.  Bilateral lower   extremity angiogram was obtained.     Next, duplex evaluation of the left common femoral artery was performed.  It   was found to be patent.  Under ultrasound guidance, the left common femoral   artery was percutaneously cannulated using an access needle, after the skin   and subcutaneous tissues were anesthetized with lidocaine solution.  A guidewire   was passed through the needle.  The needle was removed and replaced with a   6-Citizen [place] sheath.  The chronically occluded left iliac arterial system was   successfully crossed.  The patient was given heparin 5000 units intravenously.    After the heparin was allowed to circulate systemically for 3 minutes, over   the guidewire, percutaneous, transluminal angioplasty of the chronically   occluded left iliac arterial system was performed using a 5x80 mm angioplasty   balloon in multiple segments.  Followup angiogram was obtained and showed   recoiled stenosis; therefore, stenting of the left iliac arterial system was   performed from its origin down to the external iliac artery using 7x140 mm   Zilver self-expanding stent.  The stent was postdilated using 6x140 mm   angioplasty balloon.  Followup angiogram was obtained and showed completion of   luminal patency with preservation of distal flow.     The 5-Citizen [place] sheath on the right side was removed and StarClose was deployed   with excellent hemostasis achieved.  The 6-[place]izen [place] sheath on the left side was   also removed and StarClose was deployed with  excellent hemostasis achieved.    Sterile dressings were applied.     IMPRESSION:  1.  Patent infrarenal abdominal aorta.  2.  The right iliac arterial system is patent with diffuse nonstenotic plaque   formation.  3.  The left iliac system was chronically occluded.  Following percutaneous,   transluminal angioplasty and stenting using 7x140 mm Zilver stent, there was   complete restoration of luminal patency.  4.  Patent right common femoral and profunda femoral arteries as well as proximal to mid  superficial femoral artery.  The distal circulation of the right leg was not   evaluated in order to minimize the amount of contrast use.  5.  Patent left common femoral, profunda femoral, and proximal to mid superficial femoral   arteries.  These arteries are small compared to the right side secondary to the   left iliac vein chronically occluded.  The distal circulation of the left leg   was not evaluated in order to minimize the amount of contrast use.     ESTIMATED BLOOD LOSS:  20 mL.     CONTRAST USED:  56 mL Visipaque.     COUNTS:  Sponge and instrument count was correct x2.     Patient was then awakened and taken to recovery area in stable condition.        ______________________________  MD DEWAYNE Yates/ARSH    DD:  04/02/2025 10:59  DT:  04/02/2025 14:18    Job#:  [id number]

## 2025-04-02 NOTE — OR NURSING
1240 - bedrest completed per MD order. Pt dressed and ambulating in hallway with Rudy VILLANUEVA. Back to room, bilateral groin sites CDI, soft.    1250 - discharge instructions reviewed with pt and pt's sister by Rudy VILLANUEVA.     1254 - pt verbalizes readiness for discharge. Pt taken to car via wheelchair by CNA. No further needs.

## 2025-04-02 NOTE — DISCHARGE INSTRUCTIONS
HOME CARE INSTRUCTIONS    ACTIVITY: Rest and take it easy for the first 24 hours.  A responsible adult is recommended to remain with you during that time.  It is normal to feel sleepy.  We encourage you to not do anything that requires balance, judgment or coordination.    FOR 24 HOURS DO NOT:  Drive, operate machinery or run household appliances.  Drink beer or alcoholic beverages.  Make important decisions or sign legal documents.    SPECIAL INSTRUCTIONS: ·     ·   Minimal activity at home for 2 days.   ·   No Pushing, pulling, or heavy lifting (10 LBS) for 2 weeks.   ·   Keep dressings on for 2 days, then remove.   ·   Keep incisions dry and clean.   ·   Okay to shower after 2 days.   ·   No bath for 10 days.   .   Resume home medications including Plavix.   .   Follow-up with Dr. Beltran in 2 to 3 weeks.  Call [phone] for appointment and for any problem.     DIET: To avoid nausea, slowly advance diet as tolerated, avoiding spicy or greasy foods for the first day.  Add more substantial food to your diet according to your physician's instructions.  Babies can be fed formula or breast milk as soon as they are hungry.  INCREASE FLUIDS AND FIBER TO AVOID CONSTIPATION.    MEDICATIONS: Resume taking daily medication.  Take prescribed pain medication with food.  If no medication is prescribed, you may take non-aspirin pain medication if needed.  PAIN MEDICATION CAN BE VERY CONSTIPATING.  Take a stool softener or laxative such as senokot, pericolace, or milk of magnesia if needed.    A follow-up appointment should be arranged with your doctor; call to schedule.    You should CALL YOUR PHYSICIAN if you develop:  Fever greater than 101 degrees F.  Pain not relieved by medication, or persistent nausea or vomiting.  Excessive bleeding (blood soaking through dressing) or unexpected drainage from the wound.  Extreme redness or swelling around the incision site, drainage of pus or foul smelling drainage.  Inability to urinate  or empty your bladder within 8 hours.  Problems with breathing or chest pain.    You should call 911 if you develop problems with breathing or chest pain.  If you are unable to contact your doctor or surgical center, you should go to the nearest emergency room or urgent care center.  Physician's telephone #: [phone]    MILD FLU-LIKE SYMPTOMS ARE NORMAL.  YOU MAY EXPERIENCE GENERALIZED MUSCLE ACHES, THROAT IRRITATION, HEADACHE AND/OR SOME NAUSEA.    If any questions arise, call your doctor.  If your doctor is not available, please feel free to call the Surgical Center at [phone].  The Center is open Monday through Friday from 7AM to 7PM.      A registered nurse may call you a few days after your surgery to see how you are doing after your procedure.    You may also receive a survey in the mail within the next two weeks and we ask that you take a few moments to complete the survey and return it to us.  Our goal is to provide you with very good care and we value your comments.     Depression / Suicide Risk    As you are discharged from this [facility], it is important to learn how to keep safe from harming yourself.    Recognize the warning signs:  Abrupt changes in personality, positive or negative- including increase in energy   Giving away possessions  Change in eating patterns- significant weight changes-  positive or negative  Change in sleeping patterns- unable to sleep or sleeping all the time   Unwillingness or inability to communicate  Depression  Unusual sadness, discouragement and loneliness  Talk of wanting to die  Neglect of personal appearance   Rebelliousness- reckless behavior  Withdrawal from people/activities they love  Confusion- inability to concentrate     If you or a loved one observes any of these behaviors or has concerns about self-harm, here's what you can do:  Talk about it- your feelings and reasons for harming yourself  Remove any means that you might use to hurt  yourself (examples: pills, rope, extension cords, firearm)  Get professional help from the community (Mental Health, Substance Abuse, psychological counseling)  Do not be alone:Call your Safe Contact- someone whom you trust who will be there for you.  Call your local CRISIS HOTLINE 616-4219 or 139-795-6635  Call your local Children's Mobile Crisis Response Team [place] (872) 343-5785 or www.[employer]  Call the toll free National Suicide Prevention Hotlines   National Suicide Prevention Lifeline 145-869-EQQR (1826)  [facility] Line Network 800-SUICIDE (503-2743)    I acknowledge receipt and understanding of these Home Care instructions.

## 2025-04-02 NOTE — OR NURSING
1032 arrive to pacu. Bilateral groins CDI/soft. Reminded pt to keep legs straight. Pt repositioned for comfort. C/o back pain. Left fore foot  cold. Big toe to pale. Other toes are dusky. Warm blanket placed to leg    1053 sister updated via EPIC text.  1103 plavix given.  1108 recheck left foot. Improved. Continue w/ warm blanket. Warm pack to lower back.

## 2025-04-07 ENCOUNTER — APPOINTMENT (OUTPATIENT)
Dept: WOUND CARE | Facility: MEDICAL CENTER | Age: 60
End: 2025-04-07
Attending: EMERGENCY MEDICINE
Payer: MEDICAID

## 2025-04-17 ENCOUNTER — APPOINTMENT (OUTPATIENT)
Facility: MEDICAL CENTER | Age: 60
End: 2025-04-17
Payer: MEDICAID

## (undated) DEVICE — SET INTRO MIRCROPUNCTURE - MPIS-501-SST

## (undated) DEVICE — DECANTER FLD BLS - (50/CA)

## (undated) DEVICE — STAPLER SKIN DISP - (6/BX 10BX/CA) VISISTAT

## (undated) DEVICE — MASK OXYGEN VNYL ADLT MED CONC WITH 7 FOOT TUBING - (50EA/CA)

## (undated) DEVICE — BLADE SURGICAL #11 - (50/BX)

## (undated) DEVICE — SUCTION INSTRUMENT YANKAUER BULBOUS TIP W/O VENT (50EA/CA)

## (undated) DEVICE — SET LEADWIRE 5 LEAD BEDSIDE DISPOSABLE ECG (1SET OF 5/EA)

## (undated) DEVICE — SUTURE 6-0 PROLENE BV-1 D/A 30 (36PK/BX)"

## (undated) DEVICE — SENSOR OXIMETER ADULT SPO2 RD SET (20EA/BX)

## (undated) DEVICE — SODIUM CHL IRRIGATION 0.9% 1000ML (12EA/CA)

## (undated) DEVICE — SUTURE 2-0 VICRYL PLUS CT-1 - 8 X 18 INCH(12/BX)

## (undated) DEVICE — SOD. CHL. INJ. 0.9% 1000 ML - (14EA/CA 60CA/PF)

## (undated) DEVICE — TRANSDUCER ADULT DISP. SINGLE BONDED STERILE - (20EA/CA)

## (undated) DEVICE — CLIP MED INTNL HRZN TI ESCP - (25/BX)

## (undated) DEVICE — CANNULA W/ SUPPLY TUBING O2 - (50/CA)

## (undated) DEVICE — TUBING CLEARLINK DUO-VENT - C-FLO (48EA/CA)

## (undated) DEVICE — PENCIL ELECTSURG 10FT BTN SWH - (50/CA)

## (undated) DEVICE — GLIDEWIRE ANGLED .035 X 180 (5EA/BX)

## (undated) DEVICE — SET FLUID WARMING STANDARD FLOW - (10/CA)

## (undated) DEVICE — SHEATH RO 5F 10CM (10EA/BX)

## (undated) DEVICE — STAPLER 35MM SKIN WIDE ROTATING HEAD (6EA/BX)

## (undated) DEVICE — RETRACTOR O C SECTION LRY - (5/BX)

## (undated) DEVICE — PACK ANGIOGRAPHY DRAPE - (2/CA)

## (undated) DEVICE — MULTI TORQUE DEVICE DISP (5EA/BX)

## (undated) DEVICE — SUTURE 2-0 VICRYL PLUS CT-1 36 (36PK/BX)"

## (undated) DEVICE — SUTURE CV

## (undated) DEVICE — BLADE SURGICAL CLIPPER - (50EA/CA)

## (undated) DEVICE — GLOVE BIOGEL SZ 7 SURGICAL PF LTX - (50PR/BX 4BX/CA)

## (undated) DEVICE — SUTURE 3-0 SILK SH C/R 18 IN - (12/BX)

## (undated) DEVICE — WATER IRRIGATION STERILE 1000ML (12EA/CA)

## (undated) DEVICE — LACTATED RINGERS INJ 1000 ML - (14EA/CA 60CA/PF)

## (undated) DEVICE — SHEET CARDIOVASCULAR - (4/CA)

## (undated) DEVICE — CANISTER SUCTION RIGID RED 1500CC (40EA/CA)

## (undated) DEVICE — KIT SURGIFLO W/OUT THROMBIN - (6EA/CA)

## (undated) DEVICE — DRAPE IOBAN II 23 IN X 33 IN - (10/BX)

## (undated) DEVICE — NEEDLE THINWALL 19GA X 7CM

## (undated) DEVICE — PACK MINOR BASIN - (2EA/CA)

## (undated) DEVICE — TUBE NG SALEM SUMP 16FR (50EA/CA)

## (undated) DEVICE — CATHETER RUBICON 35 65CM

## (undated) DEVICE — SUTURE 3-0 VICRYL PLUS SH - 8X 18 INCH (12/BX)

## (undated) DEVICE — MARKER SIZING OMNIFLUSH 5F 70 - 5/BX .035 20CM SEGMENT

## (undated) DEVICE — MASK ANESTHESIA ADULT  - (100/CA)

## (undated) DEVICE — GOWN WARMING STANDARD FLEX - (30/CA)

## (undated) DEVICE — DRESSING NON-ADHERING 8 X 3 - (50/BX)

## (undated) DEVICE — SPONGE GAUZESTER 4 X 4 4PLY - (128PK/CA)

## (undated) DEVICE — CANNULA O2 COMFORT SOFT EAR ADULT 7 FT TUBING (50/CA)

## (undated) DEVICE — SPONGE KITTNER DISSECTORS - (5EA/PK 50PK/CA)

## (undated) DEVICE — GOWN SURGEONS X-LARGE - DISP. (30/CA)

## (undated) DEVICE — Device

## (undated) DEVICE — SUTURE 2-0 PROLENE SH D/A (36PK/BX)

## (undated) DEVICE — SUTURE GENERAL

## (undated) DEVICE — ELECTRODE 850 FOAM ADHESIVE - HYDROGEL RADIOTRNSPRNT (50/PK)

## (undated) DEVICE — PROTECTOR ULNA NERVE - (36PR/CA)

## (undated) DEVICE — PAD PREP 24 X 48 CUFFED - (100/CA)

## (undated) DEVICE — VESSELOOP MINI BLUE STERILE - SURG-I-LOOP (10EA/BX)

## (undated) DEVICE — SENSOR SPO2 NEO LNCS ADHESIVE (20/BX) SEE USER NOTES

## (undated) DEVICE — SET BIFURCATED BLOOD - (48EA/CS)

## (undated) DEVICE — GLOVE SZ 6 BIOGEL PI MICRO - PF LF (50PR/BX 4BX/CA)

## (undated) DEVICE — GLOVE BIOGEL INDICATOR SZ 7.5 SURGICAL PF LTX - (50PR/BX 4BX/CA)

## (undated) DEVICE — DRAPE MAYO STAND - (30/CA)

## (undated) DEVICE — GLOVE BIOGEL SZ 8 SURGICAL PF LTX - (50PR/BX 4BX/CA)

## (undated) DEVICE — DRAPE LARGE 3 QUARTER - (20/CA)

## (undated) DEVICE — CANISTER SUCTION 3000ML MECHANICAL FILTER AUTO SHUTOFF MEDI-VAC NONSTERILE LF DISP (40EA/CA)

## (undated) DEVICE — SET EXTENSION WITH 2 PORTS (48EA/CA) ***PART #2C8610 IS A SUBSTITUTE*****

## (undated) DEVICE — SUTURE 3-0 PROLENE SH 36 (36PK/BX)"

## (undated) DEVICE — DISH PETRI STERILE (50EA/CA)

## (undated) DEVICE — SYRINGE 20 ML LL (50EA/BX 4BX/CA)

## (undated) DEVICE — FILTER BLOOD TRANSFUSION - (40/CA) (PALL)

## (undated) DEVICE — SUTURE 1 VICRYL PLUS CTX - 36 INCH (36/BX)

## (undated) DEVICE — PACK AV FISTULA (2EA/CA)

## (undated) DEVICE — GUIDEWIRE STARTER J CURVED FIXED CORE .035 260CM 10 3MM PTFE/HEPARIN COATED (5EA/BX)

## (undated) DEVICE — GELAQUASONIC 100 ULTRASOUND - 48/BX 20GM STERILE FOIL POUCH

## (undated) DEVICE — CELLSAVER PACK

## (undated) DEVICE — DEVICE INFLATION DIGITAL BLUE DIAMOND (5EA/BX)

## (undated) DEVICE — SYRINGE 30 ML LL (56/BX)

## (undated) DEVICE — TOWEL STOP TIMEOUT SAFETY FLAG (40EA/CA)

## (undated) DEVICE — CLIP SM INTNL HRZN TI ESCP LGT - (24EA/PK 25PK/BX)

## (undated) DEVICE — TRAY SRGPRP PVP IOD WT PRP - (20/CA)

## (undated) DEVICE — HEAD HOLDER JUNIOR/ADULT

## (undated) DEVICE — ELECTRODE DUAL RETURN W/ CORD - (50/PK)

## (undated) DEVICE — SYRINGE DISP. 12 CC LL - (100/BX)

## (undated) DEVICE — TOWELS CLOTH SURGICAL - (4/PK 20PK/CA)

## (undated) DEVICE — POLY UMBILICAL TAPE 1/8X30 - (36/BX)

## (undated) DEVICE — PAD LAP STERILE 18 X 18 - (5/PK 40PK/CA)

## (undated) DEVICE — GLOVE BIOGEL PI INDICATOR SZ 6.5 SURGICAL PF LF - (50/BX 4BX/CA)

## (undated) DEVICE — SODIUM CHL. INJ. 0.9% 500ML (24EA/CA 50CA/PF)

## (undated) DEVICE — KIT INTROPERCUTANEOUS SHEATH - 8.5 FR W/MAX BARRIER AND BIOPATCH  (5/CA)

## (undated) DEVICE — COVER LIGHT HANDLE ALC PLUS DISP (18EA/BX)

## (undated) DEVICE — TRAY, CV CATH MULTI-LUM.AK-25703

## (undated) DEVICE — KIT RADIAL ARTERY 20GA W/MAX BARRIER AND BIOPATCH (5EA/CA) #10740 IS FOR THE SET RADIAL ARTERIAL

## (undated) DEVICE — KIT RADIAL ARTERY 20GA W/MAX BARRIER AND BIOPATCH  (5EA/CA) #10740 IS FOR THE SET RADIAL ARTERIAL

## (undated) DEVICE — KIT ANESTHESIA W/CIRCUIT & 3/LT BAG W/FILTER (20EA/CA)

## (undated) DEVICE — DRAPE IOBAN II INCISE 23X17 - (10EA/BX 4BX/CA)

## (undated) DEVICE — CHLORAPREP 26 ML APPLICATOR - ORANGE TINT(25/CA)

## (undated) DEVICE — INSTRUMENT JAWCOVER SUTURE - BOOTS (5PK/BX)

## (undated) DEVICE — SYRINGE 10 ML CONTROL LL (25EA/BX 4BX/CA)

## (undated) DEVICE — SYRINGE DISP. 60 CC LL - (30/BX, 12BX/CA)**WHEN THESE ARE GONE ORDER #500206**

## (undated) DEVICE — BOVIE BLADE COATED - (50/PK)

## (undated) DEVICE — DRAPE SURGICAL U 77X120 - (10/CA)

## (undated) DEVICE — PTFE PLED STER - (250/CA)

## (undated) DEVICE — DRESSING TRANSPARENT FILM TEGADERM 2.375 X 2.75"  (100EA/BX)"

## (undated) DEVICE — PACK MAJOR BASIN - (2EA/CA)

## (undated) DEVICE — SUTURE 2-0 SILK SH (36PK/BX)

## (undated) DEVICE — DISC, CD-R PLAT 700MB MEMOREX

## (undated) DEVICE — DERMABOND ADVANCED - (12EA/BX)

## (undated) DEVICE — GUIDEWIRES STARTER (PTFE COATED) ROSEN 0.035 260CM 4 1.5 MM J

## (undated) DEVICE — SLEEVE, VASO, THIGH, MED

## (undated) DEVICE — SYRINGE SAFETY 10 ML 18 GA X 1 1/2 BLUNT LL (100/BX 4BX/CA)

## (undated) DEVICE — CELLSAVER STAT

## (undated) DEVICE — BAG ISOLATION 20X20 INVISI - SHEILD (10EA/BX)

## (undated) DEVICE — SUTURE 4-0 PROLENE SH 36 (36PK/BX)"

## (undated) DEVICE — SUTURE 3-0 VICRYL PLUS - 12 X 18 INCH (12/BX)

## (undated) DEVICE — GUIDEWIRES STARTER (PTFE COATED) ROSEN 0.035 180CM 4 1.5 MM J

## (undated) DEVICE — VESSELOOP MAXI BLUE STERILE- SURG-I-LOOP (10EA/BX)

## (undated) DEVICE — SPONGE RADIOPAQUE CTN X-LG - STERILE (50PK/CA) MADE TO ORDER ITEM AND HAS A 4-6 WEEK LEAD TIME

## (undated) DEVICE — CANISTER SUCTION 3000ML MECHANICAL FILTER AUTO SHUTOFF MEDI-VAC NONSTERILE LF DISP  (40EA/CA)

## (undated) DEVICE — GOWN SURGEONS LARGE - (32/CA)

## (undated) DEVICE — SUTURE 3-0 SILK 12 X 18 IN - (36/BX)

## (undated) DEVICE — SUTURE 5-0 PROLENE C-1 D/A 24 (36PK/BX)"

## (undated) DEVICE — BOVIE  BLADE 6 EXTENDED - (50/PK)

## (undated) DEVICE — SUTURE 0 SILK TIES (36PK/BX)

## (undated) DEVICE — CLIP LG INTNL HRZN TI ESCP LGT - (20/BX)

## (undated) DEVICE — SYRINGE NON SAFETY 5 CC 20 GA X 1-1/2 IN (100/BX 4BX/CA)

## (undated) DEVICE — SUTURE 4-0 PROLENE RB-1 D/A 36 (36PK/BX)"

## (undated) DEVICE — SUTURE 3-0 30 CM X 30 CM STRATAFIX SPRIAL PS-1 NEEDLE (12/BX)

## (undated) DEVICE — STOPCOCK 3-WAY W/SWIVEL LEVER LOCK (50EA/CA)

## (undated) DEVICE — GOWN SURGICAL X-LARGE ULTRA - FILM-REINFORCED (20/CA)

## (undated) DEVICE — BLANKET WARMING UPPER BODY - (10/CA)

## (undated) DEVICE — SUTURE 2-0 SILK SH 24 (36PK/BX)"

## (undated) DEVICE — NEPTUNE 4 PORT MANIFOLD - (20/PK)

## (undated) DEVICE — TUBE E-T HI-LO CUFF 7.5MM (10EA/PK)

## (undated) DEVICE — BAG RESUSCITATION DISPOSABLE - WITH MASK (10 EA/CA)

## (undated) DEVICE — TUBE CONNECTING SUCTION - CLEAR PLASTIC STERILE 72 IN (50EA/CA)

## (undated) DEVICE — GUIDEWIRE AMPLATZ STIFF .035 145CM 7 STRAIGHT (5/BX)

## (undated) DEVICE — SYRINGE NON SAFETY 10 CC 21 GA X 1-1/2 IN (100/BX 4BX/CA)

## (undated) DEVICE — GUIDEWIRES STARTER (PTFE COATED) J CURVED FIXED CORE 0.035 180CM 10 3 MM J FS

## (undated) DEVICE — SUTURE 4-0 MONOCRYL PLUS PS-1 - 27 INCH (36/BX)

## (undated) DEVICE — CATHETER EMBOLECTOMY 5FR

## (undated) DEVICE — SLEEVE VASO DVT COMPRESSION CALF MED - (10PR/CA)

## (undated) DEVICE — KIT  I.V. START (100EA/CA)

## (undated) DEVICE — CLIP MED LG INTNL HRZN TI ESCP - (20/BX)

## (undated) DEVICE — TRAY SURESTEP FOLEY TEMP SENSING 16FR (10EA/CA) ORDER  #18764 FOR TEMP FOLEY ONLY

## (undated) DEVICE — SUTURE 2-0 SILK 12 X 18" (36PK/BX)"